# Patient Record
Sex: FEMALE | Race: WHITE | NOT HISPANIC OR LATINO | Employment: OTHER | URBAN - NONMETROPOLITAN AREA
[De-identification: names, ages, dates, MRNs, and addresses within clinical notes are randomized per-mention and may not be internally consistent; named-entity substitution may affect disease eponyms.]

---

## 2020-01-01 ENCOUNTER — HOSPITAL ENCOUNTER (INPATIENT)
Facility: HOSPITAL | Age: 80
LOS: 14 days | Discharge: HOME HEALTH | DRG: 871 | End: 2021-01-13
Attending: INTERNAL MEDICINE | Admitting: INTERNAL MEDICINE
Payer: MEDICARE

## 2020-01-01 VITALS
TEMPERATURE: 96 F | BODY MASS INDEX: 31.36 KG/M2 | DIASTOLIC BLOOD PRESSURE: 90 MMHG | WEIGHT: 195.13 LBS | OXYGEN SATURATION: 98 % | SYSTOLIC BLOOD PRESSURE: 132 MMHG | HEART RATE: 77 BPM | RESPIRATION RATE: 18 BRPM | HEIGHT: 66 IN

## 2020-01-01 DIAGNOSIS — E11.9 TYPE 2 DIABETES MELLITUS WITHOUT COMPLICATION, WITHOUT LONG-TERM CURRENT USE OF INSULIN: ICD-10-CM

## 2020-01-01 DIAGNOSIS — A41.9 SEPTIC SHOCK: Primary | ICD-10-CM

## 2020-01-01 DIAGNOSIS — R53.81 DEBILITY: ICD-10-CM

## 2020-01-01 DIAGNOSIS — R65.21 SEPTIC SHOCK: Primary | ICD-10-CM

## 2020-01-01 LAB
ALBUMIN SERPL BCP-MCNC: 2.8 G/DL (ref 3.5–5.2)
ALBUMIN SERPL BCP-MCNC: 2.9 G/DL (ref 3.5–5.2)
ALBUMIN SERPL BCP-MCNC: 3 G/DL (ref 3.5–5.2)
ALP SERPL-CCNC: 76 U/L (ref 55–135)
ALP SERPL-CCNC: 77 U/L (ref 55–135)
ALP SERPL-CCNC: 86 U/L (ref 55–135)
ALT SERPL W/O P-5'-P-CCNC: 12 U/L (ref 10–44)
ALT SERPL W/O P-5'-P-CCNC: 14 U/L (ref 10–44)
ALT SERPL W/O P-5'-P-CCNC: 15 U/L (ref 10–44)
ANION GAP SERPL CALC-SCNC: 5 MMOL/L (ref 8–16)
ANION GAP SERPL CALC-SCNC: 5 MMOL/L (ref 8–16)
ANION GAP SERPL CALC-SCNC: 7 MMOL/L (ref 8–16)
AST SERPL-CCNC: 30 U/L (ref 10–40)
AST SERPL-CCNC: 31 U/L (ref 10–40)
AST SERPL-CCNC: 36 U/L (ref 10–40)
BASOPHILS # BLD AUTO: 0.09 K/UL (ref 0–0.2)
BASOPHILS # BLD AUTO: 0.11 K/UL (ref 0–0.2)
BASOPHILS # BLD AUTO: 0.11 K/UL (ref 0–0.2)
BASOPHILS NFR BLD: 1.1 % (ref 0–1.9)
BASOPHILS NFR BLD: 1.1 % (ref 0–1.9)
BASOPHILS NFR BLD: 1.3 % (ref 0–1.9)
BILIRUB SERPL-MCNC: 0.2 MG/DL (ref 0.1–1)
BUN SERPL-MCNC: 15 MG/DL (ref 8–23)
BUN SERPL-MCNC: 18 MG/DL (ref 8–23)
BUN SERPL-MCNC: 18 MG/DL (ref 8–23)
CALCIUM SERPL-MCNC: 8.4 MG/DL (ref 8.7–10.5)
CALCIUM SERPL-MCNC: 8.4 MG/DL (ref 8.7–10.5)
CALCIUM SERPL-MCNC: 8.9 MG/DL (ref 8.7–10.5)
CHLORIDE SERPL-SCNC: 103 MMOL/L (ref 95–110)
CHLORIDE SERPL-SCNC: 104 MMOL/L (ref 95–110)
CHLORIDE SERPL-SCNC: 104 MMOL/L (ref 95–110)
CO2 SERPL-SCNC: 28 MMOL/L (ref 23–29)
CO2 SERPL-SCNC: 28 MMOL/L (ref 23–29)
CO2 SERPL-SCNC: 29 MMOL/L (ref 23–29)
CREAT SERPL-MCNC: 2.1 MG/DL (ref 0.5–1.4)
CREAT SERPL-MCNC: 2.2 MG/DL (ref 0.5–1.4)
CREAT SERPL-MCNC: 2.4 MG/DL (ref 0.5–1.4)
DIFFERENTIAL METHOD: ABNORMAL
EOSINOPHIL # BLD AUTO: 0.3 K/UL (ref 0–0.5)
EOSINOPHIL # BLD AUTO: 0.4 K/UL (ref 0–0.5)
EOSINOPHIL # BLD AUTO: 0.4 K/UL (ref 0–0.5)
EOSINOPHIL NFR BLD: 3.1 % (ref 0–8)
EOSINOPHIL NFR BLD: 4.6 % (ref 0–8)
EOSINOPHIL NFR BLD: 5.3 % (ref 0–8)
ERYTHROCYTE [DISTWIDTH] IN BLOOD BY AUTOMATED COUNT: 17 % (ref 11.5–14.5)
ERYTHROCYTE [DISTWIDTH] IN BLOOD BY AUTOMATED COUNT: 17.1 % (ref 11.5–14.5)
ERYTHROCYTE [DISTWIDTH] IN BLOOD BY AUTOMATED COUNT: 17.4 % (ref 11.5–14.5)
EST. GFR  (AFRICAN AMERICAN): 21.6 ML/MIN/1.73 M^2
EST. GFR  (AFRICAN AMERICAN): 24 ML/MIN/1.73 M^2
EST. GFR  (AFRICAN AMERICAN): 25.4 ML/MIN/1.73 M^2
EST. GFR  (NON AFRICAN AMERICAN): 18.8 ML/MIN/1.73 M^2
EST. GFR  (NON AFRICAN AMERICAN): 20.9 ML/MIN/1.73 M^2
EST. GFR  (NON AFRICAN AMERICAN): 22.1 ML/MIN/1.73 M^2
GLUCOSE SERPL-MCNC: 108 MG/DL (ref 70–110)
GLUCOSE SERPL-MCNC: 91 MG/DL (ref 70–110)
GLUCOSE SERPL-MCNC: 98 MG/DL (ref 70–110)
HCT VFR BLD AUTO: 35.2 % (ref 37–48.5)
HCT VFR BLD AUTO: 36.5 % (ref 37–48.5)
HCT VFR BLD AUTO: 37.6 % (ref 37–48.5)
HGB BLD-MCNC: 10.8 G/DL (ref 12–16)
HGB BLD-MCNC: 11.2 G/DL (ref 12–16)
HGB BLD-MCNC: 11.6 G/DL (ref 12–16)
IMM GRANULOCYTES # BLD AUTO: 0.06 K/UL (ref 0–0.04)
IMM GRANULOCYTES # BLD AUTO: 0.07 K/UL (ref 0–0.04)
IMM GRANULOCYTES # BLD AUTO: 0.07 K/UL (ref 0–0.04)
IMM GRANULOCYTES NFR BLD AUTO: 0.6 % (ref 0–0.5)
IMM GRANULOCYTES NFR BLD AUTO: 0.8 % (ref 0–0.5)
IMM GRANULOCYTES NFR BLD AUTO: 0.9 % (ref 0–0.5)
LYMPHOCYTES # BLD AUTO: 1.8 K/UL (ref 1–4.8)
LYMPHOCYTES # BLD AUTO: 2 K/UL (ref 1–4.8)
LYMPHOCYTES # BLD AUTO: 2.1 K/UL (ref 1–4.8)
LYMPHOCYTES NFR BLD: 18 % (ref 18–48)
LYMPHOCYTES NFR BLD: 24.8 % (ref 18–48)
LYMPHOCYTES NFR BLD: 25.7 % (ref 18–48)
MCH RBC QN AUTO: 28.2 PG (ref 27–31)
MCH RBC QN AUTO: 28.4 PG (ref 27–31)
MCH RBC QN AUTO: 28.5 PG (ref 27–31)
MCHC RBC AUTO-ENTMCNC: 30.7 G/DL (ref 32–36)
MCHC RBC AUTO-ENTMCNC: 30.7 G/DL (ref 32–36)
MCHC RBC AUTO-ENTMCNC: 30.9 G/DL (ref 32–36)
MCV RBC AUTO: 92 FL (ref 82–98)
MCV RBC AUTO: 92 FL (ref 82–98)
MCV RBC AUTO: 93 FL (ref 82–98)
MONOCYTES # BLD AUTO: 0.6 K/UL (ref 0.3–1)
MONOCYTES # BLD AUTO: 0.8 K/UL (ref 0.3–1)
MONOCYTES # BLD AUTO: 0.8 K/UL (ref 0.3–1)
MONOCYTES NFR BLD: 10.5 % (ref 4–15)
MONOCYTES NFR BLD: 6.2 % (ref 4–15)
MONOCYTES NFR BLD: 9.1 % (ref 4–15)
NEUTROPHILS # BLD AUTO: 4.5 K/UL (ref 1.8–7.7)
NEUTROPHILS # BLD AUTO: 5 K/UL (ref 1.8–7.7)
NEUTROPHILS # BLD AUTO: 7.2 K/UL (ref 1.8–7.7)
NEUTROPHILS NFR BLD: 56.5 % (ref 38–73)
NEUTROPHILS NFR BLD: 59.4 % (ref 38–73)
NEUTROPHILS NFR BLD: 71 % (ref 38–73)
NRBC BLD-RTO: 0 /100 WBC
PLATELET # BLD AUTO: 334 K/UL (ref 150–350)
PLATELET # BLD AUTO: 359 K/UL (ref 150–350)
PLATELET # BLD AUTO: 427 K/UL (ref 150–350)
PMV BLD AUTO: 9.4 FL (ref 9.2–12.9)
PMV BLD AUTO: 9.6 FL (ref 9.2–12.9)
PMV BLD AUTO: 9.9 FL (ref 9.2–12.9)
POCT GLUCOSE: 100 MG/DL (ref 70–110)
POCT GLUCOSE: 101 MG/DL (ref 70–110)
POCT GLUCOSE: 102 MG/DL (ref 70–110)
POCT GLUCOSE: 104 MG/DL (ref 70–110)
POCT GLUCOSE: 104 MG/DL (ref 70–110)
POCT GLUCOSE: 111 MG/DL (ref 70–110)
POCT GLUCOSE: 122 MG/DL (ref 70–110)
POCT GLUCOSE: 75 MG/DL (ref 70–110)
POCT GLUCOSE: 86 MG/DL (ref 70–110)
POCT GLUCOSE: 88 MG/DL (ref 70–110)
POCT GLUCOSE: 93 MG/DL (ref 70–110)
POTASSIUM SERPL-SCNC: 3.8 MMOL/L (ref 3.5–5.1)
POTASSIUM SERPL-SCNC: 4.1 MMOL/L (ref 3.5–5.1)
POTASSIUM SERPL-SCNC: 4.3 MMOL/L (ref 3.5–5.1)
PROT SERPL-MCNC: 5.8 G/DL (ref 6–8.4)
PROT SERPL-MCNC: 6 G/DL (ref 6–8.4)
PROT SERPL-MCNC: 6.2 G/DL (ref 6–8.4)
RBC # BLD AUTO: 3.79 M/UL (ref 4–5.4)
RBC # BLD AUTO: 3.97 M/UL (ref 4–5.4)
RBC # BLD AUTO: 4.08 M/UL (ref 4–5.4)
SODIUM SERPL-SCNC: 136 MMOL/L (ref 136–145)
SODIUM SERPL-SCNC: 138 MMOL/L (ref 136–145)
SODIUM SERPL-SCNC: 139 MMOL/L (ref 136–145)
WBC # BLD AUTO: 10.08 K/UL (ref 3.9–12.7)
WBC # BLD AUTO: 7.94 K/UL (ref 3.9–12.7)
WBC # BLD AUTO: 8.35 K/UL (ref 3.9–12.7)

## 2020-01-01 PROCEDURE — 97530 THERAPEUTIC ACTIVITIES: CPT

## 2020-01-01 PROCEDURE — 63600175 PHARM REV CODE 636 W HCPCS: Performed by: INTERNAL MEDICINE

## 2020-01-01 PROCEDURE — 25000003 PHARM REV CODE 250: Performed by: INTERNAL MEDICINE

## 2020-01-01 PROCEDURE — 85025 COMPLETE CBC W/AUTO DIFF WBC: CPT

## 2020-01-01 PROCEDURE — 99900031 HC PATIENT EDUCATION (STAT)

## 2020-01-01 PROCEDURE — 97116 GAIT TRAINING THERAPY: CPT

## 2020-01-01 PROCEDURE — 99900035 HC TECH TIME PER 15 MIN (STAT)

## 2020-01-01 PROCEDURE — 11000001 HC ACUTE MED/SURG PRIVATE ROOM

## 2020-01-01 PROCEDURE — 80053 COMPREHEN METABOLIC PANEL: CPT

## 2020-01-01 PROCEDURE — 97110 THERAPEUTIC EXERCISES: CPT

## 2020-01-01 PROCEDURE — S0030 INJECTION, METRONIDAZOLE: HCPCS | Performed by: INTERNAL MEDICINE

## 2020-01-01 PROCEDURE — 94760 N-INVAS EAR/PLS OXIMETRY 1: CPT

## 2020-01-01 PROCEDURE — S0030 INJECTION, METRONIDAZOLE: HCPCS | Performed by: EMERGENCY MEDICINE

## 2020-01-01 PROCEDURE — 94761 N-INVAS EAR/PLS OXIMETRY MLT: CPT

## 2020-01-01 PROCEDURE — 36415 COLL VENOUS BLD VENIPUNCTURE: CPT

## 2020-01-01 PROCEDURE — 63600175 PHARM REV CODE 636 W HCPCS: Performed by: EMERGENCY MEDICINE

## 2020-01-01 PROCEDURE — C9113 INJ PANTOPRAZOLE SODIUM, VIA: HCPCS | Performed by: INTERNAL MEDICINE

## 2020-01-01 PROCEDURE — 25000003 PHARM REV CODE 250: Performed by: EMERGENCY MEDICINE

## 2020-01-01 PROCEDURE — 97162 PT EVAL MOD COMPLEX 30 MIN: CPT

## 2020-01-01 PROCEDURE — 92523 SPEECH SOUND LANG COMPREHEN: CPT

## 2020-01-01 PROCEDURE — 97535 SELF CARE MNGMENT TRAINING: CPT

## 2020-01-01 PROCEDURE — 97166 OT EVAL MOD COMPLEX 45 MIN: CPT

## 2020-01-01 PROCEDURE — 92507 TX SP LANG VOICE COMM INDIV: CPT

## 2020-01-01 RX ORDER — TALC
6 POWDER (GRAM) TOPICAL NIGHTLY PRN
Status: DISCONTINUED | OUTPATIENT
Start: 2020-01-01 | End: 2021-01-01 | Stop reason: HOSPADM

## 2020-01-01 RX ORDER — ONDANSETRON 2 MG/ML
4 INJECTION INTRAMUSCULAR; INTRAVENOUS EVERY 8 HOURS PRN
Status: DISCONTINUED | OUTPATIENT
Start: 2020-01-01 | End: 2021-01-01

## 2020-01-01 RX ORDER — SUCRALFATE 1 G/1
1 TABLET ORAL 4 TIMES DAILY
Status: CANCELLED | OUTPATIENT
Start: 2020-01-01

## 2020-01-01 RX ORDER — METRONIDAZOLE 500 MG/100ML
500 INJECTION, SOLUTION INTRAVENOUS
Status: CANCELLED | OUTPATIENT
Start: 2020-01-01

## 2020-01-01 RX ORDER — INSULIN ASPART 100 [IU]/ML
0-5 INJECTION, SOLUTION INTRAVENOUS; SUBCUTANEOUS EVERY 6 HOURS PRN
Status: DISCONTINUED | OUTPATIENT
Start: 2020-01-01 | End: 2021-01-01

## 2020-01-01 RX ORDER — GLUCAGON 1 MG
1 KIT INJECTION
Status: DISCONTINUED | OUTPATIENT
Start: 2020-01-01 | End: 2021-01-01 | Stop reason: HOSPADM

## 2020-01-01 RX ORDER — PANTOPRAZOLE SODIUM 40 MG/10ML
40 INJECTION, POWDER, LYOPHILIZED, FOR SOLUTION INTRAVENOUS 2 TIMES DAILY
Status: DISCONTINUED | OUTPATIENT
Start: 2020-01-01 | End: 2021-01-01

## 2020-01-01 RX ORDER — GLUCAGON 1 MG
1 KIT INJECTION
Status: CANCELLED | OUTPATIENT
Start: 2020-01-01

## 2020-01-01 RX ORDER — INSULIN ASPART 100 [IU]/ML
0-5 INJECTION, SOLUTION INTRAVENOUS; SUBCUTANEOUS EVERY 6 HOURS PRN
Status: CANCELLED | OUTPATIENT
Start: 2020-01-01

## 2020-01-01 RX ORDER — SUCRALFATE 1 G/1
1 TABLET ORAL 4 TIMES DAILY
Status: DISCONTINUED | OUTPATIENT
Start: 2020-01-01 | End: 2021-01-01 | Stop reason: HOSPADM

## 2020-01-01 RX ORDER — MICONAZOLE NITRATE 2 %
POWDER (GRAM) TOPICAL 2 TIMES DAILY
Status: DISCONTINUED | OUTPATIENT
Start: 2020-01-01 | End: 2021-01-01 | Stop reason: HOSPADM

## 2020-01-01 RX ORDER — TALC
6 POWDER (GRAM) TOPICAL NIGHTLY PRN
Status: CANCELLED | OUTPATIENT
Start: 2020-01-01

## 2020-01-01 RX ORDER — GABAPENTIN 100 MG/1
100 CAPSULE ORAL 2 TIMES DAILY
Status: CANCELLED | OUTPATIENT
Start: 2020-01-01

## 2020-01-01 RX ORDER — HYDROCODONE BITARTRATE AND ACETAMINOPHEN 500; 5 MG/1; MG/1
TABLET ORAL
Status: CANCELLED | OUTPATIENT
Start: 2020-01-01

## 2020-01-01 RX ORDER — HYDROCODONE BITARTRATE AND ACETAMINOPHEN 500; 5 MG/1; MG/1
TABLET ORAL
Status: DISCONTINUED | OUTPATIENT
Start: 2020-01-01 | End: 2021-01-01

## 2020-01-01 RX ORDER — SODIUM CHLORIDE 0.9 % (FLUSH) 0.9 %
10 SYRINGE (ML) INJECTION
Status: CANCELLED | OUTPATIENT
Start: 2020-01-01

## 2020-01-01 RX ORDER — ONDANSETRON 2 MG/ML
4 INJECTION INTRAMUSCULAR; INTRAVENOUS EVERY 8 HOURS PRN
Status: CANCELLED | OUTPATIENT
Start: 2020-01-01

## 2020-01-01 RX ORDER — OXYCODONE AND ACETAMINOPHEN 10; 325 MG/1; MG/1
1 TABLET ORAL EVERY 4 HOURS PRN
Status: DISCONTINUED | OUTPATIENT
Start: 2020-01-01 | End: 2021-01-01

## 2020-01-01 RX ORDER — MONTELUKAST SODIUM 10 MG/1
10 TABLET ORAL NIGHTLY
Status: CANCELLED | OUTPATIENT
Start: 2020-01-01

## 2020-01-01 RX ORDER — METRONIDAZOLE 500 MG/100ML
500 INJECTION, SOLUTION INTRAVENOUS
Status: DISCONTINUED | OUTPATIENT
Start: 2020-01-01 | End: 2021-01-01

## 2020-01-01 RX ORDER — CARVEDILOL 3.12 MG/1
3.12 TABLET ORAL 2 TIMES DAILY
Status: DISCONTINUED | OUTPATIENT
Start: 2020-01-01 | End: 2021-01-01 | Stop reason: HOSPADM

## 2020-01-01 RX ORDER — OXYCODONE AND ACETAMINOPHEN 10; 325 MG/1; MG/1
1 TABLET ORAL EVERY 4 HOURS PRN
Status: CANCELLED | OUTPATIENT
Start: 2020-01-01

## 2020-01-01 RX ORDER — CARVEDILOL 3.12 MG/1
3.12 TABLET ORAL 2 TIMES DAILY
Status: CANCELLED | OUTPATIENT
Start: 2020-01-01

## 2020-01-01 RX ORDER — PANTOPRAZOLE SODIUM 40 MG/10ML
40 INJECTION, POWDER, LYOPHILIZED, FOR SOLUTION INTRAVENOUS 2 TIMES DAILY
Status: CANCELLED | OUTPATIENT
Start: 2020-01-01

## 2020-01-01 RX ORDER — MONTELUKAST SODIUM 10 MG/1
10 TABLET ORAL NIGHTLY
Status: DISCONTINUED | OUTPATIENT
Start: 2020-01-01 | End: 2021-01-01 | Stop reason: HOSPADM

## 2020-01-01 RX ORDER — SODIUM CHLORIDE 0.9 % (FLUSH) 0.9 %
10 SYRINGE (ML) INJECTION
Status: DISCONTINUED | OUTPATIENT
Start: 2020-01-01 | End: 2021-01-01

## 2020-01-01 RX ORDER — GABAPENTIN 100 MG/1
100 CAPSULE ORAL 2 TIMES DAILY
Status: DISCONTINUED | OUTPATIENT
Start: 2020-01-01 | End: 2021-01-01 | Stop reason: HOSPADM

## 2020-01-01 RX ADMIN — CARVEDILOL 3.12 MG: 3.12 TABLET, FILM COATED ORAL at 09:12

## 2020-01-01 RX ADMIN — VANCOMYCIN HYDROCHLORIDE 125 MG: KIT at 12:12

## 2020-01-01 RX ADMIN — SUCRALFATE 1 G: 1 TABLET ORAL at 12:12

## 2020-01-01 RX ADMIN — METRONIDAZOLE 500 MG: 500 INJECTION, SOLUTION INTRAVENOUS at 04:12

## 2020-01-01 RX ADMIN — OXYCODONE AND ACETAMINOPHEN 1 TABLET: 325; 10 TABLET ORAL at 02:12

## 2020-01-01 RX ADMIN — CARVEDILOL 3.12 MG: 3.12 TABLET, FILM COATED ORAL at 08:12

## 2020-01-01 RX ADMIN — PANTOPRAZOLE SODIUM 40 MG: 40 INJECTION, POWDER, FOR SOLUTION INTRAVENOUS at 08:12

## 2020-01-01 RX ADMIN — ANTI-FUNGAL POWDER MICONAZOLE NITRATE TALC FREE: 1.42 POWDER TOPICAL at 09:12

## 2020-01-01 RX ADMIN — METRONIDAZOLE 500 MG: 500 INJECTION, SOLUTION INTRAVENOUS at 12:12

## 2020-01-01 RX ADMIN — CARVEDILOL 3.12 MG: 3.12 TABLET, FILM COATED ORAL at 10:12

## 2020-01-01 RX ADMIN — OXYCODONE AND ACETAMINOPHEN 1 TABLET: 325; 10 TABLET ORAL at 01:12

## 2020-01-01 RX ADMIN — GABAPENTIN 100 MG: 100 CAPSULE ORAL at 08:12

## 2020-01-01 RX ADMIN — OXYCODONE AND ACETAMINOPHEN 1 TABLET: 325; 10 TABLET ORAL at 10:12

## 2020-01-01 RX ADMIN — ONDANSETRON 4 MG: 2 INJECTION INTRAMUSCULAR; INTRAVENOUS at 02:12

## 2020-01-01 RX ADMIN — OXYCODONE AND ACETAMINOPHEN 1 TABLET: 325; 10 TABLET ORAL at 04:12

## 2020-01-01 RX ADMIN — SUCRALFATE 1 G: 1 TABLET ORAL at 08:12

## 2020-01-01 RX ADMIN — METRONIDAZOLE 500 MG: 500 INJECTION, SOLUTION INTRAVENOUS at 05:12

## 2020-01-01 RX ADMIN — OXYCODONE AND ACETAMINOPHEN 1 TABLET: 325; 10 TABLET ORAL at 12:12

## 2020-01-01 RX ADMIN — PANTOPRAZOLE SODIUM 40 MG: 40 INJECTION, POWDER, FOR SOLUTION INTRAVENOUS at 09:12

## 2020-01-01 RX ADMIN — METRONIDAZOLE 500 MG: 500 INJECTION, SOLUTION INTRAVENOUS at 06:12

## 2020-01-01 RX ADMIN — VANCOMYCIN HYDROCHLORIDE 125 MG: KIT at 06:12

## 2020-01-01 RX ADMIN — GABAPENTIN 100 MG: 100 CAPSULE ORAL at 10:12

## 2020-01-01 RX ADMIN — MONTELUKAST 10 MG: 10 TABLET, FILM COATED ORAL at 08:12

## 2020-01-01 RX ADMIN — MONTELUKAST 10 MG: 10 TABLET, FILM COATED ORAL at 09:12

## 2020-01-01 RX ADMIN — METRONIDAZOLE 500 MG: 500 INJECTION, SOLUTION INTRAVENOUS at 02:12

## 2020-01-01 RX ADMIN — POTASSIUM CHLORIDE AND SODIUM CHLORIDE: 900; 150 INJECTION, SOLUTION INTRAVENOUS at 12:12

## 2020-01-01 RX ADMIN — METRONIDAZOLE 500 MG: 500 INJECTION, SOLUTION INTRAVENOUS at 07:12

## 2020-01-01 RX ADMIN — SUCRALFATE 1 G: 1 TABLET ORAL at 04:12

## 2020-01-01 RX ADMIN — SUCRALFATE 1 G: 1 TABLET ORAL at 05:12

## 2020-01-01 RX ADMIN — VANCOMYCIN HYDROCHLORIDE 125 MG: KIT at 11:12

## 2020-01-01 RX ADMIN — PANTOPRAZOLE SODIUM 40 MG: 40 INJECTION, POWDER, FOR SOLUTION INTRAVENOUS at 10:12

## 2020-01-01 RX ADMIN — OXYCODONE AND ACETAMINOPHEN 1 TABLET: 325; 10 TABLET ORAL at 08:12

## 2020-01-01 RX ADMIN — ONDANSETRON 4 MG: 2 INJECTION INTRAMUSCULAR; INTRAVENOUS at 06:12

## 2020-01-01 RX ADMIN — SUCRALFATE 1 G: 1 TABLET ORAL at 09:12

## 2020-01-01 RX ADMIN — OXYCODONE HYDROCHLORIDE AND ACETAMINOPHEN 1 TABLET: 10; 325 TABLET ORAL at 08:12

## 2020-01-01 RX ADMIN — SUCRALFATE 1 G: 1 TABLET ORAL at 10:12

## 2020-01-01 RX ADMIN — ANTI-FUNGAL POWDER MICONAZOLE NITRATE TALC FREE: 1.42 POWDER TOPICAL at 08:12

## 2020-01-01 RX ADMIN — METRONIDAZOLE 500 MG: 500 INJECTION, SOLUTION INTRAVENOUS at 09:12

## 2020-01-01 RX ADMIN — SUCRALFATE 1 G: 1 TABLET ORAL at 06:12

## 2020-01-01 RX ADMIN — GABAPENTIN 100 MG: 100 CAPSULE ORAL at 09:12

## 2020-01-01 RX ADMIN — VANCOMYCIN HYDROCHLORIDE 125 MG: KIT at 05:12

## 2020-01-01 RX ADMIN — METRONIDAZOLE 500 MG: 500 INJECTION, SOLUTION INTRAVENOUS at 10:12

## 2020-01-01 RX ADMIN — ONDANSETRON 4 MG: 2 INJECTION INTRAMUSCULAR; INTRAVENOUS at 12:12

## 2020-04-16 ENCOUNTER — HISTORICAL (OUTPATIENT)
Dept: ADMINISTRATIVE | Facility: HOSPITAL | Age: 80
End: 2020-04-16

## 2020-04-16 LAB
ALBUMIN SERPL BCP-MCNC: 2.6 G/DL (ref 3.5–5)
ALBUMIN/GLOB SERPL ELPH: 0.7 {RATIO} (ref 1.5–2.2)
ALP SERPL-CCNC: 85 U/L (ref 45–117)
ALT SERPL W P-5'-P-CCNC: 8 U/L (ref 13–56)
AMYLASE SERPL-CCNC: 49 U/L (ref 25–115)
ANION GAP SERPL CALC-SCNC: 11.6 MEQ/L (ref 10–20)
APTT PPP: 23.4 SEC (ref 23–30.4)
AST SERPL-CCNC: 8 U/L (ref 15–37)
BILIRUB SERPL-MCNC: 0.17 MG/DL (ref 0.2–1)
BUN SERPL-MCNC: 23 MG/DL (ref 7–18)
CALCIUM SERPL-MCNC: 8.2 MG/DL (ref 8.5–10.1)
CHLORIDE SERPL-SCNC: 107 MMOL/L (ref 98–107)
CO2 SERPL-SCNC: 24 MMOL/L (ref 22–32)
CREAT SERPL-MCNC: 2.22 MG/DL (ref 0.55–1.02)
EGFR: 23 ML/MIN/1.73M
GLOBULIN: 3.8 G/DL (ref 2.3–3.5)
GLUCOSE SERPL-MCNC: 126 MG/DL (ref 70–99)
INR PPP: 0.9 (ref 0.9–1.2)
LIPASE SERPL-CCNC: 80 U/L (ref 73–393)
OSMOC: 283 MOSM/KG (ref 275–295)
POTASSIUM SERPL-SCNC: 3.6 MMOL/L (ref 3.5–5.1)
PROT SERPL-MCNC: 6.4 G/DL (ref 6.4–8.2)
PROTHROMBIN TIME: 9.6 SEC (ref 9.8–12.4)
SODIUM BLD-SCNC: 139 MMOL/L (ref 136–145)

## 2020-04-23 LAB
ABO + RH BLD: NORMAL
ALBUMIN SERPL BCP-MCNC: 3 G/DL (ref 3.5–5)
ALBUMIN/GLOB SERPL ELPH: 0.8 {RATIO} (ref 1.5–2.2)
ALP SERPL-CCNC: 93 U/L (ref 45–117)
ALT SERPL W P-5'-P-CCNC: 11 U/L (ref 13–56)
ANION GAP SERPL CALC-SCNC: 13.2 MEQ/L (ref 10–20)
APTT PPP: 21.9 SEC (ref 23–30.4)
AST SERPL-CCNC: 8 U/L (ref 15–37)
BASOPHILS NFR BLD: 0.1 10 (ref 0–0.1)
BASOPHILS NFR BLD: 1.2 % (ref 0–1.5)
BILIRUB SERPL-MCNC: 0.17 MG/DL (ref 0.2–1)
BLD PROD TYP BPU: NORMAL
BUN SERPL-MCNC: 36 MG/DL (ref 7–18)
CALCIUM SERPL-MCNC: 8.3 MG/DL (ref 8.5–10.1)
CHLORIDE SERPL-SCNC: 111 MMOL/L (ref 98–107)
CO2 SERPL-SCNC: 21 MMOL/L (ref 22–32)
CREAT SERPL-MCNC: 2.23 MG/DL (ref 0.55–1.02)
EGFR: 23 ML/MIN/1.73M
EOSINOPHIL NFR BLD: 0.7 10 (ref 0–0.7)
EOSINOPHIL NFR BLD: 8.6 % (ref 0–7)
ERYTHROCYTE [DISTWIDTH] IN BLOOD BY AUTOMATED COUNT: 16.2 % (ref 11.5–14.5)
GLOBULIN: 4 G/DL (ref 2.3–3.5)
GLUCOSE SERPL-MCNC: 101 MG/DL (ref 70–99)
GRAN #: 4.69 10 (ref 2–7.5)
GRAN%: 0.5 %
GRAN%: 62.3 % (ref 50–80)
HCT VFR BLD AUTO: 28.1 % (ref 37.7–47.9)
HGB BLD-MCNC: 8.7 G/DL (ref 12.2–16.2)
HISTORY CHECK: NORMAL
IMMATURE GRANULOCYTES #: 0.04 10
INDIRECT COOMBS GEL: NEGATIVE
INR PPP: 0.9 (ref 0.9–1.2)
ISSUED DATE & TIME: NORMAL
LYMPH #: 1.4 10 (ref 1–3.5)
LYMPH%: 18.9 % (ref 12–50)
MAGNESIUM SERPL-MCNC: 2.02 MG/DL (ref 1.8–2.4)
MCH RBC QN AUTO: 28.2 PG (ref 27–31)
MCHC RBC AUTO-ENTMCNC: 31 G% (ref 32–35)
MCV RBC AUTO: 90.9 FL (ref 80–97)
MONO #: 0.6 10 (ref 0–0.8)
MONO%: 8.5 % (ref 0–12)
OSMOC: 290 MOSM/KG (ref 275–295)
PMV BLD AUTO: 460 10 (ref 142–424)
PMV BLD AUTO: 9.5 FL (ref 7.4–10.4)
POTASSIUM SERPL-SCNC: 4.2 MMOL/L (ref 3.5–5.1)
PRODUCT ID: NORMAL
PROT SERPL-MCNC: 7 G/DL (ref 6.4–8.2)
PROTHROMBIN TIME: 9.4 SEC (ref 9.8–12.4)
RBC # BLD AUTO: 3.09 M/UL (ref 4.04–5.48)
SARS-COV-2 RNA RESP QL NAA+PROBE: NOT DETECTED
SARSCOV2 INTERNAL CONTROL: NORMAL
SODIUM BLD-SCNC: 141 MMOL/L (ref 136–145)
SPECIMEN EXP DATE: NORMAL
STATUS INFORMATION: NORMAL
UNIT NUMBER: NORMAL
UNIT TYPE: NORMAL
VOLUME: 350
WBC # BLD AUTO: 7.5 10 (ref 4–10.2)
XM: NORMAL

## 2020-04-24 LAB
ANION GAP SERPL CALC-SCNC: 11.8 MEQ/L (ref 10–20)
BASOPHILS NFR BLD: 0.1 10 (ref 0–0.1)
BASOPHILS NFR BLD: 1.3 % (ref 0–1.5)
BUN SERPL-MCNC: 31 MG/DL (ref 7–18)
CALCIUM SERPL-MCNC: 8.2 MG/DL (ref 8.5–10.1)
CHLORIDE SERPL-SCNC: 108 MMOL/L (ref 98–107)
CO2 SERPL-SCNC: 23 MMOL/L (ref 22–32)
CREAT SERPL-MCNC: 1.98 MG/DL (ref 0.55–1.02)
EGFR: 26 ML/MIN/1.73M
EOSINOPHIL NFR BLD: 0.6 10 (ref 0–0.7)
EOSINOPHIL NFR BLD: 10.1 % (ref 0–7)
ERYTHROCYTE [DISTWIDTH] IN BLOOD BY AUTOMATED COUNT: 15.8 % (ref 11.5–14.5)
GLUCOSE SERPL-MCNC: 100 MG/DL (ref 70–99)
GRAN #: 3.11 10 (ref 2–7.5)
GRAN%: 0.3 %
GRAN%: 51 % (ref 50–80)
HCT VFR BLD AUTO: 30.7 % (ref 37.7–47.9)
HGB BLD-MCNC: 9.6 G/DL (ref 12.2–16.2)
IMMATURE GRANULOCYTES #: 0.02 10
LYMPH #: 1.6 10 (ref 1–3.5)
LYMPH%: 25.5 % (ref 12–50)
MAGNESIUM SERPL-MCNC: 1.96 MG/DL (ref 1.8–2.4)
MCH RBC QN AUTO: 28.4 PG (ref 27–31)
MCHC RBC AUTO-ENTMCNC: 31.3 G% (ref 32–35)
MCV RBC AUTO: 90.8 FL (ref 80–97)
MONO #: 0.7 10 (ref 0–0.8)
MONO%: 11.8 % (ref 0–12)
OSMOC: 284 MOSM/KG (ref 275–295)
PMV BLD AUTO: 425 10 (ref 142–424)
PMV BLD AUTO: 9.6 FL (ref 7.4–10.4)
POTASSIUM SERPL-SCNC: 3.8 MMOL/L (ref 3.5–5.1)
RBC # BLD AUTO: 3.38 M/UL (ref 4.04–5.48)
SODIUM BLD-SCNC: 139 MMOL/L (ref 136–145)
WBC # BLD AUTO: 6.1 10 (ref 4–10.2)

## 2020-04-25 LAB
ALBUMIN SERPL BCP-MCNC: 2.8 G/DL (ref 3.5–5)
ALBUMIN/GLOB SERPL ELPH: 0.8 {RATIO} (ref 1.5–2.2)
ALP SERPL-CCNC: 88 U/L (ref 45–117)
ALT SERPL W P-5'-P-CCNC: 10 U/L (ref 13–56)
ANION GAP SERPL CALC-SCNC: 10.9 MEQ/L (ref 10–20)
APPEARANCE, UA: CLEAR
AST SERPL-CCNC: 9 U/L (ref 15–37)
BACTERIA SPEC CULT: NEGATIVE /HPF
BASOPHILS NFR BLD: 0.1 10 (ref 0–0.1)
BASOPHILS NFR BLD: 1.3 % (ref 0–1.5)
BILIRUB SERPL-MCNC: 0.18 MG/DL (ref 0.2–1)
BILIRUB UR QL STRIP: NEGATIVE MG/DL
BUDDING YEAST: NORMAL /HPF
BUN SERPL-MCNC: 22 MG/DL (ref 7–18)
CALCIUM SERPL-MCNC: 8.1 MG/DL (ref 8.5–10.1)
CASTS, URINE MICROSCOPIC: NEGATIVE /LPF
CHLORIDE SERPL-SCNC: 110 MMOL/L (ref 98–107)
CO2 SERPL-SCNC: 22 MMOL/L (ref 22–32)
COLOR UR: YELLOW
CREAT SERPL-MCNC: 1.77 MG/DL (ref 0.55–1.02)
EGFR: 30 ML/MIN/1.73M
EOSINOPHIL NFR BLD: 0.6 10 (ref 0–0.7)
EOSINOPHIL NFR BLD: 8.3 % (ref 0–7)
EPITHELIAL, URINE MICROSCOPIC: NEGATIVE /HPF
ERYTHROCYTE [DISTWIDTH] IN BLOOD BY AUTOMATED COUNT: 15.3 % (ref 11.5–14.5)
GLOBULIN: 3.7 G/DL (ref 2.3–3.5)
GLUCOSE (UA): NEGATIVE MG/DL
GLUCOSE SERPL-MCNC: 106 MG/DL (ref 70–99)
GRAN #: 4.59 10 (ref 2–7.5)
GRAN%: 0.3 %
GRAN%: 61.2 % (ref 50–80)
HCT VFR BLD AUTO: 31.6 % (ref 37.7–47.9)
HGB BLD-MCNC: 9.9 G/DL (ref 12.2–16.2)
HGB UR QL STRIP: NEGATIVE ERY/UL
IMMATURE GRANULOCYTES #: 0.02 10
KETONES UR QL STRIP: NEGATIVE MG/DL
LEUKOCYTE ESTERASE UR QL STRIP: NEGATIVE LEU/UL
LYMPH #: 1.5 10 (ref 1–3.5)
LYMPH%: 19.6 % (ref 12–50)
MAGNESIUM SERPL-MCNC: 1.8 MG/DL (ref 1.8–2.4)
MCH RBC QN AUTO: 28.4 PG (ref 27–31)
MCHC RBC AUTO-ENTMCNC: 31.3 G% (ref 32–35)
MCV RBC AUTO: 90.5 FL (ref 80–97)
MONO #: 0.7 10 (ref 0–0.8)
MONO%: 9.3 % (ref 0–12)
NITRITE UR QL STRIP: NEGATIVE MG/DL
OSMOC: 281 MOSM/KG (ref 275–295)
PH UR STRIP: 5 [PH] (ref 5–7.5)
PMV BLD AUTO: 427 10 (ref 142–424)
PMV BLD AUTO: 9.6 FL (ref 7.4–10.4)
POTASSIUM SERPL-SCNC: 3.9 MMOL/L (ref 3.5–5.1)
PROT SERPL-MCNC: 6.5 G/DL (ref 6.4–8.2)
PROT UR QL STRIP: NEGATIVE MG/DL
RBC # BLD AUTO: 3.49 M/UL (ref 4.04–5.48)
RBC #/AREA URNS HPF: NEGATIVE /HPF
SODIUM BLD-SCNC: 139 MMOL/L (ref 136–145)
SP GR UR STRIP: 1.01 (ref 1–1.03)
SPERM, URINE MICROSCOPIC: NORMAL /HPF
TYPE OF SPECIMEN  (UA): NORMAL
UNCLASSIFIED CRYSTALS, UA: NORMAL /HPF
UROBILINOGEN UR STRIP-ACNC: NORMAL EU/L
WBC # BLD AUTO: 7.5 10 (ref 4–10.2)
WBC #/AREA URNS HPF: NEGATIVE /HPF

## 2020-04-26 LAB
ALBUMIN SERPL BCP-MCNC: 2.5 G/DL (ref 3.5–5)
ALBUMIN/GLOB SERPL ELPH: 0.7 {RATIO} (ref 1.5–2.2)
ALP SERPL-CCNC: 85 U/L (ref 45–117)
ALT SERPL W P-5'-P-CCNC: 9 U/L (ref 13–56)
ANION GAP SERPL CALC-SCNC: 12.9 MEQ/L (ref 10–20)
AST SERPL-CCNC: 9 U/L (ref 15–37)
BASOPHILS NFR BLD: 0.1 10 (ref 0–0.1)
BASOPHILS NFR BLD: 1.1 % (ref 0–1.5)
BILIRUB SERPL-MCNC: 0.26 MG/DL (ref 0.2–1)
BUN SERPL-MCNC: 19 MG/DL (ref 7–18)
CALCIUM SERPL-MCNC: 8 MG/DL (ref 8.5–10.1)
CHLORIDE SERPL-SCNC: 110 MMOL/L (ref 98–107)
CO2 SERPL-SCNC: 22 MMOL/L (ref 22–32)
CREAT SERPL-MCNC: 1.76 MG/DL (ref 0.55–1.02)
EGFR: 30 ML/MIN/1.73M
EOSINOPHIL NFR BLD: 0.5 10 (ref 0–0.7)
EOSINOPHIL NFR BLD: 6.3 % (ref 0–7)
ERYTHROCYTE [DISTWIDTH] IN BLOOD BY AUTOMATED COUNT: 15 % (ref 11.5–14.5)
GLOBULIN: 3.7 G/DL (ref 2.3–3.5)
GLUCOSE SERPL-MCNC: 110 MG/DL (ref 70–99)
GRAN #: 5.43 10 (ref 2–7.5)
GRAN%: 0.2 %
GRAN%: 64.8 % (ref 50–80)
HCT VFR BLD AUTO: 29 % (ref 37.7–47.9)
HGB BLD-MCNC: 9.3 G/DL (ref 12.2–16.2)
IMMATURE GRANULOCYTES #: 0.02 10
LYMPH #: 1.5 10 (ref 1–3.5)
LYMPH%: 17.6 % (ref 12–50)
MAGNESIUM SERPL-MCNC: 1.79 MG/DL (ref 1.8–2.4)
MCH RBC QN AUTO: 28.7 PG (ref 27–31)
MCHC RBC AUTO-ENTMCNC: 32.1 G% (ref 32–35)
MCV RBC AUTO: 89.5 FL (ref 80–97)
MONO #: 0.8 10 (ref 0–0.8)
MONO%: 10 % (ref 0–12)
OSMOC: 284 MOSM/KG (ref 275–295)
PMV BLD AUTO: 387 10 (ref 142–424)
PMV BLD AUTO: 9.5 FL (ref 7.4–10.4)
POTASSIUM SERPL-SCNC: 3.9 MMOL/L (ref 3.5–5.1)
PROT SERPL-MCNC: 6.2 G/DL (ref 6.4–8.2)
RBC # BLD AUTO: 3.24 M/UL (ref 4.04–5.48)
SODIUM BLD-SCNC: 141 MMOL/L (ref 136–145)
WBC # BLD AUTO: 8.4 10 (ref 4–10.2)

## 2020-04-27 LAB
ALBUMIN SERPL BCP-MCNC: 2.4 G/DL (ref 3.5–5)
ALBUMIN/GLOB SERPL ELPH: 0.6 {RATIO} (ref 1.5–2.2)
ALP SERPL-CCNC: 80 U/L (ref 45–117)
ALT SERPL W P-5'-P-CCNC: 9 U/L (ref 13–56)
ANION GAP SERPL CALC-SCNC: 11 MEQ/L (ref 10–20)
AST SERPL-CCNC: 14 U/L (ref 15–37)
BASOPHILS NFR BLD: 0.1 10 (ref 0–0.1)
BASOPHILS NFR BLD: 1.3 % (ref 0–1.5)
BILIRUB SERPL-MCNC: 0.28 MG/DL (ref 0.2–1)
BUN SERPL-MCNC: 12 MG/DL (ref 7–18)
CALCIUM SERPL-MCNC: 8 MG/DL (ref 8.5–10.1)
CAMPY AG: NEGATIVE
CHLORIDE SERPL-SCNC: 112 MMOL/L (ref 98–107)
CO2 SERPL-SCNC: 24 MMOL/L (ref 22–32)
CREAT SERPL-MCNC: 1.7 MG/DL (ref 0.55–1.02)
EGFR: 31 ML/MIN/1.73M
EOSINOPHIL NFR BLD: 0.6 10 (ref 0–0.7)
EOSINOPHIL NFR BLD: 8 % (ref 0–7)
ERYTHROCYTE [DISTWIDTH] IN BLOOD BY AUTOMATED COUNT: 15 % (ref 11.5–14.5)
GLOBULIN: 3.7 G/DL (ref 2.3–3.5)
GLUCOSE SERPL-MCNC: 95 MG/DL (ref 70–99)
GRAN #: 3.85 10 (ref 2–7.5)
GRAN%: 0.3 %
GRAN%: 55.6 % (ref 50–80)
HCT VFR BLD AUTO: 28 % (ref 37.7–47.9)
HGB BLD-MCNC: 8.8 G/DL (ref 12.2–16.2)
IMMATURE GRANULOCYTES #: 0.02 10
INTERNAL NEG CONTROL: NEGATIVE
INTERNAL NEG CONTROL: NEGATIVE
INTERNAL POS CONTROL: POSITIVE
INTERNAL POS CONTROL: POSITIVE
LYMPH #: 1.5 10 (ref 1–3.5)
LYMPH%: 21.9 % (ref 12–50)
MAGNESIUM SERPL-MCNC: 1.68 MG/DL (ref 1.8–2.4)
MCH RBC QN AUTO: 28.4 PG (ref 27–31)
MCHC RBC AUTO-ENTMCNC: 31.4 G% (ref 32–35)
MCV RBC AUTO: 90.3 FL (ref 80–97)
MONO #: 0.9 10 (ref 0–0.8)
MONO%: 12.9 % (ref 0–12)
OSMOC: 285 MOSM/KG (ref 275–295)
PMV BLD AUTO: 382 10 (ref 142–424)
PMV BLD AUTO: 9.7 FL (ref 7.4–10.4)
POTASSIUM SERPL-SCNC: 4 MMOL/L (ref 3.5–5.1)
PROT SERPL-MCNC: 6.1 G/DL (ref 6.4–8.2)
RBC # BLD AUTO: 3.1 M/UL (ref 4.04–5.48)
SHIGA TOXIN 1 E.COLI: NEGATIVE
SHIGA TOXIN 2 E.COLI: NEGATIVE
SODIUM BLD-SCNC: 143 MMOL/L (ref 136–145)
WBC # BLD AUTO: 6.9 10 (ref 4–10.2)

## 2020-04-28 LAB
ABO + RH BLD: NORMAL
ALBUMIN SERPL BCP-MCNC: 2.4 G/DL (ref 3.5–5)
ALBUMIN/GLOB SERPL ELPH: 0.6 {RATIO} (ref 1.5–2.2)
ALP SERPL-CCNC: 78 U/L (ref 45–117)
ALT SERPL W P-5'-P-CCNC: 9 U/L (ref 13–56)
ANION GAP SERPL CALC-SCNC: 12.7 MEQ/L (ref 10–20)
AST SERPL-CCNC: 16 U/L (ref 15–37)
BASOPHILS NFR BLD: 0.1 10 (ref 0–0.1)
BASOPHILS NFR BLD: 1 % (ref 0–1.5)
BILIRUB SERPL-MCNC: 0.23 MG/DL (ref 0.2–1)
BLD PROD TYP BPU: NORMAL
BUN SERPL-MCNC: 10 MG/DL (ref 7–18)
CALCIUM SERPL-MCNC: 8.4 MG/DL (ref 8.5–10.1)
CHLORIDE SERPL-SCNC: 110 MMOL/L (ref 98–107)
CO2 SERPL-SCNC: 21 MMOL/L (ref 22–32)
CREAT SERPL-MCNC: 1.75 MG/DL (ref 0.55–1.02)
EGFR: 30 ML/MIN/1.73M
EOSINOPHIL NFR BLD: 0.6 10 (ref 0–0.7)
EOSINOPHIL NFR BLD: 8.2 % (ref 0–7)
ERYTHROCYTE [DISTWIDTH] IN BLOOD BY AUTOMATED COUNT: 14.8 % (ref 11.5–14.5)
GLOBULIN: 3.7 G/DL (ref 2.3–3.5)
GLUCOSE SERPL-MCNC: 104 MG/DL (ref 70–99)
GRAN #: 3.97 10 (ref 2–7.5)
GRAN%: 0.3 %
GRAN%: 59.7 % (ref 50–80)
HCT VFR BLD AUTO: 27.4 % (ref 37.7–47.9)
HCT VFR BLD AUTO: 30.8 % (ref 37.7–47.9)
HGB BLD-MCNC: 10 G/DL (ref 12.2–16.2)
HGB BLD-MCNC: 8.6 G/DL (ref 12.2–16.2)
HISTORY CHECK: NORMAL
IMMATURE GRANULOCYTES #: 0.02 10
INDIRECT COOMBS GEL: NEGATIVE
ISSUED DATE & TIME: NORMAL
LYMPH #: 1.4 10 (ref 1–3.5)
LYMPH%: 20.2 % (ref 12–50)
MAGNESIUM SERPL-MCNC: 2.19 MG/DL (ref 1.8–2.4)
MCH RBC QN AUTO: 28.3 PG (ref 27–31)
MCHC RBC AUTO-ENTMCNC: 31.4 G% (ref 32–35)
MCV RBC AUTO: 90.1 FL (ref 80–97)
MONO #: 0.7 10 (ref 0–0.8)
MONO%: 10.6 % (ref 0–12)
OSMOC: 279 MOSM/KG (ref 275–295)
PMV BLD AUTO: 10.1 FL (ref 7.4–10.4)
PMV BLD AUTO: 403 10 (ref 142–424)
POTASSIUM SERPL-SCNC: 3.7 MMOL/L (ref 3.5–5.1)
PRODUCT ID: NORMAL
PROT SERPL-MCNC: 6.1 G/DL (ref 6.4–8.2)
RBC # BLD AUTO: 3.04 M/UL (ref 4.04–5.48)
SODIUM BLD-SCNC: 140 MMOL/L (ref 136–145)
SPECIMEN EXP DATE: NORMAL
STATUS INFORMATION: NORMAL
UNIT NUMBER: NORMAL
UNIT TYPE: NORMAL
VOLUME: 350
WBC # BLD AUTO: 6.7 10 (ref 4–10.2)
XM: NORMAL

## 2020-04-29 LAB
ALBUMIN SERPL BCP-MCNC: 2.5 G/DL (ref 3.5–5)
ALBUMIN/GLOB SERPL ELPH: 0.6 {RATIO} (ref 1.5–2.2)
ALP SERPL-CCNC: 78 U/L (ref 45–117)
ALT SERPL W P-5'-P-CCNC: 11 U/L (ref 13–56)
ANION GAP SERPL CALC-SCNC: 10.5 MEQ/L (ref 10–20)
AST SERPL-CCNC: 18 U/L (ref 15–37)
BASOPHILS NFR BLD: 0.1 10 (ref 0–0.1)
BASOPHILS NFR BLD: 0.8 % (ref 0–1.5)
BILIRUB SERPL-MCNC: 0.4 MG/DL (ref 0.2–1)
BUN SERPL-MCNC: 10 MG/DL (ref 7–18)
CALCIUM SERPL-MCNC: 8.6 MG/DL (ref 8.5–10.1)
CHLORIDE SERPL-SCNC: 106 MMOL/L (ref 98–107)
CO2 SERPL-SCNC: 27 MMOL/L (ref 22–32)
CREAT SERPL-MCNC: 1.92 MG/DL (ref 0.55–1.02)
EGFR: 27 ML/MIN/1.73M
EOSINOPHIL NFR BLD: 0.7 10 (ref 0–0.7)
EOSINOPHIL NFR BLD: 8.3 % (ref 0–7)
ERYTHROCYTE [DISTWIDTH] IN BLOOD BY AUTOMATED COUNT: 15.9 % (ref 11.5–14.5)
GLOBULIN: 3.9 G/DL (ref 2.3–3.5)
GLUCOSE SERPL-MCNC: 104 MG/DL (ref 70–99)
GRAN #: 4.87 10 (ref 2–7.5)
GRAN%: 0.3 %
GRAN%: 61.8 % (ref 50–80)
HCT VFR BLD AUTO: 31.5 % (ref 37.7–47.9)
HEMOCUE, POC GLUCOSE: 110 MG/DL (ref 74–106)
HEMOCUE, POC GLUCOSE: 125 MG/DL (ref 74–106)
HEMOCUE, POC GLUCOSE: 131 MG/DL (ref 74–106)
HEMOCUE, POC GLUCOSE: 70 MG/DL (ref 74–106)
HEMOCUE, POC GLUCOSE: 73 MG/DL (ref 74–106)
HEMOCUE, POC GLUCOSE: 74 MG/DL (ref 74–106)
HGB BLD-MCNC: 10.1 G/DL (ref 12.2–16.2)
IMMATURE GRANULOCYTES #: 0.02 10
LYMPH #: 1.4 10 (ref 1–3.5)
LYMPH%: 17.3 % (ref 12–50)
MAGNESIUM SERPL-MCNC: 2.06 MG/DL (ref 1.8–2.4)
MCH RBC QN AUTO: 28.2 PG (ref 27–31)
MCHC RBC AUTO-ENTMCNC: 32.1 G% (ref 32–35)
MCV RBC AUTO: 88 FL (ref 80–97)
MONO #: 0.9 10 (ref 0–0.8)
MONO%: 11.5 % (ref 0–12)
OSMOC: 279 MOSM/KG (ref 275–295)
PMV BLD AUTO: 380 10 (ref 142–424)
PMV BLD AUTO: 9.4 FL (ref 7.4–10.4)
POTASSIUM SERPL-SCNC: 3.5 MMOL/L (ref 3.5–5.1)
PROT SERPL-MCNC: 6.4 G/DL (ref 6.4–8.2)
RBC # BLD AUTO: 3.58 M/UL (ref 4.04–5.48)
SODIUM BLD-SCNC: 140 MMOL/L (ref 136–145)
WBC # BLD AUTO: 7.9 10 (ref 4–10.2)

## 2020-05-06 LAB
ANION GAP SERPL CALC-SCNC: 15.7 MEQ/L (ref 10–20)
BASOPHILS NFR BLD: 0.1 10 (ref 0–0.1)
BASOPHILS NFR BLD: 0.8 % (ref 0–1.5)
BUN SERPL-MCNC: 37 MG/DL (ref 7–18)
CALCIUM SERPL-MCNC: 8.8 MG/DL (ref 8.5–10.1)
CHLORIDE SERPL-SCNC: 108 MMOL/L (ref 98–107)
CO2 SERPL-SCNC: 21 MMOL/L (ref 22–32)
CREAT SERPL-MCNC: 2.48 MG/DL (ref 0.55–1.02)
EGFR: 20 ML/MIN/1.73M
EOSINOPHIL NFR BLD: 0.6 10 (ref 0–0.7)
EOSINOPHIL NFR BLD: 6.2 % (ref 0–7)
ERYTHROCYTE [DISTWIDTH] IN BLOOD BY AUTOMATED COUNT: 15.1 % (ref 11.5–14.5)
GLUCOSE SERPL-MCNC: 145 MG/DL (ref 70–99)
GRAN #: 6.16 10 (ref 2–7.5)
GRAN%: 0.4 %
GRAN%: 65.3 % (ref 50–80)
HCT VFR BLD AUTO: 35.6 % (ref 37.7–47.9)
HGB BLD-MCNC: 11.1 G/DL (ref 12.2–16.2)
IMMATURE GRANULOCYTES #: 0.04 10
IRON: 51 UG/DL (ref 50–170)
LYMPH #: 1.8 10 (ref 1–3.5)
LYMPH%: 19 % (ref 12–50)
MAGNESIUM SERPL-MCNC: 2.08 MG/DL (ref 1.8–2.4)
MCH RBC QN AUTO: 28.5 PG (ref 27–31)
MCHC RBC AUTO-ENTMCNC: 31.2 G% (ref 32–35)
MCV RBC AUTO: 91.5 FL (ref 80–97)
MONO #: 0.8 10 (ref 0–0.8)
MONO%: 8.3 % (ref 0–12)
OSMOC: 293 MOSM/KG (ref 275–295)
PMV BLD AUTO: 10.1 FL (ref 7.4–10.4)
PMV BLD AUTO: 452 10 (ref 142–424)
POTASSIUM SERPL-SCNC: 3.7 MMOL/L (ref 3.5–5.1)
RBC # BLD AUTO: 3.89 M/UL (ref 4.04–5.48)
SODIUM BLD-SCNC: 141 MMOL/L (ref 136–145)
WBC # BLD AUTO: 9.4 10 (ref 4–10.2)

## 2020-05-11 LAB
P-ANCA ATYPICAL SER QL IF: NORMAL TITER
SACCHAROMYCES CEREVISIAE, IGA: <20 UNITS (ref 0–24.9)
SACCHAROMYCES CEREVISIAE, IGG: <20 UNITS (ref 0–24.9)

## 2020-06-29 ENCOUNTER — HISTORICAL (OUTPATIENT)
Dept: ADMINISTRATIVE | Facility: HOSPITAL | Age: 80
End: 2020-06-29

## 2020-06-29 LAB
ALBUMIN SERPL BCP-MCNC: 3.4 G/DL (ref 3.5–5)
ALBUMIN/GLOB SERPL ELPH: 0.8 {RATIO} (ref 1.5–2.2)
ALP SERPL-CCNC: 124 U/L (ref 45–117)
ALT SERPL W P-5'-P-CCNC: 15 U/L (ref 13–56)
ANION GAP SERPL CALC-SCNC: 10.8 MEQ/L (ref 10–20)
APTT PPP: 22.3 SEC (ref 23–30.4)
AST SERPL-CCNC: 13 U/L (ref 15–37)
BASOPHILS NFR BLD: 0.1 10 (ref 0–0.1)
BASOPHILS NFR BLD: 0.8 % (ref 0–1.5)
BILIRUB SERPL-MCNC: 0.18 MG/DL (ref 0.2–1)
BUN SERPL-MCNC: 28 MG/DL (ref 7–18)
CALCIUM SERPL-MCNC: 9.2 MG/DL (ref 8.5–10.1)
CHLORIDE SERPL-SCNC: 107 MMOL/L (ref 98–107)
CO2 SERPL-SCNC: 25 MMOL/L (ref 22–32)
CREAT SERPL-MCNC: 2.19 MG/DL (ref 0.55–1.02)
EGFR: 23 ML/MIN/1.73M
EOSINOPHIL NFR BLD: 0.3 10 (ref 0–0.7)
EOSINOPHIL NFR BLD: 4.4 % (ref 0–7)
ERYTHROCYTE [DISTWIDTH] IN BLOOD BY AUTOMATED COUNT: 14.6 % (ref 11.5–14.5)
GLOBULIN: 4.5 G/DL (ref 2.3–3.5)
GLUCOSE SERPL-MCNC: 95 MG/DL (ref 70–99)
GRAN #: 4.86 10 (ref 2–7.5)
GRAN%: 0.4 %
GRAN%: 67.2 % (ref 50–80)
HCT VFR BLD AUTO: 36.6 % (ref 37.7–47.9)
HGB BLD-MCNC: 11.6 G/DL (ref 12.2–16.2)
IMMATURE GRANULOCYTES #: 0.03 10
INR PPP: 0.9 (ref 0.9–1.2)
LYMPH #: 1.5 10 (ref 1–3.5)
LYMPH%: 20.2 % (ref 12–50)
MCH RBC QN AUTO: 28.7 PG (ref 27–31)
MCHC RBC AUTO-ENTMCNC: 31.7 G% (ref 32–35)
MCV RBC AUTO: 90.6 FL (ref 80–97)
MONO #: 0.5 10 (ref 0–0.8)
MONO%: 7 % (ref 0–12)
OSMOC: 283 MOSM/KG (ref 275–295)
PMV BLD AUTO: 367 10 (ref 142–424)
PMV BLD AUTO: 9.3 FL (ref 7.4–10.4)
POTASSIUM SERPL-SCNC: 3.8 MMOL/L (ref 3.5–5.1)
PROT SERPL-MCNC: 7.9 G/DL (ref 6.4–8.2)
PROTHROMBIN TIME: 9.4 SEC (ref 9.8–12.4)
RBC # BLD AUTO: 4.04 M/UL (ref 4.04–5.48)
SARS-COV-2 RNA RESP QL NAA+PROBE: NOT DETECTED
SARSCOV2 INTERNAL CONTROL: NORMAL
SODIUM BLD-SCNC: 139 MMOL/L (ref 136–145)
WBC # BLD AUTO: 7.2 10 (ref 4–10.2)

## 2020-07-01 LAB
CK SERPL-CCNC: 39 U/L (ref 26–192)
CPK MB: <1 NG/ML (ref 0–3.6)
INDEX: 0 % (ref 0–2.5)
TROPONIN I SERPL DL<=0.01 NG/ML-MCNC: <0.02 NG/ML (ref 0–0.05)

## 2020-12-19 ENCOUNTER — HOSPITAL ENCOUNTER (INPATIENT)
Facility: HOSPITAL | Age: 80
LOS: 11 days | Discharge: NURSING, REHAB OR LONG-TERM CARE | DRG: 871 | End: 2020-12-30
Attending: EMERGENCY MEDICINE | Admitting: EMERGENCY MEDICINE
Payer: MEDICARE

## 2020-12-19 DIAGNOSIS — R53.1 WEAKNESS: ICD-10-CM

## 2020-12-19 DIAGNOSIS — K52.9 COLITIS: Primary | ICD-10-CM

## 2020-12-19 DIAGNOSIS — A41.9 SEPTIC SHOCK: ICD-10-CM

## 2020-12-19 DIAGNOSIS — R65.21 SEPTIC SHOCK: ICD-10-CM

## 2020-12-19 PROBLEM — N17.9 ACUTE RENAL FAILURE: Status: ACTIVE | Noted: 2020-12-19

## 2020-12-19 PROBLEM — N39.0 UTI (URINARY TRACT INFECTION): Status: ACTIVE | Noted: 2020-12-19

## 2020-12-19 LAB
ALBUMIN SERPL BCP-MCNC: 3 G/DL (ref 3.5–5.2)
ALP SERPL-CCNC: 398 U/L (ref 55–135)
ALT SERPL W/O P-5'-P-CCNC: 22 U/L (ref 10–44)
AMPHET+METHAMPHET UR QL: NEGATIVE
ANION GAP SERPL CALC-SCNC: 8 MMOL/L (ref 8–16)
ANION GAP SERPL CALC-SCNC: 9 MMOL/L (ref 8–16)
APTT BLDCRRT: 64.6 SEC (ref 21–32)
AST SERPL-CCNC: 37 U/L (ref 10–40)
BACTERIA #/AREA URNS HPF: ABNORMAL /HPF
BARBITURATES UR QL SCN>200 NG/ML: NEGATIVE
BASOPHILS # BLD AUTO: 0.08 K/UL (ref 0–0.2)
BASOPHILS NFR BLD: 0.3 % (ref 0–1.9)
BENZODIAZ UR QL SCN>200 NG/ML: NORMAL
BILIRUB SERPL-MCNC: 0.5 MG/DL (ref 0.1–1)
BILIRUB UR QL STRIP: NEGATIVE
BUN SERPL-MCNC: 54 MG/DL (ref 8–23)
BUN SERPL-MCNC: 57 MG/DL (ref 8–23)
BZE UR QL SCN: NEGATIVE
CALCIUM SERPL-MCNC: 8.9 MG/DL (ref 8.7–10.5)
CALCIUM SERPL-MCNC: 9.3 MG/DL (ref 8.7–10.5)
CANNABINOIDS UR QL SCN: NEGATIVE
CHLORIDE SERPL-SCNC: 104 MMOL/L (ref 95–110)
CHLORIDE SERPL-SCNC: 108 MMOL/L (ref 95–110)
CLARITY UR: ABNORMAL
CO2 SERPL-SCNC: 19 MMOL/L (ref 23–29)
CO2 SERPL-SCNC: 20 MMOL/L (ref 23–29)
COLOR UR: YELLOW
CORRECTED TEMPERATURE (PCO2): 29.9 MMHG
CORRECTED TEMPERATURE (PH): 7.35
CORRECTED TEMPERATURE (PO2): 79 MMHG
CREAT SERPL-MCNC: 3.9 MG/DL (ref 0.5–1.4)
CREAT SERPL-MCNC: 4.2 MG/DL (ref 0.5–1.4)
CREAT UR-MCNC: 54 MG/DL (ref 15–325)
CTP QC/QA: YES
DIFFERENTIAL METHOD: ABNORMAL
EOSINOPHIL # BLD AUTO: 0.1 K/UL (ref 0–0.5)
EOSINOPHIL NFR BLD: 0.3 % (ref 0–8)
ERYTHROCYTE [DISTWIDTH] IN BLOOD BY AUTOMATED COUNT: 16.5 % (ref 11.5–14.5)
EST. GFR  (AFRICAN AMERICAN): 11 ML/MIN/1.73 M^2
EST. GFR  (AFRICAN AMERICAN): 12 ML/MIN/1.73 M^2
EST. GFR  (NON AFRICAN AMERICAN): 10.4 ML/MIN/1.73 M^2
EST. GFR  (NON AFRICAN AMERICAN): 9.5 ML/MIN/1.73 M^2
FIO2: 21 %
GLUCOSE SERPL-MCNC: 119 MG/DL (ref 70–110)
GLUCOSE SERPL-MCNC: 97 MG/DL (ref 70–110)
GLUCOSE UR QL STRIP: NEGATIVE
HCO3 UR-SCNC: 17.8 MMOL/L
HCT VFR BLD AUTO: 27.6 % (ref 37–48.5)
HCT VFR BLD AUTO: 34.5 % (ref 37–48.5)
HGB BLD-MCNC: 10.8 G/DL (ref 12–16)
HGB BLD-MCNC: 8.5 G/DL (ref 12–16)
HGB UR QL STRIP: ABNORMAL
HYALINE CASTS #/AREA URNS LPF: 0 /LPF
IMM GRANULOCYTES # BLD AUTO: 0.32 K/UL (ref 0–0.04)
IMM GRANULOCYTES NFR BLD AUTO: 1 % (ref 0–0.5)
INR PPP: 0.8 (ref 0.8–1.2)
KETONES UR QL STRIP: NEGATIVE
LACTATE SERPL-SCNC: 1 MMOL/L (ref 0.5–2.2)
LEUKOCYTE ESTERASE UR QL STRIP: ABNORMAL
LIPASE SERPL-CCNC: 209 U/L (ref 23–300)
LYMPHOCYTES # BLD AUTO: 1.7 K/UL (ref 1–4.8)
LYMPHOCYTES NFR BLD: 5.6 % (ref 18–48)
MAGNESIUM SERPL-MCNC: 3.5 MG/DL (ref 1.6–2.6)
MCH RBC QN AUTO: 28.4 PG (ref 27–31)
MCHC RBC AUTO-ENTMCNC: 31.3 G/DL (ref 32–36)
MCV RBC AUTO: 91 FL (ref 82–98)
METHADONE UR QL SCN>300 NG/ML: NEGATIVE
MICROSCOPIC COMMENT: ABNORMAL
MODIFIED ALLEN'S TEST: ABNORMAL
MONOCYTES # BLD AUTO: 1.9 K/UL (ref 0.3–1)
MONOCYTES NFR BLD: 6.2 % (ref 4–15)
NEUTROPHILS # BLD AUTO: 26.9 K/UL (ref 1.8–7.7)
NEUTROPHILS NFR BLD: 86.6 % (ref 38–73)
NITRITE UR QL STRIP: NEGATIVE
NOTIFIED BY: ABNORMAL
NRBC BLD-RTO: 0 /100 WBC
O2DEVICE: ABNORMAL
OB PNL STL: POSITIVE
OPIATES UR QL SCN: NEGATIVE
PCO2 BLDA: 29.9 MMHG (ref 35–45)
PCP UR QL SCN>25 NG/ML: NEGATIVE
PH SMN: 7.35 [PH] (ref 7.34–7.45)
PH UR STRIP: 6 [PH] (ref 5–8)
PLATELET # BLD AUTO: 444 K/UL (ref 150–350)
PMV BLD AUTO: 10 FL (ref 9.2–12.9)
PO2 BLDA: 79 MMHG (ref 80–100)
POC BASE DEFICIT: -9.2 MMOL/L
POC NOTIFIED NOTE: ABNORMAL
POC PERFORMED BY: ABNORMAL
POC SATURATED O2: 93.5 %
POC TEMPERATURE: 37 C
POCT GLUCOSE: 138 MG/DL (ref 70–110)
POCT GLUCOSE: 85 MG/DL (ref 70–110)
POCT GLUCOSE: 97 MG/DL (ref 70–110)
POTASSIUM SERPL-SCNC: 5.9 MMOL/L (ref 3.5–5.1)
POTASSIUM SERPL-SCNC: 6.4 MMOL/L (ref 3.5–5.1)
PROT SERPL-MCNC: 7.2 G/DL (ref 6–8.4)
PROT UR QL STRIP: ABNORMAL
PROTHROMBIN TIME: 10.2 SEC (ref 9–12.5)
PROVIDER NOTIFIED: ABNORMAL
RBC # BLD AUTO: 3.8 M/UL (ref 4–5.4)
RBC #/AREA URNS HPF: 20 /HPF (ref 0–4)
SARS-COV-2 RDRP RESP QL NAA+PROBE: NEGATIVE
SODIUM SERPL-SCNC: 133 MMOL/L (ref 136–145)
SODIUM SERPL-SCNC: 135 MMOL/L (ref 136–145)
SP GR UR STRIP: 1.01 (ref 1–1.03)
SPECIMEN SOURCE: ABNORMAL
SQUAMOUS #/AREA URNS HPF: 15 /HPF
T4 FREE SERPL-MCNC: 0.28 NG/DL (ref 0.71–1.51)
TOXICOLOGY INFORMATION: NORMAL
TROPONIN I SERPL DL<=0.01 NG/ML-MCNC: <0.02 NG/ML (ref 0–0.03)
TSH SERPL DL<=0.005 MIU/L-ACNC: 309 UIU/ML (ref 0.4–4)
URN SPEC COLLECT METH UR: ABNORMAL
UROBILINOGEN UR STRIP-ACNC: NEGATIVE EU/DL
WBC # BLD AUTO: 31.01 K/UL (ref 3.9–12.7)
WBC #/AREA URNS HPF: >100 /HPF (ref 0–5)

## 2020-12-19 PROCEDURE — 36415 COLL VENOUS BLD VENIPUNCTURE: CPT

## 2020-12-19 PROCEDURE — 96375 TX/PRO/DX INJ NEW DRUG ADDON: CPT

## 2020-12-19 PROCEDURE — 25000003 PHARM REV CODE 250: Performed by: INTERNAL MEDICINE

## 2020-12-19 PROCEDURE — U0002 COVID-19 LAB TEST NON-CDC: HCPCS | Performed by: EMERGENCY MEDICINE

## 2020-12-19 PROCEDURE — 80053 COMPREHEN METABOLIC PANEL: CPT

## 2020-12-19 PROCEDURE — 63600175 PHARM REV CODE 636 W HCPCS: Performed by: EMERGENCY MEDICINE

## 2020-12-19 PROCEDURE — 99900035 HC TECH TIME PER 15 MIN (STAT)

## 2020-12-19 PROCEDURE — 85730 THROMBOPLASTIN TIME PARTIAL: CPT

## 2020-12-19 PROCEDURE — 84484 ASSAY OF TROPONIN QUANT: CPT

## 2020-12-19 PROCEDURE — 83690 ASSAY OF LIPASE: CPT

## 2020-12-19 PROCEDURE — 87186 SC STD MICRODIL/AGAR DIL: CPT

## 2020-12-19 PROCEDURE — 94760 N-INVAS EAR/PLS OXIMETRY 1: CPT

## 2020-12-19 PROCEDURE — 85014 HEMATOCRIT: CPT

## 2020-12-19 PROCEDURE — 25000003 PHARM REV CODE 250: Performed by: EMERGENCY MEDICINE

## 2020-12-19 PROCEDURE — 36600 WITHDRAWAL OF ARTERIAL BLOOD: CPT

## 2020-12-19 PROCEDURE — 93010 ELECTROCARDIOGRAM REPORT: CPT | Mod: ,,, | Performed by: INTERNAL MEDICINE

## 2020-12-19 PROCEDURE — 85018 HEMOGLOBIN: CPT

## 2020-12-19 PROCEDURE — 81000 URINALYSIS NONAUTO W/SCOPE: CPT | Mod: 59

## 2020-12-19 PROCEDURE — 80048 BASIC METABOLIC PNL TOTAL CA: CPT

## 2020-12-19 PROCEDURE — 84443 ASSAY THYROID STIM HORMONE: CPT

## 2020-12-19 PROCEDURE — 93010 EKG 12-LEAD: ICD-10-PCS | Mod: ,,, | Performed by: INTERNAL MEDICINE

## 2020-12-19 PROCEDURE — 85025 COMPLETE CBC W/AUTO DIFF WBC: CPT

## 2020-12-19 PROCEDURE — 80307 DRUG TEST PRSMV CHEM ANLYZR: CPT

## 2020-12-19 PROCEDURE — 99291 CRITICAL CARE FIRST HOUR: CPT | Mod: 25

## 2020-12-19 PROCEDURE — 20000000 HC ICU ROOM

## 2020-12-19 PROCEDURE — C9113 INJ PANTOPRAZOLE SODIUM, VIA: HCPCS | Performed by: INTERNAL MEDICINE

## 2020-12-19 PROCEDURE — 83735 ASSAY OF MAGNESIUM: CPT

## 2020-12-19 PROCEDURE — 82272 OCCULT BLD FECES 1-3 TESTS: CPT

## 2020-12-19 PROCEDURE — 82803 BLOOD GASES ANY COMBINATION: CPT

## 2020-12-19 PROCEDURE — 27000221 HC OXYGEN, UP TO 24 HOURS

## 2020-12-19 PROCEDURE — 87086 URINE CULTURE/COLONY COUNT: CPT

## 2020-12-19 PROCEDURE — 87077 CULTURE AEROBIC IDENTIFY: CPT

## 2020-12-19 PROCEDURE — 83605 ASSAY OF LACTIC ACID: CPT

## 2020-12-19 PROCEDURE — 63600175 PHARM REV CODE 636 W HCPCS: Performed by: INTERNAL MEDICINE

## 2020-12-19 PROCEDURE — 87088 URINE BACTERIA CULTURE: CPT

## 2020-12-19 PROCEDURE — 87040 BLOOD CULTURE FOR BACTERIA: CPT

## 2020-12-19 PROCEDURE — 93005 ELECTROCARDIOGRAM TRACING: CPT

## 2020-12-19 PROCEDURE — 87507 IADNA-DNA/RNA PROBE TQ 12-25: CPT

## 2020-12-19 PROCEDURE — 84439 ASSAY OF FREE THYROXINE: CPT

## 2020-12-19 PROCEDURE — 85610 PROTHROMBIN TIME: CPT

## 2020-12-19 PROCEDURE — S0030 INJECTION, METRONIDAZOLE: HCPCS | Performed by: EMERGENCY MEDICINE

## 2020-12-19 PROCEDURE — 96365 THER/PROPH/DIAG IV INF INIT: CPT

## 2020-12-19 RX ORDER — PAROXETINE HYDROCHLORIDE 20 MG/1
20 TABLET, FILM COATED ORAL EVERY MORNING
COMMUNITY

## 2020-12-19 RX ORDER — CARVEDILOL 3.12 MG/1
3.12 TABLET ORAL 2 TIMES DAILY WITH MEALS
Status: DISCONTINUED | OUTPATIENT
Start: 2020-12-19 | End: 2020-12-19

## 2020-12-19 RX ORDER — GABAPENTIN 100 MG/1
100 CAPSULE ORAL 2 TIMES DAILY
Status: DISCONTINUED | OUTPATIENT
Start: 2020-12-19 | End: 2020-01-01 | Stop reason: HOSPADM

## 2020-12-19 RX ORDER — DEXTROSE 50 % IN WATER (D50W) INTRAVENOUS SYRINGE
25
Status: COMPLETED | OUTPATIENT
Start: 2020-12-19 | End: 2020-12-19

## 2020-12-19 RX ORDER — METRONIDAZOLE 500 MG/100ML
500 INJECTION, SOLUTION INTRAVENOUS
Status: COMPLETED | OUTPATIENT
Start: 2020-12-19 | End: 2020-12-19

## 2020-12-19 RX ORDER — SUCRALFATE 1 G/1
1 TABLET ORAL 4 TIMES DAILY
Status: DISCONTINUED | OUTPATIENT
Start: 2020-12-19 | End: 2020-01-01 | Stop reason: HOSPADM

## 2020-12-19 RX ORDER — PANTOPRAZOLE SODIUM 40 MG/10ML
40 INJECTION, POWDER, LYOPHILIZED, FOR SOLUTION INTRAVENOUS 2 TIMES DAILY
Status: DISCONTINUED | OUTPATIENT
Start: 2020-12-19 | End: 2020-01-01 | Stop reason: HOSPADM

## 2020-12-19 RX ORDER — CIPROFLOXACIN 2 MG/ML
400 INJECTION, SOLUTION INTRAVENOUS
Status: DISCONTINUED | OUTPATIENT
Start: 2020-12-19 | End: 2020-12-21

## 2020-12-19 RX ORDER — CARVEDILOL 3.12 MG/1
3.12 TABLET ORAL 2 TIMES DAILY WITH MEALS
COMMUNITY
End: 2021-01-01 | Stop reason: CLARIF

## 2020-12-19 RX ORDER — SODIUM CHLORIDE 9 MG/ML
INJECTION, SOLUTION INTRAVENOUS CONTINUOUS
Status: DISCONTINUED | OUTPATIENT
Start: 2020-12-19 | End: 2020-12-20

## 2020-12-19 RX ORDER — CALCIUM GLUCONATE 20 MG/ML
1000 INJECTION, SOLUTION INTRAVENOUS
Status: COMPLETED | OUTPATIENT
Start: 2020-12-19 | End: 2020-12-19

## 2020-12-19 RX ORDER — IBUPROFEN 200 MG
16 TABLET ORAL
Status: DISCONTINUED | OUTPATIENT
Start: 2020-12-19 | End: 2020-12-19

## 2020-12-19 RX ORDER — MONTELUKAST SODIUM 10 MG/1
10 TABLET ORAL NIGHTLY
Status: DISCONTINUED | OUTPATIENT
Start: 2020-12-19 | End: 2020-01-01 | Stop reason: HOSPADM

## 2020-12-19 RX ORDER — IBUPROFEN 200 MG
24 TABLET ORAL
Status: DISCONTINUED | OUTPATIENT
Start: 2020-12-19 | End: 2020-12-19

## 2020-12-19 RX ORDER — SODIUM BICARBONATE 42 MG/ML
50 INJECTION, SOLUTION INTRAVENOUS ONCE
Status: DISCONTINUED | OUTPATIENT
Start: 2020-12-19 | End: 2020-12-19

## 2020-12-19 RX ORDER — NOREPINEPHRINE BITARTRATE/D5W 4MG/250ML
0.05 PLASTIC BAG, INJECTION (ML) INTRAVENOUS CONTINUOUS
Status: DISCONTINUED | OUTPATIENT
Start: 2020-12-19 | End: 2020-12-23

## 2020-12-19 RX ORDER — PAROXETINE HYDROCHLORIDE 20 MG/1
20 TABLET, FILM COATED ORAL EVERY MORNING
Status: DISCONTINUED | OUTPATIENT
Start: 2020-12-19 | End: 2020-12-19

## 2020-12-19 RX ORDER — SODIUM BICARBONATE 1 MEQ/ML
50 SYRINGE (ML) INTRAVENOUS
Status: COMPLETED | OUTPATIENT
Start: 2020-12-19 | End: 2020-12-19

## 2020-12-19 RX ORDER — GLUCAGON 1 MG
1 KIT INJECTION
Status: DISCONTINUED | OUTPATIENT
Start: 2020-12-19 | End: 2020-01-01 | Stop reason: HOSPADM

## 2020-12-19 RX ORDER — ONDANSETRON 2 MG/ML
4 INJECTION INTRAMUSCULAR; INTRAVENOUS EVERY 8 HOURS PRN
Status: DISCONTINUED | OUTPATIENT
Start: 2020-12-19 | End: 2020-01-01 | Stop reason: HOSPADM

## 2020-12-19 RX ORDER — SODIUM CHLORIDE 0.9 % (FLUSH) 0.9 %
10 SYRINGE (ML) INJECTION
Status: DISCONTINUED | OUTPATIENT
Start: 2020-12-19 | End: 2020-01-01 | Stop reason: HOSPADM

## 2020-12-19 RX ORDER — TALC
6 POWDER (GRAM) TOPICAL NIGHTLY PRN
Status: DISCONTINUED | OUTPATIENT
Start: 2020-12-19 | End: 2020-01-01 | Stop reason: HOSPADM

## 2020-12-19 RX ORDER — MONTELUKAST SODIUM 10 MG/1
10 TABLET ORAL NIGHTLY
COMMUNITY

## 2020-12-19 RX ORDER — MORPHINE SULFATE 2 MG/ML
1 INJECTION, SOLUTION INTRAMUSCULAR; INTRAVENOUS EVERY 4 HOURS PRN
Status: DISCONTINUED | OUTPATIENT
Start: 2020-12-19 | End: 2020-12-22

## 2020-12-19 RX ORDER — IBUPROFEN 200 MG
24 TABLET ORAL
Status: DISCONTINUED | OUTPATIENT
Start: 2020-12-19 | End: 2020-12-24

## 2020-12-19 RX ORDER — METRONIDAZOLE 500 MG/100ML
500 INJECTION, SOLUTION INTRAVENOUS
Status: DISCONTINUED | OUTPATIENT
Start: 2020-12-19 | End: 2020-01-01 | Stop reason: HOSPADM

## 2020-12-19 RX ORDER — PANTOPRAZOLE SODIUM 40 MG/1
40 TABLET, DELAYED RELEASE ORAL DAILY
Status: ON HOLD | COMMUNITY
End: 2021-01-01

## 2020-12-19 RX ORDER — IBUPROFEN 200 MG
16 TABLET ORAL
Status: DISCONTINUED | OUTPATIENT
Start: 2020-12-19 | End: 2020-12-24

## 2020-12-19 RX ORDER — SUCRALFATE 1 G/1
1 TABLET ORAL 4 TIMES DAILY
COMMUNITY

## 2020-12-19 RX ORDER — GLUCAGON 1 MG
1 KIT INJECTION
Status: DISCONTINUED | OUTPATIENT
Start: 2020-12-19 | End: 2020-12-19

## 2020-12-19 RX ORDER — CLOPIDOGREL BISULFATE 75 MG/1
75 TABLET ORAL DAILY
Status: ON HOLD | COMMUNITY
End: 2021-01-01 | Stop reason: HOSPADM

## 2020-12-19 RX ORDER — PANTOPRAZOLE SODIUM 40 MG/1
40 TABLET, DELAYED RELEASE ORAL DAILY
Status: DISCONTINUED | OUTPATIENT
Start: 2020-12-19 | End: 2020-12-19

## 2020-12-19 RX ORDER — CLOPIDOGREL BISULFATE 75 MG/1
75 TABLET ORAL DAILY
Status: DISCONTINUED | OUTPATIENT
Start: 2020-12-19 | End: 2020-12-19

## 2020-12-19 RX ORDER — CIPROFLOXACIN 2 MG/ML
400 INJECTION, SOLUTION INTRAVENOUS
Status: COMPLETED | OUTPATIENT
Start: 2020-12-19 | End: 2020-12-19

## 2020-12-19 RX ADMIN — MONTELUKAST 10 MG: 10 TABLET, FILM COATED ORAL at 09:12

## 2020-12-19 RX ADMIN — ONDANSETRON 4 MG: 2 INJECTION INTRAMUSCULAR; INTRAVENOUS at 03:12

## 2020-12-19 RX ADMIN — INSULIN HUMAN 8 UNITS: 100 INJECTION, SOLUTION PARENTERAL at 08:12

## 2020-12-19 RX ADMIN — SODIUM CHLORIDE: 0.9 INJECTION, SOLUTION INTRAVENOUS at 06:12

## 2020-12-19 RX ADMIN — METRONIDAZOLE 500 MG: 500 INJECTION, SOLUTION INTRAVENOUS at 04:12

## 2020-12-19 RX ADMIN — METRONIDAZOLE 500 MG: 500 INJECTION, SOLUTION INTRAVENOUS at 08:12

## 2020-12-19 RX ADMIN — GABAPENTIN 100 MG: 100 CAPSULE ORAL at 11:12

## 2020-12-19 RX ADMIN — SODIUM BICARBONATE 50 MEQ: 84 INJECTION, SOLUTION INTRAVENOUS at 10:12

## 2020-12-19 RX ADMIN — PANTOPRAZOLE SODIUM 40 MG: 40 INJECTION, POWDER, FOR SOLUTION INTRAVENOUS at 09:12

## 2020-12-19 RX ADMIN — MORPHINE SULFATE 1 MG: 2 INJECTION, SOLUTION INTRAMUSCULAR; INTRAVENOUS at 09:12

## 2020-12-19 RX ADMIN — CIPROFLOXACIN 400 MG: 2 INJECTION, SOLUTION INTRAVENOUS at 10:12

## 2020-12-19 RX ADMIN — DEXTROSE MONOHYDRATE 25 G: 25 INJECTION, SOLUTION INTRAVENOUS at 08:12

## 2020-12-19 RX ADMIN — SODIUM CHLORIDE: 0.9 INJECTION, SOLUTION INTRAVENOUS at 09:12

## 2020-12-19 RX ADMIN — SODIUM POLYSTYRENE SULFONATE 15 G: 15 SUSPENSION ORAL; RECTAL at 10:12

## 2020-12-19 RX ADMIN — CIPROFLOXACIN 400 MG: 2 INJECTION, SOLUTION INTRAVENOUS at 08:12

## 2020-12-19 RX ADMIN — Medication 0.18 MCG/KG/MIN: at 10:12

## 2020-12-19 RX ADMIN — CALCIUM GLUCONATE 1000 MG: 20 INJECTION, SOLUTION INTRAVENOUS at 08:12

## 2020-12-19 RX ADMIN — SUCRALFATE 1 G: 1 TABLET ORAL at 04:12

## 2020-12-19 RX ADMIN — SODIUM CHLORIDE 1000 ML: 0.9 INJECTION, SOLUTION INTRAVENOUS at 07:12

## 2020-12-19 RX ADMIN — SODIUM CHLORIDE 1000 ML: 0.9 INJECTION, SOLUTION INTRAVENOUS at 09:12

## 2020-12-19 RX ADMIN — SUCRALFATE 1 G: 1 TABLET ORAL at 01:12

## 2020-12-19 RX ADMIN — Medication 0.05 MCG/KG/MIN: at 09:12

## 2020-12-19 RX ADMIN — PANTOPRAZOLE SODIUM 40 MG: 40 TABLET, DELAYED RELEASE ORAL at 10:12

## 2020-12-19 RX ADMIN — GABAPENTIN 100 MG: 100 CAPSULE ORAL at 09:12

## 2020-12-19 NOTE — ED PROVIDER NOTES
Encounter Date: 2020       History     Chief Complaint   Patient presents with    Fatigue     Per EMS, Fly reports a rough night with pt complaining of back pain, and abdominal pain with an episode of diarrhea after a period of constipation. EMS reports patient to be very lethargic and altered. Pt answers questions appropriately at time of triage.    Back Pain    Abdominal Pain     This is a 80-year-old white female history of chronic pain, colitis, diabetes mellitus, presents to the emergency department with back pain and diarrhea after constipation.  EMS reports blood pressure was low upon arrival to the patient's house, but his increased since starting fluids.        Review of patient's allergies indicates:   Allergen Reactions    Motrin [ibuprofen] Other (See Comments)     Feels like heart is swelling    Penicillins Hives and Itching    Sulfa (sulfonamide antibiotics) Nausea And Vomiting     Past Medical History:   Diagnosis Date    Blood disorder     Colitis     Coronary artery disease     Diabetes mellitus     GERD (gastroesophageal reflux disease)     Heart disease, unspecified     Memory loss     Stroke      Past Surgical History:   Procedure Laterality Date    APPENDECTOMY      BILATERAL SALPINGOOPHORECTOMY       SECTION      CHOLECYSTECTOMY      DILATION AND CURETTAGE OF UTERUS      HYSTERECTOMY  age 27    bleeding    KNEE SURGERY      SHOULDER OPEN ROTATOR CUFF REPAIR       Family History   Problem Relation Age of Onset    Migraines Mother     Cancer Maternal Grandmother         liver, lung    Cancer Paternal Grandfather     Stroke Paternal Aunt     Migraines Paternal Aunt     Migraines Son     Aneurysm Neg Hx     Seizures Neg Hx      Social History     Tobacco Use    Smoking status: Former Smoker     Quit date: 2005     Years since quitting: 15.9   Substance Use Topics    Alcohol use: No    Drug use: No     Review of Systems   Constitutional: Positive  for fatigue. Negative for fever.   HENT: Negative for sore throat.    Respiratory: Negative for shortness of breath.    Cardiovascular: Negative for chest pain.   Gastrointestinal: Positive for blood in stool and diarrhea. Negative for nausea.   Genitourinary: Negative for dysuria.   Musculoskeletal: Negative for back pain.   Skin: Negative for rash.   Neurological: Positive for weakness.   Hematological: Does not bruise/bleed easily.   All other systems reviewed and are negative.      Physical Exam     Initial Vitals [12/19/20 0725]   BP Pulse Resp Temp SpO2   127/64 (!) 56 18 97.7 °F (36.5 °C) 99 %      MAP       --         Physical Exam    Nursing note and vitals reviewed.  Constitutional: She appears well-developed and well-nourished. She is not diaphoretic. No distress.   HENT:   Head: Normocephalic and atraumatic.   Eyes: Conjunctivae and EOM are normal. Pupils are equal, round, and reactive to light.   Neck: Normal range of motion. Neck supple.   Cardiovascular: Normal heart sounds and intact distal pulses.   No murmur heard.  Irregularly irregular rhythm with a rate of 55   Pulmonary/Chest: Breath sounds normal. No respiratory distress. She has no wheezes. She has no rhonchi. She has no rales. She exhibits no tenderness.   Abdominal: Soft. Bowel sounds are normal. She exhibits no distension. There is no rebound and no guarding.   Musculoskeletal: Normal range of motion. No tenderness or edema.   Neurological: She is alert and oriented to person, place, and time. She has normal strength and normal reflexes. No cranial nerve deficit. GCS eye subscore is 4. GCS verbal subscore is 5. GCS motor subscore is 6.   Patient is slow to answer questions, but does answer questions appropriately, GCS 14   Skin: Skin is warm and dry. Capillary refill takes less than 2 seconds.         ED Course   Critical Care    Date/Time: 12/19/2020 8:25 AM  Performed by: Mateo Mendez MD  Authorized by: Mateo Mendez MD   Direct  patient critical care time: 20 minutes  Additional history critical care time: 10 minutes  Ordering / reviewing critical care time: 10 minutes  Documentation critical care time: 10 minutes  Consulting other physicians critical care time: 5 minutes  Total critical care time (exclusive of procedural time) : 55 minutes        Labs Reviewed   CBC W/ AUTO DIFFERENTIAL - Abnormal; Notable for the following components:       Result Value    WBC 31.01 (*)     RBC 3.80 (*)     Hemoglobin 10.8 (*)     Hematocrit 34.5 (*)     MCHC 31.3 (*)     RDW 16.5 (*)     Platelets 444 (*)     Immature Granulocytes 1.0 (*)     Gran # (ANC) 26.9 (*)     Immature Grans (Abs) 0.32 (*)     Mono # 1.9 (*)     Gran % 86.6 (*)     Lymph % 5.6 (*)     All other components within normal limits   COMPREHENSIVE METABOLIC PANEL - Abnormal; Notable for the following components:    Sodium 133 (*)     Potassium 6.4 (*)     CO2 20 (*)     Glucose 119 (*)     BUN 57 (*)     Creatinine 4.2 (*)     Albumin 3.0 (*)     Alkaline Phosphatase 398 (*)     eGFR if  11.0 (*)     eGFR if non  9.5 (*)     All other components within normal limits    Narrative:     K   critical result(s) called and verbal readback obtained from    Deandra Zambrano by ROBLES 12/19/2020 08:14   MAGNESIUM - Abnormal; Notable for the following components:    Magnesium 3.5 (*)     All other components within normal limits   URINALYSIS, REFLEX TO URINE CULTURE - Abnormal; Notable for the following components:    Appearance, UA Cloudy (*)     Protein, UA 1+ (*)     Occult Blood UA 3+ (*)     Leukocytes, UA 3+ (*)     All other components within normal limits    Narrative:     Specimen Source->Urine   OCCULT BLOOD X 1, STOOL - Abnormal; Notable for the following components:    Occult Blood Positive (*)     All other components within normal limits   URINALYSIS MICROSCOPIC - Abnormal; Notable for the following components:    RBC, UA 20 (*)     WBC, UA >100 (*)      Bacteria Many (*)     All other components within normal limits    Narrative:     Specimen Source->Urine   CULTURE, BLOOD   CULTURE, BLOOD   CULTURE, URINE   LACTIC ACID, PLASMA   LIPASE   TROPONIN I   DRUG SCREEN PANEL, URINE EMERGENCY   PROTIME-INR   APTT   TSH   SARS-COV-2 RDRP GENE    Narrative:     This test utilizes isothermal nucleic acid amplification   technology to detect the SARS-CoV-2 RdRp nucleic acid segment.   The analytical sensitivity (limit of detection) is 125 genome   equivalents/mL.   A POSITIVE result implies infection with the SARS-CoV-2 virus;   the patient is presumed to be contagious.     A NEGATIVE result means that SARS-CoV-2 nucleic acids are not   present above the limit of detection. A NEGATIVE result should be   treated as presumptive. It does not rule out the possibility of   COVID-19 and should not be the sole basis for treatment decisions.   If COVID-19 is strongly suspected based on clinical and exposure   history, re-testing using an alternate molecular assay should be   considered.   This test is only for use under the Food and Drug   Administration s Emergency Use Authorization (EUA).   Commercial kits are provided by [employer].   Performance characteristics of the EUA have been independently   verified by [facility] Department of   Pathology and Laboratory Medicine.   _________________________________________________________________   The authorized Fact Sheet for Healthcare Providers and the authorized Fact   Sheet for Patients of the ID NOW COVID-19 are available on the FDA   website:     https://www.fda.gov/media/953512/download  https://www.fda.gov/media/713774/download         EKG Readings: (Independently Interpreted)   Rhythm: Atrial Fibrillation. Heart Rate: 59. Ectopy: No Ectopy. Conduction: RBBB. ST Segments: Normal ST Segments. T Waves: Normal. Axis: Normal. Clinical Impression: Atrial Fibrillation     ECG Results          EKG 12-lead (In  process)  Result time 12/19/20 08:27:06    In process by Interface, Lab In [facility] (12/19/20 08:27:06)                 Narrative:    Test Reason : R53.1,    Vent. Rate : 059 BPM     Atrial Rate : 040 BPM     P-R Int : 000 ms          QRS Dur : 152 ms      QT Int : 480 ms       P-R-T Axes : 000 248 -14 degrees     QTc Int : 475 ms    Atrial fibrillation with slow ventricular response with a competing  junctional pacemaker  Right bundle branch block  Possible Lateral infarct ,age undetermined  Abnormal ECG  No previous ECGs available    Referred By: [email or web address]   SELF           Confirmed By:                             Imaging Results          CT Abdomen Pelvis  Without Contrast (In process)                X-Ray Chest 1 View (In process)                  Medical Decision Making:   Differential Diagnosis:   Colitis, dehydration, hypotension, UTI                   ED Course as of Dec 19 0829   Sat Dec 19, 2020   0715 Chest x-ray is negative for acute change    [SD]   0814 CT results consistent with colitis, most pronounced in the descending and sigmoid colon    [SD]      ED Course User Index  [SD] Mateo Mendez MD            Clinical Impression:       ICD-10-CM ICD-9-CM   1. Colitis  K52.9 558.9   2. Weakness  R53.1 780.79                          ED Disposition Condition    Admit                             Mateo Mendez MD  12/19/20 0829       Mateo Mendez MD  12/19/20 0856

## 2020-12-19 NOTE — ED NOTES
CRITICAL POTASSIUM OF 6.4 REPORTED FROM [facility] IN LAB INFORMATION GIVEN TO DR. CASIANO AND KANCHAN RN

## 2020-12-19 NOTE — ED NOTES
Contacted [facility] for an ICU bed per Dr Sepulveda's request for admission status, per Denisa 2 Nurses and 4 Vented pts, they are not able to take another pt as of this time.

## 2020-12-19 NOTE — ED NOTES
Received a call from Dr. Hilliard's office, was told physician will not be back until Monday and patient will need to be transferred if nephrology is needed now. Dr. Mendez notified.

## 2020-12-19 NOTE — H&P
[facility] - Emergency Department    History & Physical      Patient Name: Smiley Harmon  MRN: [id number]  Admission Date: 2020  Attending Physician: Mateo Mendez MD   Primary Care Provider: Yani Carlos MD         Patient information was obtained from patient and ER records.     Subjective:     Principal Problem:Septic shock    Chief Complaint:   Chief Complaint   Patient presents with    Fatigue     Per EMS, Fly reports a rough night with pt complaining of back pain, and abdominal pain with an episode of diarrhea after a period of constipation. EMS reports patient to be very lethargic and altered. Pt answers questions appropriately at time of triage.    Back Pain    Abdominal Pain        HPI: 79 y/o female with significant medical history of cad, dm2, h/o cva, colitis presented to the ed with complaints of worsening diarrhea and low back pain and further work up suggestive of severe sepsis with colitis and shock with worse renal functions and further admitted to icu on aggressive ivf and pressors and iv abx and symptomatic management    Past Medical History:   Diagnosis Date    Blood disorder     Colitis     Coronary artery disease     Diabetes mellitus     GERD (gastroesophageal reflux disease)     Heart disease, unspecified     Memory loss     Stroke        Past Surgical History:   Procedure Laterality Date    APPENDECTOMY      BILATERAL SALPINGOOPHORECTOMY       SECTION      CHOLECYSTECTOMY      DILATION AND CURETTAGE OF UTERUS      HYSTERECTOMY  age 27    bleeding    KNEE SURGERY      SHOULDER OPEN ROTATOR CUFF REPAIR         Review of patient's allergies indicates:   Allergen Reactions    Motrin [ibuprofen] Other (See Comments)     Feels like heart is swelling    Penicillins Hives and Itching    Sulfa (sulfonamide antibiotics) Nausea And Vomiting       No current facility-administered medications on file prior to encounter.      Current Outpatient Medications  on File Prior to Encounter   Medication Sig    carvediloL (COREG) 3.125 MG tablet Take 3.125 mg by mouth 2 (two) times daily with meals.    clopidogreL (PLAVIX) 75 mg tablet Take 75 mg by mouth once daily.    gabapentin (NEURONTIN) 100 MG capsule TAKE 1 CAPSULE BY MOUTH TWICE DAILY    montelukast (SINGULAIR) 10 mg tablet Take 10 mg by mouth every evening.    pantoprazole (PROTONIX) 40 MG tablet Take 40 mg by mouth once daily.    paroxetine (PAXIL) 20 MG tablet Take 20 mg by mouth every morning.    sucralfate (CARAFATE) 1 gram tablet Take 1 g by mouth 4 (four) times daily.     Family History     Problem Relation (Age of Onset)    Cancer Maternal Grandmother, Paternal Grandfather    Migraines Mother, Paternal Aunt, Son    Stroke Paternal Aunt        Tobacco Use    Smoking status: Former Smoker     Quit date: 1/21/2005     Years since quitting: 15.9   Substance and Sexual Activity    Alcohol use: No    Drug use: No    Sexual activity: Not Currently     Partners: Male     Birth control/protection: Surgical, Post-menopausal     Comment:      Review of Systems   Constitutional: Negative.    HENT: Negative.    Eyes: Negative.    Respiratory: Negative.    Cardiovascular: Positive for palpitations.   Gastrointestinal: Positive for diarrhea.   Endocrine: Negative.    Genitourinary: Negative.    Musculoskeletal: Positive for back pain.   Skin: Negative.    Allergic/Immunologic: Negative.    Neurological: Negative.    Hematological: Negative.    Psychiatric/Behavioral: The patient is nervous/anxious.      Objective:     Vital Signs (Most Recent):  Temp: 97.7 °F (36.5 °C) (12/19/20 0725)  Pulse: 67 (12/19/20 0952)  Resp: 18 (12/19/20 0952)  BP: (!) 92/55 (12/19/20 0952)  SpO2: 100 % (12/19/20 0952) Vital Signs (24h Range):  Temp:  [97.7 °F (36.5 °C)] 97.7 °F (36.5 °C)  Pulse:  [38-67] 67  Resp:  [18] 18  SpO2:  [99 %-100 %] 100 %  BP: ()/(41-64) 92/55     Weight: 85.7 kg (189 lb)  Body mass index is  30.51 kg/m².    Physical Exam  Constitutional:       Appearance: Normal appearance. She is obese.   HENT:      Head: Normocephalic and atraumatic.      Right Ear: Tympanic membrane normal.      Left Ear: Tympanic membrane normal.      Nose: Nose normal.      Mouth/Throat:      Mouth: Mucous membranes are dry.   Eyes:      Pupils: Pupils are equal, round, and reactive to light.   Neck:      Musculoskeletal: Normal range of motion and neck supple.   Cardiovascular:      Rate and Rhythm: Regular rhythm. Tachycardia present.      Pulses: Normal pulses.      Heart sounds: Normal heart sounds.   Pulmonary:      Breath sounds: Normal breath sounds.   Abdominal:      General: Abdomen is flat. Bowel sounds are normal. There is distension.      Palpations: Abdomen is soft.      Tenderness: There is abdominal tenderness.   Musculoskeletal: Normal range of motion.   Skin:     General: Skin is warm and dry.      Capillary Refill: Capillary refill takes less than 2 seconds.   Neurological:      General: No focal deficit present.      Mental Status: She is alert.   Psychiatric:         Mood and Affect: Mood normal.         Behavior: Behavior normal.           CRANIAL NERVES     CN III, IV, VI   Pupils are equal, round, and reactive to light.      Significant Labs: All pertinent labs within the past 24 hours have been reviewed.  Lab Results   Component Value Date    WBC 31.01 (H) 12/19/2020    HGB 10.8 (L) 12/19/2020    HCT 34.5 (L) 12/19/2020    MCV 91 12/19/2020     (H) 12/19/2020       Recent Labs   Lab 12/19/20  0725   *   K 6.4*      CO2 20*   BUN 57*   CREATININE 4.2*   CALCIUM 9.3       Significant Imaging: I have reviewed and interpreted all pertinent imaging results/findings within the past 24 hours.    Assessment/Plan:     Active Diagnoses:    Diagnosis Date Noted POA    PRINCIPAL PROBLEM:  Septic shock [A41.9, R65.21] 12/19/2020 Yes    Colitis [K52.9] 12/19/2020 Yes      Problems Resolved During this  Admission:     VTE Risk Mitigation (From admission, onward)         Ordered     IP VTE HIGH RISK PATIENT  Once      12/19/20 0958     Place CAROLINE hose  Until discontinued      12/19/20 0958              Severe sepsis  Shock sec to above  Colitis  htn  Cad  H/o cva  Chronic lumbago metabolic acidosis  ros on ckd  Dm 2  uti poa  Metabolic acidosis  Hyperkalemia  fobt positive    Plan :  ivf  Wean off pressor support as elsa  Iv bicarb  Iv abx  Follow cx  Renal consult  Looks pale and h and h fairly ok  Will monitor for overt gi blled  Hold antiplatelets and anticoag  Continue with cc monitoring  Guarded prognosis  Labs in am  Gi and dvt ppx  Code status full  Cc time 31 min      Derick Clemons MD  Department of Hospital Medicine   [facility] - Emergency Department

## 2020-12-20 LAB
ABO + RH BLD: NORMAL
ALBUMIN SERPL BCP-MCNC: 2.3 G/DL (ref 3.5–5.2)
ALP SERPL-CCNC: 180 U/L (ref 55–135)
ALT SERPL W/O P-5'-P-CCNC: 13 U/L (ref 10–44)
ANION GAP SERPL CALC-SCNC: 7 MMOL/L (ref 8–16)
AST SERPL-CCNC: 21 U/L (ref 10–40)
BASOPHILS # BLD AUTO: 0.07 K/UL (ref 0–0.2)
BASOPHILS NFR BLD: 0.4 % (ref 0–1.9)
BILIRUB SERPL-MCNC: 0.2 MG/DL (ref 0.1–1)
BLD GP AB SCN CELLS X3 SERPL QL: NORMAL
BLD PROD TYP BPU: NORMAL
BLD PROD TYP BPU: NORMAL
BLOOD UNIT EXPIRATION DATE: NORMAL
BLOOD UNIT EXPIRATION DATE: NORMAL
BLOOD UNIT TYPE CODE: 9500
BLOOD UNIT TYPE CODE: 9500
BLOOD UNIT TYPE: NORMAL
BLOOD UNIT TYPE: NORMAL
BUN SERPL-MCNC: 54 MG/DL (ref 8–23)
CALCIUM SERPL-MCNC: 7.6 MG/DL (ref 8.7–10.5)
CHLORIDE SERPL-SCNC: 110 MMOL/L (ref 95–110)
CO2 SERPL-SCNC: 21 MMOL/L (ref 23–29)
CODING SYSTEM: NORMAL
CODING SYSTEM: NORMAL
CREAT SERPL-MCNC: 3.6 MG/DL (ref 0.5–1.4)
DIFFERENTIAL METHOD: ABNORMAL
DISPENSE STATUS: NORMAL
DISPENSE STATUS: NORMAL
EOSINOPHIL # BLD AUTO: 0.5 K/UL (ref 0–0.5)
EOSINOPHIL NFR BLD: 2.7 % (ref 0–8)
ERYTHROCYTE [DISTWIDTH] IN BLOOD BY AUTOMATED COUNT: 16.4 % (ref 11.5–14.5)
EST. GFR  (AFRICAN AMERICAN): 13.3 ML/MIN/1.73 M^2
EST. GFR  (NON AFRICAN AMERICAN): 11.5 ML/MIN/1.73 M^2
GLUCOSE SERPL-MCNC: 87 MG/DL (ref 70–110)
HCT VFR BLD AUTO: 24.7 % (ref 37–48.5)
HGB BLD-MCNC: 7.6 G/DL (ref 12–16)
IMM GRANULOCYTES # BLD AUTO: 0.08 K/UL (ref 0–0.04)
IMM GRANULOCYTES NFR BLD AUTO: 0.5 % (ref 0–0.5)
LYMPHOCYTES # BLD AUTO: 1.8 K/UL (ref 1–4.8)
LYMPHOCYTES NFR BLD: 10.9 % (ref 18–48)
MCH RBC QN AUTO: 28.1 PG (ref 27–31)
MCHC RBC AUTO-ENTMCNC: 30.8 G/DL (ref 32–36)
MCV RBC AUTO: 92 FL (ref 82–98)
MONOCYTES # BLD AUTO: 1 K/UL (ref 0.3–1)
MONOCYTES NFR BLD: 5.9 % (ref 4–15)
NEUTROPHILS # BLD AUTO: 13.2 K/UL (ref 1.8–7.7)
NEUTROPHILS NFR BLD: 79.6 % (ref 38–73)
NRBC BLD-RTO: 0 /100 WBC
NUM UNITS TRANS PACKED RBC: NORMAL
NUM UNITS TRANS PACKED RBC: NORMAL
PLATELET # BLD AUTO: 295 K/UL (ref 150–350)
PMV BLD AUTO: 10.4 FL (ref 9.2–12.9)
POCT GLUCOSE: 118 MG/DL (ref 70–110)
POCT GLUCOSE: 52 MG/DL (ref 70–110)
POCT GLUCOSE: 74 MG/DL (ref 70–110)
POCT GLUCOSE: 74 MG/DL (ref 70–110)
POCT GLUCOSE: <20 MG/DL (ref 70–110)
POCT GLUCOSE: <20 MG/DL (ref 70–110)
POTASSIUM SERPL-SCNC: 4.6 MMOL/L (ref 3.5–5.1)
PROT SERPL-MCNC: 5.5 G/DL (ref 6–8.4)
RBC # BLD AUTO: 2.7 M/UL (ref 4–5.4)
SODIUM SERPL-SCNC: 138 MMOL/L (ref 136–145)
WBC # BLD AUTO: 16.58 K/UL (ref 3.9–12.7)

## 2020-12-20 PROCEDURE — 94761 N-INVAS EAR/PLS OXIMETRY MLT: CPT

## 2020-12-20 PROCEDURE — 85025 COMPLETE CBC W/AUTO DIFF WBC: CPT

## 2020-12-20 PROCEDURE — 80053 COMPREHEN METABOLIC PANEL: CPT

## 2020-12-20 PROCEDURE — 36430 TRANSFUSION BLD/BLD COMPNT: CPT

## 2020-12-20 PROCEDURE — 63600175 PHARM REV CODE 636 W HCPCS: Performed by: EMERGENCY MEDICINE

## 2020-12-20 PROCEDURE — S5010 5% DEXTROSE AND 0.45% SALINE: HCPCS | Performed by: INTERNAL MEDICINE

## 2020-12-20 PROCEDURE — 63600175 PHARM REV CODE 636 W HCPCS: Performed by: INTERNAL MEDICINE

## 2020-12-20 PROCEDURE — C1751 CATH, INF, PER/CENT/MIDLINE: HCPCS

## 2020-12-20 PROCEDURE — C9113 INJ PANTOPRAZOLE SODIUM, VIA: HCPCS | Performed by: INTERNAL MEDICINE

## 2020-12-20 PROCEDURE — 25000003 PHARM REV CODE 250: Performed by: INTERNAL MEDICINE

## 2020-12-20 PROCEDURE — 86850 RBC ANTIBODY SCREEN: CPT

## 2020-12-20 PROCEDURE — 20000000 HC ICU ROOM

## 2020-12-20 PROCEDURE — 36415 COLL VENOUS BLD VENIPUNCTURE: CPT

## 2020-12-20 PROCEDURE — S0030 INJECTION, METRONIDAZOLE: HCPCS | Performed by: EMERGENCY MEDICINE

## 2020-12-20 PROCEDURE — 86920 COMPATIBILITY TEST SPIN: CPT

## 2020-12-20 PROCEDURE — P9016 RBC LEUKOCYTES REDUCED: HCPCS

## 2020-12-20 PROCEDURE — 25000003 PHARM REV CODE 250: Performed by: EMERGENCY MEDICINE

## 2020-12-20 RX ORDER — MUPIROCIN 20 MG/G
OINTMENT TOPICAL 2 TIMES DAILY
Status: COMPLETED | OUTPATIENT
Start: 2020-12-20 | End: 2020-12-24

## 2020-12-20 RX ORDER — HYDROCODONE BITARTRATE AND ACETAMINOPHEN 500; 5 MG/1; MG/1
TABLET ORAL
Status: DISCONTINUED | OUTPATIENT
Start: 2020-12-20 | End: 2020-01-01 | Stop reason: HOSPADM

## 2020-12-20 RX ORDER — DEXTROSE MONOHYDRATE AND SODIUM CHLORIDE 5; .45 G/100ML; G/100ML
INJECTION, SOLUTION INTRAVENOUS CONTINUOUS
Status: DISCONTINUED | OUTPATIENT
Start: 2020-12-20 | End: 2020-12-21

## 2020-12-20 RX ORDER — INSULIN ASPART 100 [IU]/ML
0-5 INJECTION, SOLUTION INTRAVENOUS; SUBCUTANEOUS EVERY 6 HOURS PRN
Status: DISCONTINUED | OUTPATIENT
Start: 2020-12-20 | End: 2020-01-01 | Stop reason: HOSPADM

## 2020-12-20 RX ADMIN — Medication 16 G: at 06:12

## 2020-12-20 RX ADMIN — DEXTROSE MONOHYDRATE 25 G: 25 INJECTION, SOLUTION INTRAVENOUS at 05:12

## 2020-12-20 RX ADMIN — SUCRALFATE 1 G: 1 TABLET ORAL at 01:12

## 2020-12-20 RX ADMIN — Medication 0.26 MCG/KG/MIN: at 12:12

## 2020-12-20 RX ADMIN — Medication 0.24 MCG/KG/MIN: at 12:12

## 2020-12-20 RX ADMIN — MORPHINE SULFATE 1 MG: 2 INJECTION, SOLUTION INTRAMUSCULAR; INTRAVENOUS at 01:12

## 2020-12-20 RX ADMIN — SUCRALFATE 1 G: 1 TABLET ORAL at 04:12

## 2020-12-20 RX ADMIN — CIPROFLOXACIN 400 MG: 2 INJECTION, SOLUTION INTRAVENOUS at 08:12

## 2020-12-20 RX ADMIN — MORPHINE SULFATE 1 MG: 2 INJECTION, SOLUTION INTRAMUSCULAR; INTRAVENOUS at 07:12

## 2020-12-20 RX ADMIN — SODIUM CHLORIDE: 0.9 INJECTION, SOLUTION INTRAVENOUS at 10:12

## 2020-12-20 RX ADMIN — MONTELUKAST 10 MG: 10 TABLET, FILM COATED ORAL at 09:12

## 2020-12-20 RX ADMIN — PANTOPRAZOLE SODIUM 40 MG: 40 INJECTION, POWDER, FOR SOLUTION INTRAVENOUS at 09:12

## 2020-12-20 RX ADMIN — SUCRALFATE 1 G: 1 TABLET ORAL at 08:12

## 2020-12-20 RX ADMIN — MELATONIN 6 MG: 3 TAB ORAL at 09:12

## 2020-12-20 RX ADMIN — Medication 0.22 MCG/KG/MIN: at 12:12

## 2020-12-20 RX ADMIN — Medication 0.05 MCG/KG/MIN: at 05:12

## 2020-12-20 RX ADMIN — Medication 0.04 MCG/KG/MIN: at 04:12

## 2020-12-20 RX ADMIN — DEXTROSE AND SODIUM CHLORIDE: 5; .45 INJECTION, SOLUTION INTRAVENOUS at 05:12

## 2020-12-20 RX ADMIN — GABAPENTIN 100 MG: 100 CAPSULE ORAL at 08:12

## 2020-12-20 RX ADMIN — PANTOPRAZOLE SODIUM 40 MG: 40 INJECTION, POWDER, FOR SOLUTION INTRAVENOUS at 08:12

## 2020-12-20 RX ADMIN — METRONIDAZOLE 500 MG: 500 INJECTION, SOLUTION INTRAVENOUS at 01:12

## 2020-12-20 RX ADMIN — METRONIDAZOLE 500 MG: 500 INJECTION, SOLUTION INTRAVENOUS at 07:12

## 2020-12-20 RX ADMIN — SUCRALFATE 1 G: 1 TABLET ORAL at 09:12

## 2020-12-20 RX ADMIN — MUPIROCIN: 20 OINTMENT TOPICAL at 08:12

## 2020-12-20 RX ADMIN — CIPROFLOXACIN 400 MG: 2 INJECTION, SOLUTION INTRAVENOUS at 09:12

## 2020-12-20 RX ADMIN — METRONIDAZOLE 500 MG: 500 INJECTION, SOLUTION INTRAVENOUS at 04:12

## 2020-12-20 RX ADMIN — GABAPENTIN 100 MG: 100 CAPSULE ORAL at 09:12

## 2020-12-20 RX ADMIN — MUPIROCIN: 20 OINTMENT TOPICAL at 09:12

## 2020-12-20 NOTE — PROGRESS NOTES
Chief Complaint:        Chief Complaint   Patient presents with    Fatigue       Per EMS, Fly reports a rough night with pt complaining of back pain, and abdominal pain with an episode of diarrhea after a period of constipation. EMS reports patient to be very lethargic and altered. Pt answers questions appropriately at time of triage.    Back Pain    Abdominal Pain         HPI: 79 y/o female with significant medical history of cad, dm2, h/o cva, colitis presented to the ed with complaints of worsening diarrhea and low back pain and further work up suggestive of severe sepsis with colitis and shock with worse renal functions and further admitted to icu on aggressive ivf and pressors and iv abx and symptomatic management    Today's info : madonna nd examined, more alert this am, h and continues to drop, remains on iv abx  Remains on pressor support          Past Medical History:   Diagnosis Date    Blood disorder      Colitis      Coronary artery disease      Diabetes mellitus      GERD (gastroesophageal reflux disease)      Heart disease, unspecified      Memory loss      Stroke                 Past Surgical History:   Procedure Laterality Date    APPENDECTOMY        BILATERAL SALPINGOOPHORECTOMY         SECTION        CHOLECYSTECTOMY        DILATION AND CURETTAGE OF UTERUS        HYSTERECTOMY   age 27     bleeding    KNEE SURGERY        SHOULDER OPEN ROTATOR CUFF REPAIR                   Review of patient's allergies indicates:   Allergen Reactions    Motrin [ibuprofen] Other (See Comments)       Feels like heart is swelling    Penicillins Hives and Itching    Sulfa (sulfonamide antibiotics) Nausea And Vomiting         No current facility-administered medications on file prior to encounter.            Current Outpatient Medications on File Prior to Encounter   Medication Sig    carvediloL (COREG) 3.125 MG tablet Take 3.125 mg by mouth 2 (two) times daily with meals.    clopidogreL  (PLAVIX) 75 mg tablet Take 75 mg by mouth once daily.    gabapentin (NEURONTIN) 100 MG capsule TAKE 1 CAPSULE BY MOUTH TWICE DAILY    montelukast (SINGULAIR) 10 mg tablet Take 10 mg by mouth every evening.    pantoprazole (PROTONIX) 40 MG tablet Take 40 mg by mouth once daily.    paroxetine (PAXIL) 20 MG tablet Take 20 mg by mouth every morning.    sucralfate (CARAFATE) 1 gram tablet Take 1 g by mouth 4 (four) times daily.           Family History      Problem Relation (Age of Onset)     Cancer Maternal Grandmother, Paternal Grandfather     Migraines Mother, Paternal Aunt, Son     Stroke Paternal Aunt                Tobacco Use    Smoking status: Former Smoker       Quit date: 1/21/2005       Years since quitting: 15.9   Substance and Sexual Activity    Alcohol use: No    Drug use: No    Sexual activity: Not Currently       Partners: Male       Birth control/protection: Surgical, Post-menopausal       Comment:       Review of Systems   Constitutional: Negative.    HENT: Negative.    Eyes: Negative.    Respiratory: Negative.    Cardiovascular: Positive for palpitations.   Gastrointestinal: Positive for diarrhea.   Endocrine: Negative.    Genitourinary: Negative.    Musculoskeletal: Positive for back pain.   Skin: Negative.    Allergic/Immunologic: Negative.    Neurological: Negative.    Hematological: Negative.    Psychiatric/Behavioral: The patient is nervous/anxious.       Objective:      Vitals:    12/20/20 1000   BP: (!) 104/58   Pulse: 70   Resp: 10   Temp:        Weight: 85.7 kg (189 lb)  Body mass index is 30.51 kg/m².     Physical Exam  Constitutional:       Appearance: Normal appearance. She is obese.   HENT:      Head: Normocephalic and atraumatic.      Right Ear: Tympanic membrane normal.      Left Ear: Tympanic membrane normal.      Nose: Nose normal.      Mouth/Throat:      Mouth: Mucous membranes are dry.   Eyes:      Pupils: Pupils are equal, round, and reactive to light.   Neck:       Musculoskeletal: Normal range of motion and neck supple.   Cardiovascular:      Rate and Rhythm: Regular rhythm. Tachycardia present.      Pulses: Normal pulses.      Heart sounds: Normal heart sounds.   Pulmonary:      Breath sounds: Normal breath sounds.   Abdominal:      General: Abdomen is flat. Bowel sounds are normal. There is distension.      Palpations: Abdomen is soft.      Tenderness: There is abdominal tenderness.   Musculoskeletal: Normal range of motion.   Skin:     General: Skin is warm and dry.      Capillary Refill: Capillary refill takes less than 2 seconds.   Neurological:      General: No focal deficit present.      Mental Status: She is alert.   Psychiatric:         Mood and Affect: Mood normal.         Behavior: Behavior normal.               CRANIAL NERVES      CN III, IV, VI   Pupils are equal, round, and reactive to light.        Significant Labs: All pertinent labs within the past 24 hours have been reviewed.  Lab Results   Component Value Date    WBC 16.58 (H) 12/20/2020    HGB 7.6 (L) 12/20/2020    HCT 24.7 (L) 12/20/2020    MCV 92 12/20/2020     12/20/2020       Recent Labs   Lab 12/20/20  0445      K 4.6      CO2 21*   BUN 54*   CREATININE 3.6*   CALCIUM 7.6*          Significant Imaging: I have reviewed and interpreted all pertinent imaging results/findings within the past 24 hours.     Assessment/Plan:            Active Diagnoses:     Diagnosis Date Noted POA    PRINCIPAL PROBLEM:  Septic shock [A41.9, R65.21] 12/19/2020 Yes    Colitis [K52.9] 12/19/2020 Yes       Problems Resolved During this Admission:          VTE Risk Mitigation (From admission, onward)                  Ordered        IP VTE HIGH RISK PATIENT  Once      12/19/20 0958        Place CAROLINE hose  Until discontinued      12/19/20 0958                  Severe sepsis  Shock sec to above  Colitis  htn  Cad  H/o cva  Chronic lumbago metabolic acidosis  ros on ckd  Dm 2  uti poa  Metabolic  acidosis  Hyperkalemia  fobt positive - ?ongoing gi bleed     Plan :  ivf  Wean off pressor support as elsa  Iv bicarb  Iv abx  Transfuse 2 units prbc  sandostatin gtt  Follow cx  Renal reccs  Will monitor for overt gi blled  Continue to Hold antiplatelets and anticoag  Continue with cc monitoring  Guarded prognosis  Labs in am  Gi and dvt ppx  Code status full  Cc time 31 min

## 2020-12-20 NOTE — SUBJECTIVE & OBJECTIVE
No current facility-administered medications on file prior to encounter.      Current Outpatient Medications on File Prior to Encounter   Medication Sig    carvediloL (COREG) 3.125 MG tablet Take 3.125 mg by mouth 2 (two) times daily with meals.    clopidogreL (PLAVIX) 75 mg tablet Take 75 mg by mouth once daily.    gabapentin (NEURONTIN) 100 MG capsule TAKE 1 CAPSULE BY MOUTH TWICE DAILY    montelukast (SINGULAIR) 10 mg tablet Take 10 mg by mouth every evening.    pantoprazole (PROTONIX) 40 MG tablet Take 40 mg by mouth once daily.    paroxetine (PAXIL) 20 MG tablet Take 20 mg by mouth every morning.    sucralfate (CARAFATE) 1 gram tablet Take 1 g by mouth 4 (four) times daily.       Review of patient's allergies indicates:   Allergen Reactions    Motrin [ibuprofen] Other (See Comments)     Feels like heart is swelling    Penicillins Hives and Itching    Sulfa (sulfonamide antibiotics) Nausea And Vomiting       Past Medical History:   Diagnosis Date    Blood disorder     Colitis     Coronary artery disease     Diabetes mellitus     GERD (gastroesophageal reflux disease)     Heart disease, unspecified     Memory loss     Stroke      Past Surgical History:   Procedure Laterality Date    APPENDECTOMY      BILATERAL SALPINGOOPHORECTOMY       SECTION      CHOLECYSTECTOMY      DILATION AND CURETTAGE OF UTERUS      HYSTERECTOMY  age 27    bleeding    KNEE SURGERY      SHOULDER OPEN ROTATOR CUFF REPAIR       Family History     Problem Relation (Age of Onset)    Cancer Maternal Grandmother, Paternal Grandfather    Migraines Mother, Paternal Aunt, Son    Stroke Paternal Aunt        Tobacco Use    Smoking status: Former Smoker     Quit date: 2005     Years since quitting: 15.9   Substance and Sexual Activity    Alcohol use: No    Drug use: No    Sexual activity: Not Currently     Partners: Male     Birth control/protection: Surgical, Post-menopausal     Comment:      Review  of Systems   Constitutional: Positive for appetite change and fatigue.   Gastrointestinal: Positive for abdominal pain, diarrhea and nausea. Negative for blood in stool and vomiting.   Musculoskeletal: Positive for back pain.   Neurological: Positive for weakness and light-headedness.   All other systems reviewed and are negative.    Objective:     Vital Signs (Most Recent):  Temp: 97.8 °F (36.6 °C) (12/19/20 1632)  Pulse: 69 (12/19/20 1920)  Resp: 16 (12/19/20 1920)  BP: (!) 102/54 (12/19/20 1920)  SpO2: 100 % (12/19/20 1920) Vital Signs (24h Range):  Temp:  [97.5 °F (36.4 °C)-97.8 °F (36.6 °C)] 97.8 °F (36.6 °C)  Pulse:  [38-74] 69  Resp:  [16-18] 16  SpO2:  [99 %-100 %] 100 %  BP: ()/(41-67) 102/54     Weight: 85.7 kg (189 lb)  Body mass index is 30.51 kg/m².    Physical Exam  Vitals signs and nursing note reviewed.   Constitutional:       Appearance: She is ill-appearing.   HENT:      Head: Normocephalic and atraumatic.      Mouth/Throat:      Mouth: Mucous membranes are dry.      Pharynx: Oropharynx is clear.   Eyes:      Extraocular Movements: Extraocular movements intact.      Pupils: Pupils are equal, round, and reactive to light.   Neck:      Musculoskeletal: Neck supple.   Cardiovascular:      Rate and Rhythm: Normal rate and regular rhythm.      Heart sounds: Normal heart sounds. No murmur. No gallop.    Pulmonary:      Effort: Pulmonary effort is normal. No respiratory distress.      Breath sounds: Normal breath sounds. No stridor. No wheezing or rales.   Abdominal:      General: Bowel sounds are normal. There is no distension.      Palpations: Abdomen is soft.      Tenderness: There is abdominal tenderness (suprapubic and LLQ).   Musculoskeletal: Normal range of motion.         General: No swelling.   Skin:     General: Skin is warm and dry.      Coloration: Skin is pale.   Neurological:      General: No focal deficit present.      Mental Status: She is alert. Mental status is at baseline. She is  disoriented.      Motor: Weakness present.   Psychiatric:         Mood and Affect: Mood normal.      Comments: Seems confused while history taking         Significant Labs:  CBC:   Recent Labs   Lab 12/19/20 0725 12/19/20  1833   WBC 31.01*  --    RBC 3.80*  --    HGB 10.8* 8.5*   HCT 34.5* 27.6*   *  --    MCV 91  --    MCH 28.4  --    MCHC 31.3*  --      BMP:   Recent Labs   Lab 12/19/20  0725 12/19/20  1124   * 97   * 135*   K 6.4* 5.9*    108   CO2 20* 19*   BUN 57* 54*   CREATININE 4.2* 3.9*   CALCIUM 9.3 8.9   MG 3.5*  --      CMP:   Recent Labs   Lab 12/19/20 0725 12/19/20  1124   * 97   CALCIUM 9.3 8.9   ALBUMIN 3.0*  --    PROT 7.2  --    * 135*   K 6.4* 5.9*   CO2 20* 19*    108   BUN 57* 54*   CREATININE 4.2* 3.9*   ALKPHOS 398*  --    ALT 22  --    AST 37  --    BILITOT 0.5  --      Coagulation:   Recent Labs   Lab 12/19/20  0725   LABPROT 10.2   INR 0.8   APTT 64.6*     ABGs:   Recent Labs   Lab 12/19/20  0743   PH 7.349   PCO2 29.9*   PO2 79.0*   HCO3 17.8   POCSATURATED 93.5       Significant Diagnostics:  CT: I have reviewed all pertinent results/findings within the past 24 hours and my personal findings are:  limited by noncontrast but left sided colitis

## 2020-12-20 NOTE — CONSULTS
[facility] - Emergency Department  General Surgery  Consult Note    Patient Name: Smiley Harmon  MRN: [id number]  Code Status: Full Code  Admission Date: 12/19/2020  Hospital Length of Stay: 0 days  Attending Physician: No att. providers found  Primary Care Provider: Yani Carlos MD    Patient information was obtained from patient, past medical records and ER records.     Inpatient consult to General Surgery  Consult performed by: Leatha Dominguez MD  Consult ordered by: Mateo Mendez MD  Reason for consult: colitis        Subjective:     Principal Problem: Septic shock    History of Present Illness: Brought to ER for diarrhea and decreased responsiveness.   Noted to have colitis by CT and elevated WBC.  Pt confused but does report nonbloody diarrhea for a few days and nausea.  Decreased appetite.      No current facility-administered medications on file prior to encounter.      Current Outpatient Medications on File Prior to Encounter   Medication Sig    carvediloL (COREG) 3.125 MG tablet Take 3.125 mg by mouth 2 (two) times daily with meals.    clopidogreL (PLAVIX) 75 mg tablet Take 75 mg by mouth once daily.    gabapentin (NEURONTIN) 100 MG capsule TAKE 1 CAPSULE BY MOUTH TWICE DAILY    montelukast (SINGULAIR) 10 mg tablet Take 10 mg by mouth every evening.    pantoprazole (PROTONIX) 40 MG tablet Take 40 mg by mouth once daily.    paroxetine (PAXIL) 20 MG tablet Take 20 mg by mouth every morning.    sucralfate (CARAFATE) 1 gram tablet Take 1 g by mouth 4 (four) times daily.       Review of patient's allergies indicates:   Allergen Reactions    Motrin [ibuprofen] Other (See Comments)     Feels like heart is swelling    Penicillins Hives and Itching    Sulfa (sulfonamide antibiotics) Nausea And Vomiting       Past Medical History:   Diagnosis Date    Blood disorder     Colitis     Coronary artery disease     Diabetes mellitus     GERD (gastroesophageal reflux disease)     Heart  disease, unspecified     Memory loss     Stroke      Past Surgical History:   Procedure Laterality Date    APPENDECTOMY      BILATERAL SALPINGOOPHORECTOMY       SECTION      CHOLECYSTECTOMY      DILATION AND CURETTAGE OF UTERUS      HYSTERECTOMY  age 27    bleeding    KNEE SURGERY      SHOULDER OPEN ROTATOR CUFF REPAIR       Family History     Problem Relation (Age of Onset)    Cancer Maternal Grandmother, Paternal Grandfather    Migraines Mother, Paternal Aunt, Son    Stroke Paternal Aunt        Tobacco Use    Smoking status: Former Smoker     Quit date: 2005     Years since quitting: 15.9   Substance and Sexual Activity    Alcohol use: No    Drug use: No    Sexual activity: Not Currently     Partners: Male     Birth control/protection: Surgical, Post-menopausal     Comment:      Review of Systems   Constitutional: Positive for appetite change and fatigue.   Gastrointestinal: Positive for abdominal pain, diarrhea and nausea. Negative for blood in stool and vomiting.   Musculoskeletal: Positive for back pain.   Neurological: Positive for weakness and light-headedness.   All other systems reviewed and are negative.    Objective:     Vital Signs (Most Recent):  Temp: 97.8 °F (36.6 °C) (20 1632)  Pulse: 69 (20)  Resp: 16 (20)  BP: (!) 102/54 (20)  SpO2: 100 % (20) Vital Signs (24h Range):  Temp:  [97.5 °F (36.4 °C)-97.8 °F (36.6 °C)] 97.8 °F (36.6 °C)  Pulse:  [38-74] 69  Resp:  [16-18] 16  SpO2:  [99 %-100 %] 100 %  BP: ()/(41-67) 102/54     Weight: 85.7 kg (189 lb)  Body mass index is 30.51 kg/m².    Physical Exam  Vitals signs and nursing note reviewed.   Constitutional:       Appearance: She is ill-appearing.   HENT:      Head: Normocephalic and atraumatic.      Mouth/Throat:      Mouth: Mucous membranes are dry.      Pharynx: Oropharynx is clear.   Eyes:      Extraocular Movements: Extraocular movements intact.      Pupils:  Pupils are equal, round, and reactive to light.   Neck:      Musculoskeletal: Neck supple.   Cardiovascular:      Rate and Rhythm: Normal rate and regular rhythm.      Heart sounds: Normal heart sounds. No murmur. No gallop.    Pulmonary:      Effort: Pulmonary effort is normal. No respiratory distress.      Breath sounds: Normal breath sounds. No stridor. No wheezing or rales.   Abdominal:      General: Bowel sounds are normal. There is no distension.      Palpations: Abdomen is soft.      Tenderness: There is abdominal tenderness (suprapubic and LLQ).   Musculoskeletal: Normal range of motion.         General: No swelling.   Skin:     General: Skin is warm and dry.      Coloration: Skin is pale.   Neurological:      General: No focal deficit present.      Mental Status: She is alert. Mental status is at baseline. She is disoriented.      Motor: Weakness present.   Psychiatric:         Mood and Affect: Mood normal.      Comments: Seems confused while history taking         Significant Labs:  CBC:   Recent Labs   Lab 12/19/20 0725 12/19/20  1833   WBC 31.01*  --    RBC 3.80*  --    HGB 10.8* 8.5*   HCT 34.5* 27.6*   *  --    MCV 91  --    MCH 28.4  --    MCHC 31.3*  --      BMP:   Recent Labs   Lab 12/19/20 0725 12/19/20  1124   * 97   * 135*   K 6.4* 5.9*    108   CO2 20* 19*   BUN 57* 54*   CREATININE 4.2* 3.9*   CALCIUM 9.3 8.9   MG 3.5*  --      CMP:   Recent Labs   Lab 12/19/20 0725 12/19/20  1124   * 97   CALCIUM 9.3 8.9   ALBUMIN 3.0*  --    PROT 7.2  --    * 135*   K 6.4* 5.9*   CO2 20* 19*    108   BUN 57* 54*   CREATININE 4.2* 3.9*   ALKPHOS 398*  --    ALT 22  --    AST 37  --    BILITOT 0.5  --      Coagulation:   Recent Labs   Lab 12/19/20 0725   LABPROT 10.2   INR 0.8   APTT 64.6*     ABGs:   Recent Labs   Lab 12/19/20  0743   PH 7.349   PCO2 29.9*   PO2 79.0*   HCO3 17.8   POCSATURATED 93.5       Significant Diagnostics:  CT: I have reviewed all  pertinent results/findings within the past 24 hours and my personal findings are:  limited by noncontrast but left sided colitis    Assessment/Plan:     * Septic shock  Continue fluid resuscitation.  Secondary to UTI and colitis with severe dehydration    UTI (urinary tract infection)  Antibiotics per primary  Needs stephens for UOP monitoring    Acute renal failure  Continue resuscitiation  Agree with nephrology consult      Colitis  Antibiotics  Check stool for c diff  NPO      VTE Risk Mitigation (From admission, onward)         Ordered     IP VTE HIGH RISK PATIENT  Once      12/19/20 0958     Place CAROLINE hose  Until discontinued      12/19/20 0958                Thank you for your consult. I will follow-up with patient. Please contact us if you have any additional questions.    Leatha Dominguez MD  General Surgery  [facility] - Emergency Department

## 2020-12-20 NOTE — PLAN OF CARE
Pt has had numerous maroon bm's this shift but bowel mvmts are smaller and less dark.  B/P also stabalizing.

## 2020-12-21 PROBLEM — E11.9 DIABETES MELLITUS, TYPE 2: Status: ACTIVE | Noted: 2020-12-21

## 2020-12-21 PROBLEM — E16.2 HYPOGLYCEMIA: Status: ACTIVE | Noted: 2020-12-21

## 2020-12-21 LAB
ALBUMIN SERPL BCP-MCNC: 2.3 G/DL (ref 3.5–5.2)
ALP SERPL-CCNC: 126 U/L (ref 55–135)
ALT SERPL W/O P-5'-P-CCNC: 9 U/L (ref 10–44)
ANION GAP SERPL CALC-SCNC: 7 MMOL/L (ref 8–16)
AST SERPL-CCNC: 18 U/L (ref 10–40)
BASOPHILS # BLD AUTO: 0.1 K/UL (ref 0–0.2)
BASOPHILS NFR BLD: 0.9 % (ref 0–1.9)
BILIRUB SERPL-MCNC: 0.4 MG/DL (ref 0.1–1)
BUN SERPL-MCNC: 49 MG/DL (ref 8–23)
CALCIUM SERPL-MCNC: 7 MG/DL (ref 8.7–10.5)
CHLORIDE SERPL-SCNC: 112 MMOL/L (ref 95–110)
CO2 SERPL-SCNC: 19 MMOL/L (ref 23–29)
CREAT SERPL-MCNC: 3.2 MG/DL (ref 0.5–1.4)
DIFFERENTIAL METHOD: ABNORMAL
EOSINOPHIL # BLD AUTO: 0.5 K/UL (ref 0–0.5)
EOSINOPHIL NFR BLD: 4.1 % (ref 0–8)
ERYTHROCYTE [DISTWIDTH] IN BLOOD BY AUTOMATED COUNT: 18.8 % (ref 11.5–14.5)
EST. GFR  (AFRICAN AMERICAN): 15.3 ML/MIN/1.73 M^2
EST. GFR  (NON AFRICAN AMERICAN): 13.3 ML/MIN/1.73 M^2
GLUCOSE SERPL-MCNC: 111 MG/DL (ref 70–110)
GLUCOSE SERPL-MCNC: 98 MG/DL (ref 70–110)
HCT VFR BLD AUTO: 26.5 % (ref 37–48.5)
HGB BLD-MCNC: 8.3 G/DL (ref 12–16)
IMM GRANULOCYTES # BLD AUTO: 0.04 K/UL (ref 0–0.04)
IMM GRANULOCYTES NFR BLD AUTO: 0.4 % (ref 0–0.5)
LYMPHOCYTES # BLD AUTO: 1.5 K/UL (ref 1–4.8)
LYMPHOCYTES NFR BLD: 13 % (ref 18–48)
MCH RBC QN AUTO: 27.4 PG (ref 27–31)
MCHC RBC AUTO-ENTMCNC: 31.3 G/DL (ref 32–36)
MCV RBC AUTO: 88 FL (ref 82–98)
MONOCYTES # BLD AUTO: 0.6 K/UL (ref 0.3–1)
MONOCYTES NFR BLD: 5.4 % (ref 4–15)
NEUTROPHILS # BLD AUTO: 8.6 K/UL (ref 1.8–7.7)
NEUTROPHILS NFR BLD: 76.2 % (ref 38–73)
NRBC BLD-RTO: 0 /100 WBC
PLATELET # BLD AUTO: 201 K/UL (ref 150–350)
PMV BLD AUTO: 10.7 FL (ref 9.2–12.9)
POCT GLUCOSE: 106 MG/DL (ref 70–110)
POCT GLUCOSE: 25 MG/DL (ref 70–110)
POCT GLUCOSE: 27 MG/DL (ref 70–110)
POCT GLUCOSE: 44 MG/DL (ref 70–110)
POCT GLUCOSE: 59 MG/DL (ref 70–110)
POCT GLUCOSE: 60 MG/DL (ref 70–110)
POCT GLUCOSE: 83 MG/DL (ref 70–110)
POCT GLUCOSE: 83 MG/DL (ref 70–110)
POCT GLUCOSE: <20 MG/DL (ref 70–110)
POCT GLUCOSE: <20 MG/DL (ref 70–110)
POTASSIUM SERPL-SCNC: 4.1 MMOL/L (ref 3.5–5.1)
PROT SERPL-MCNC: 5.1 G/DL (ref 6–8.4)
RBC # BLD AUTO: 3.03 M/UL (ref 4–5.4)
SODIUM SERPL-SCNC: 138 MMOL/L (ref 136–145)
WBC # BLD AUTO: 11.32 K/UL (ref 3.9–12.7)

## 2020-12-21 PROCEDURE — S5010 5% DEXTROSE AND 0.45% SALINE: HCPCS | Performed by: INTERNAL MEDICINE

## 2020-12-21 PROCEDURE — S0030 INJECTION, METRONIDAZOLE: HCPCS | Performed by: EMERGENCY MEDICINE

## 2020-12-21 PROCEDURE — 25000003 PHARM REV CODE 250: Performed by: EMERGENCY MEDICINE

## 2020-12-21 PROCEDURE — 25000003 PHARM REV CODE 250: Performed by: INTERNAL MEDICINE

## 2020-12-21 PROCEDURE — 99900035 HC TECH TIME PER 15 MIN (STAT)

## 2020-12-21 PROCEDURE — 36415 COLL VENOUS BLD VENIPUNCTURE: CPT

## 2020-12-21 PROCEDURE — 63600175 PHARM REV CODE 636 W HCPCS: Performed by: INTERNAL MEDICINE

## 2020-12-21 PROCEDURE — 82947 ASSAY GLUCOSE BLOOD QUANT: CPT

## 2020-12-21 PROCEDURE — 94761 N-INVAS EAR/PLS OXIMETRY MLT: CPT

## 2020-12-21 PROCEDURE — 20000000 HC ICU ROOM

## 2020-12-21 PROCEDURE — C9113 INJ PANTOPRAZOLE SODIUM, VIA: HCPCS | Performed by: INTERNAL MEDICINE

## 2020-12-21 PROCEDURE — 80053 COMPREHEN METABOLIC PANEL: CPT

## 2020-12-21 PROCEDURE — 85025 COMPLETE CBC W/AUTO DIFF WBC: CPT

## 2020-12-21 RX ORDER — DEXTROSE MONOHYDRATE 50 MG/ML
INJECTION, SOLUTION INTRAVENOUS CONTINUOUS
Status: DISCONTINUED | OUTPATIENT
Start: 2020-12-21 | End: 2020-12-21

## 2020-12-21 RX ORDER — CIPROFLOXACIN 2 MG/ML
400 INJECTION, SOLUTION INTRAVENOUS
Status: DISCONTINUED | OUTPATIENT
Start: 2020-12-21 | End: 2020-12-24

## 2020-12-21 RX ADMIN — SUCRALFATE 1 G: 1 TABLET ORAL at 08:12

## 2020-12-21 RX ADMIN — Medication 16 G: at 05:12

## 2020-12-21 RX ADMIN — DEXTROSE: 5 SOLUTION INTRAVENOUS at 08:12

## 2020-12-21 RX ADMIN — SUCRALFATE 1 G: 1 TABLET ORAL at 01:12

## 2020-12-21 RX ADMIN — GABAPENTIN 100 MG: 100 CAPSULE ORAL at 08:12

## 2020-12-21 RX ADMIN — MORPHINE SULFATE 1 MG: 2 INJECTION, SOLUTION INTRAMUSCULAR; INTRAVENOUS at 05:12

## 2020-12-21 RX ADMIN — METRONIDAZOLE 500 MG: 500 INJECTION, SOLUTION INTRAVENOUS at 08:12

## 2020-12-21 RX ADMIN — DEXTROSE MONOHYDRATE 25 G: 25 INJECTION, SOLUTION INTRAVENOUS at 06:12

## 2020-12-21 RX ADMIN — Medication 16 G: at 07:12

## 2020-12-21 RX ADMIN — MORPHINE SULFATE 1 MG: 2 INJECTION, SOLUTION INTRAMUSCULAR; INTRAVENOUS at 08:12

## 2020-12-21 RX ADMIN — Medication 24 G: at 11:12

## 2020-12-21 RX ADMIN — MORPHINE SULFATE 1 MG: 2 INJECTION, SOLUTION INTRAMUSCULAR; INTRAVENOUS at 04:12

## 2020-12-21 RX ADMIN — DEXTROSE: 5 SOLUTION INTRAVENOUS at 09:12

## 2020-12-21 RX ADMIN — MONTELUKAST 10 MG: 10 TABLET, FILM COATED ORAL at 08:12

## 2020-12-21 RX ADMIN — MORPHINE SULFATE 1 MG: 2 INJECTION, SOLUTION INTRAMUSCULAR; INTRAVENOUS at 01:12

## 2020-12-21 RX ADMIN — MELATONIN 6 MG: 3 TAB ORAL at 08:12

## 2020-12-21 RX ADMIN — MUPIROCIN: 20 OINTMENT TOPICAL at 08:12

## 2020-12-21 RX ADMIN — SUCRALFATE 1 G: 1 TABLET ORAL at 04:12

## 2020-12-21 RX ADMIN — METRONIDAZOLE 500 MG: 500 INJECTION, SOLUTION INTRAVENOUS at 12:12

## 2020-12-21 RX ADMIN — DEXTROSE AND SODIUM CHLORIDE 100 ML/HR: 5; .45 INJECTION, SOLUTION INTRAVENOUS at 04:12

## 2020-12-21 RX ADMIN — CIPROFLOXACIN 400 MG: 2 INJECTION, SOLUTION INTRAVENOUS at 09:12

## 2020-12-21 RX ADMIN — PANTOPRAZOLE SODIUM 40 MG: 40 INJECTION, POWDER, FOR SOLUTION INTRAVENOUS at 08:12

## 2020-12-21 RX ADMIN — SODIUM BICARBONATE: 84 INJECTION, SOLUTION INTRAVENOUS at 02:12

## 2020-12-21 RX ADMIN — MORPHINE SULFATE 1 MG: 2 INJECTION, SOLUTION INTRAMUSCULAR; INTRAVENOUS at 09:12

## 2020-12-21 RX ADMIN — METRONIDAZOLE 500 MG: 500 INJECTION, SOLUTION INTRAVENOUS at 04:12

## 2020-12-21 NOTE — SUBJECTIVE & OBJECTIVE
Interval History: Episode of hypoglycemia.  Otherwise feeling better.    Review of Systems   Constitutional: Positive for appetite change and fatigue.   Gastrointestinal: Positive for abdominal pain, diarrhea and nausea. Negative for blood in stool and vomiting.   Musculoskeletal: Positive for back pain.   Neurological: Positive for weakness and light-headedness.   Psychiatric/Behavioral: Positive for sleep disturbance. Negative for agitation.   All other systems reviewed and are negative.    Objective:     Vital Signs (Most Recent):  Temp: 97.8 °F (36.6 °C) (12/21/20 0745)  Pulse: 70 (12/21/20 0745)  Resp: 20 (12/21/20 0745)  BP: 96/70 (12/21/20 0745)  SpO2: 100 % (12/21/20 0745) Vital Signs (24h Range):  Temp:  [96 °F (35.6 °C)-98.3 °F (36.8 °C)] 97.8 °F (36.6 °C)  Pulse:  [60-78] 70  Resp:  [10-29] 20  SpO2:  [100 %] 100 %  BP: ()/(51-70) 96/70     Weight: 88.5 kg (195 lb 1.7 oz)  Body mass index is 31.49 kg/m².    Intake/Output Summary (Last 24 hours) at 12/21/2020 0853  Last data filed at 12/21/2020 0600  Gross per 24 hour   Intake 3806.83 ml   Output --   Net 3806.83 ml      Physical Exam  Vitals signs and nursing note reviewed.   Constitutional:       General: She is in acute distress.      Appearance: She is obese. She is ill-appearing.   HENT:      Head: Normocephalic and atraumatic.      Mouth/Throat:      Mouth: Mucous membranes are dry.      Pharynx: Oropharynx is clear.   Eyes:      Extraocular Movements: Extraocular movements intact.      Pupils: Pupils are equal, round, and reactive to light.   Neck:      Musculoskeletal: Neck supple.   Cardiovascular:      Rate and Rhythm: Normal rate and regular rhythm.      Heart sounds: Normal heart sounds. No murmur. No gallop.    Pulmonary:      Effort: Pulmonary effort is normal. No respiratory distress.      Breath sounds: Normal breath sounds. No stridor. No wheezing or rales.   Abdominal:      General: Bowel sounds are normal. There is no distension.       Palpations: Abdomen is soft.      Tenderness: There is abdominal tenderness (suprapubic and LLQ).   Musculoskeletal: Normal range of motion.         General: No swelling.   Skin:     General: Skin is warm and dry.      Coloration: Skin is pale.   Neurological:      General: No focal deficit present.      Mental Status: She is alert. Mental status is at baseline. She is disoriented.      Motor: Weakness present.   Psychiatric:         Mood and Affect: Mood normal.         Significant Labs: All pertinent labs within the past 24 hours have been reviewed.    Significant Imaging: I have reviewed and interpreted all pertinent imaging results/findings within the past 24 hours.

## 2020-12-21 NOTE — PROGRESS NOTES
Pharmacist Renal Dose Adjustment Note    Smiley Harmon is a 80 y.o. female being treated with the medication ciprofloxacin    Patient Data:    Vital Signs (Most Recent):  Temp: 98.3 °F (36.8 °C) (12/21/20 0000)  Pulse: 67 (12/21/20 0145)  Resp: 14 (12/21/20 0417)  BP: (!) 109/55 (12/21/20 0145)  SpO2: 100 % (12/21/20 0145)   Vital Signs (72h Range):  Temp:  [96 °F (35.6 °C)-98.7 °F (37.1 °C)]   Pulse:  [38-78]   Resp:  [10-29]   BP: ()/(41-69)   SpO2:  [98 %-100 %]      Recent Labs   Lab 12/19/20  1124 12/20/20  0445 12/21/20  0410   CREATININE 3.9* 3.6* 3.2*     Serum creatinine: 3.2 mg/dL (H) 12/21/20 0410  Estimated creatinine clearance: 16.2 mL/min (A)    Medication:ciprofloxacin dose: 400 mg frequency Q12h will be changed to medication:ciprofloxacin dose:400 mg  frequency:Q24h    Pharmacist's Name: DELORES MURILLO  Pharmacist's Extension: [phone]

## 2020-12-21 NOTE — PLAN OF CARE
12/21/20 1111   Discharge Assessment   Assessment Type Discharge Planning Assessment   Confirmed/corrected address and phone number on facesheet? Yes   Assessment information obtained from? Patient   Communicated expected length of stay with patient/caregiver no   Prior to hospitilization cognitive status: Alert/Oriented   Prior to hospitalization functional status: Wheelchair Bound;Partially Dependent;Needs Assistance   Current cognitive status: Alert/Oriented   Current Functional Status: Needs Assistance;Partially Dependent;Wheelchair Bound   Lives With child(shawna), adult   Able to Return to Prior Arrangements yes   Is patient able to care for self after discharge? No   Who are your caregiver(s) and their phone number(s)? Kalpesh Thompson   Patient's perception of discharge disposition other (comments)  (home with son taking care of her.)   Readmission Within the Last 30 Days no previous admission in last 30 days   Patient currently being followed by outpatient case management? No   Patient currently receives any other outside agency services? No   Equipment Currently Used at Home shower chair;wheelchair;nebulizer;walker, rolling;bedside commode   Do you have any problems affording any of your prescribed medications? No   Is the patient taking medications as prescribed?   (not known)   Does the patient have transportation home? Yes   Transportation Anticipated family or friend will provide   Does the patient receive services at the [facility]? No   Discharge Plan A Home with family   Discharge Plan B Skilled Nursing Facility   DME Needed Upon Discharge  none   Patient/Family in Agreement with Plan unable to assess   SPOKE WITH PATIENT AT BEDSIDE, SHE CONFIRMS HER CONTACT INFORMATION, STATING HER SON KALPESH THOMPSON LIVES WITH HER.  [phone].  STATES THAT SHE IS MOSTLY BED BOUND TO WHEEL CHAIR AT HOME, CANNOT GET OUT OF BED WITHOUT ASSISTANCE.  STATES NEIGHBOR COMES EVERY OTHER DAY TO BATHE HER.  SELDOM  GETS UP TO BEDSIDE COMMODE, STATES TOO DIFFICULT SINCE STROKE, WEAKNESS ON L SIDE.  USES INCONTINENCE PULL UPS.  USES [facility], STATES HER SON DISPENSES HER MEDICATIONS TO HER.  AT THIS TIME IT IS HER PERCEPTION SHE WILL BE RETURNING HOME AT DISCHARGE AND THAT HER SON WILL CONTINUE TO CARE FOR HER.  SHE HAS BSC, WHEELCHAIR, NEBULIZER, WALKER AT HOME.  SHE STATES HER SON AND NEIGHBOR TAKE TURNS COOKING FOR HER, FEEDS SELF.

## 2020-12-21 NOTE — ASSESSMENT & PLAN NOTE
JOSE A- improving creat.- cont IV fluids and monitor labs.  Will follow intake/ urine output.  Cont Abx. Coverage for UTI with Cipro.  Will change IVF lfuids to 1/2 NS with sodium bicarbonate to correct metabolic acidosis.

## 2020-12-21 NOTE — HPI
"Fatigue        Per EMS, Fly reports a rough night with pt complaining of back pain, and abdominal pain with an episode of diarrhea after a period of constipation. EMS reports patient to be very lethargic and altered. Pt answers questions appropriately at time of triage.    Back Pain    Abdominal Pain      This is a 80-year-old white female history of chronic pain, colitis, diabetes mellitus, presents to the emergency department with back pain and diarrhea after constipation.  EMS reports blood pressure was low upon arrival to the patient's house, but his increased since starting fluids    Pt. Seen in ICU- reports diarrhea is better buyt has been going on for " a while" according to pt.  She denies any N/V or diarrhea @ present, also states she has had recent UTI.    Pt. Stable overnight- nurse reports + C. Diff infection.  Pt. Without c/o and states she is tolerating oral intake.  "

## 2020-12-21 NOTE — CARE UPDATE
Patients blood sugar 44 on glucometer AAOx4. Glucose tablets given x 6. Called to notify  - no answer. Will recheck and continue to monitor.

## 2020-12-21 NOTE — SUBJECTIVE & OBJECTIVE
Past Medical History:   Diagnosis Date    Blood disorder     Colitis     Coronary artery disease     Diabetes mellitus     GERD (gastroesophageal reflux disease)     Heart disease, unspecified     Memory loss     Stroke        Past Surgical History:   Procedure Laterality Date    APPENDECTOMY      BILATERAL SALPINGOOPHORECTOMY       SECTION      CHOLECYSTECTOMY      DILATION AND CURETTAGE OF UTERUS      HYSTERECTOMY  age 27    bleeding    KNEE SURGERY      SHOULDER OPEN ROTATOR CUFF REPAIR         Review of patient's allergies indicates:   Allergen Reactions    Motrin [ibuprofen] Other (See Comments)     Feels like heart is swelling    Penicillins Hives and Itching    Sulfa (sulfonamide antibiotics) Nausea And Vomiting     Current Facility-Administered Medications   Medication Frequency    0.9%  NaCl infusion (for blood administration) Q24H PRN    ciprofloxacin (CIPRO)400mg/200ml D5W IVPB 400 mg Q24H    dextrose 5 % infusion Continuous    dextrose 50% injection 12.5 g PRN    dextrose 50% injection 25 g PRN    gabapentin capsule 100 mg BID    glucagon (human recombinant) injection 1 mg PRN    glucose chewable tablet 16 g PRN    glucose chewable tablet 24 g PRN    insulin aspart U-100 pen 0-5 Units Q6H PRN    melatonin tablet 6 mg Nightly PRN    metronidazole IVPB 500 mg Q8H    montelukast tablet 10 mg QHS    morphine injection 1 mg Q4H PRN    mupirocin 2 % ointment BID    norepinephrine 4 mg in dextrose 5% 250 mL infusion (premix) (titrating) Continuous    ondansetron injection 4 mg Q8H PRN    pantoprazole injection 40 mg BID    sodium chloride 0.9% flush 10 mL PRN    sucralfate tablet 1 g QID     Family History     Problem Relation (Age of Onset)    Cancer Maternal Grandmother, Paternal Grandfather    Migraines Mother, Paternal Aunt, Son    Stroke Paternal Aunt        Tobacco Use    Smoking status: Former Smoker     Quit date: 2005     Years since quitting: 15.9    Substance and Sexual Activity    Alcohol use: No    Drug use: No    Sexual activity: Not Currently     Partners: Male     Birth control/protection: Surgical, Post-menopausal     Comment:      Review of Systems   Constitutional: Positive for activity change, appetite change and fatigue.   Respiratory: Positive for cough and shortness of breath.    Gastrointestinal: Positive for diarrhea.   Genitourinary: Positive for decreased urine volume and urgency.   Musculoskeletal: Positive for arthralgias.   Neurological: Positive for light-headedness.   All other systems reviewed and are negative.    Objective:     Vital Signs (Most Recent):  Temp: 97.7 °F (36.5 °C) (12/21/20 1130)  Pulse: 69 (12/21/20 1130)  Resp: 12 (12/21/20 1130)  BP: (!) 90/53 (12/21/20 1130)  SpO2: 100 % (12/21/20 1130)  O2 Device (Oxygen Therapy): room air (12/21/20 1125) Vital Signs (24h Range):  Temp:  [96 °F (35.6 °C)-98.3 °F (36.8 °C)] 97.7 °F (36.5 °C)  Pulse:  [60-78] 69  Resp:  [10-29] 12  SpO2:  [100 %] 100 %  BP: ()/(51-70) 90/53     Weight: 88.5 kg (195 lb 1.7 oz) (12/21/20 0600)  Body mass index is 31.49 kg/m².  Body surface area is 2.03 meters squared.    I/O last 3 completed shifts:  In: 3806.8 [P.O.:400; I.V.:1635.7; Blood:1071.1; IV Piggyback:700]  Out: -     Physical Exam  Constitutional:       General: She is not in acute distress.     Appearance: Normal appearance. She is obese. She is ill-appearing.   HENT:      Head: Normocephalic and atraumatic.   Cardiovascular:      Rate and Rhythm: Normal rate and regular rhythm.      Pulses: Normal pulses.      Heart sounds: Normal heart sounds.   Pulmonary:      Effort: Pulmonary effort is normal.      Breath sounds: Wheezing present. No rales.   Abdominal:      General: Bowel sounds are normal.      Palpations: Abdomen is soft.   Skin:     General: Skin is warm and dry.   Neurological:      General: No focal deficit present.      Mental Status: She is alert. Mental status  is at baseline.   Psychiatric:         Mood and Affect: Mood normal.         Behavior: Behavior normal.         Thought Content: Thought content normal.         Significant Labs:  Cardiac Markers: No results for input(s): CKMB, TROPONINT, MYOGLOBIN in the last 168 hours.  CBC:   Recent Labs   Lab 12/21/20 0410   WBC 11.32   RBC 3.03*   HGB 8.3*   HCT 26.5*      MCV 88   MCH 27.4   MCHC 31.3*     CMP:   Recent Labs   Lab 12/21/20 0410   GLU 98   CALCIUM 7.0*   ALBUMIN 2.3*   PROT 5.1*      K 4.1   CO2 19*   *   BUN 49*   CREATININE 3.2*   ALKPHOS 126   ALT 9*   AST 18   BILITOT 0.4     Microbiology Results (last 7 days)     Procedure Component Value Units Date/Time    Urine culture [[id number]]  (Abnormal) Collected: 12/19/20 0748    Order Status: Completed Specimen: Urine Updated: 12/21/20 0910     Urine Culture, Routine GRAM NEGATIVE RAVI  >100,000 cfu/ml  Identification and susceptibility pending      Narrative:      Specimen Source->Urine    Blood culture #1 **CANNOT BE ORDERED STAT** [[id number]] Collected: 12/19/20 0700    Order Status: Completed Specimen: Blood Updated: 12/20/20 2212     Blood Culture, Routine No Growth to date      No Growth to date    Blood culture #2 **CANNOT BE ORDERED STAT** [[id number]] Collected: 12/19/20 0719    Order Status: Completed Specimen: Blood Updated: 12/20/20 2212     Blood Culture, Routine No Growth to date      No Growth to date        Recent Labs   Lab 12/19/20 0748   COLORU Yellow   SPECGRAV 1.010   PHUR 6.0   PROTEINUA 1+*   BACTERIA Many*   NITRITE Negative   LEUKOCYTESUR 3+*   UROBILINOGEN Negative   HYALINECASTS 0       Significant Imaging:  Labs: Reviewed  X-Ray: Reviewed  US: Reviewed  CT: Reviewed

## 2020-12-21 NOTE — ASSESSMENT & PLAN NOTE
This is secondary to colitis.  Current treatment includes metronidazole and ciprofloxacin.  Overall symptomatic improvement noted.    We will wean Levophed as tolerated.

## 2020-12-21 NOTE — CARE UPDATE
Patient had moderate marroon liquid bowel movement. Diaper and incontinence pad changed and perineum care done. Patient back on levophed at 0.04 due to decrease in blood pressure.

## 2020-12-21 NOTE — HOSPITAL COURSE
12/21/2020:  Patient states that overall she feels slightly better.  She has experienced an episode of hypoglycemia.  I plan to transition the patient's fluid to D5W.  She has been given an amp of bicarb.  We will continue close ICU monitoring.  She is requiring very minimal levo fed.  Will discontinue once mean arterial pressures remained stable.    12/22/2020:  No acute events overnight.  Patient endorses improvement in abdominal pain.  Now off pressors.  Still having episodes of hypoglycemia.  Continue close ICU monitoring.      12/23/2020: No acute events overnight.  Patient is more anemic this morning and still having some bloody bowel movements.  Will provide 2 units packed red blood cells and continue inpatient monitoring.  Her hypoglycemia appears to be improving.    12/28/2020: No acute events overnight.  Patient still remains weak.  I encouraged her to work with physical and occupational therapy as she is too weak to go home at present.  We discussed the potential option in the future for skilled nursing versus inpatient rehab when she is medically cleared.    12/29/2020: No acute events overnight.  Patient having some mild improvements with her weakness with physical and Occupational Therapy.  The patient is amenable to inpatient rehab after further discussions this morning.  She still have some mild abdominal pain has improved with treatment of her underlying c diff colitis.    12/30/20/20: Patient has been participating with physical therapy.  She will need to complete a course of therapy for C. Difficile colitis.  We will transition to inpatient rehab at this time.

## 2020-12-21 NOTE — PLAN OF CARE
Patient had previously affirmed phone number for her son to .  Attempted to call patient's son Arie Harmon who is listed as main contact.  Phone number is not correct, ICU nurse speaks with patient again to get correct number, and patient does not remember number.  Instructed ICU nurses if anyone calls to inquire about patient, please get contact information.

## 2020-12-21 NOTE — SUBJECTIVE & OBJECTIVE
Interval History: Reports still feeling weak.  Reports chronic right sided abdominal pain.  Nursing reports maroon colored stools last night.  None this AM.      Medications:  Continuous Infusions:   dextrose 5 % 100 mL/hr at 12/21/20 0959    norepinephrine bitartrate-D5W Stopped (12/21/20 0845)     Scheduled Meds:   ciprofloxacin (CIPRO)400mg/200ml D5W IVPB  400 mg Intravenous Q24H    gabapentin  100 mg Oral BID    metronidazole  500 mg Intravenous Q8H    montelukast  10 mg Oral QHS    mupirocin   Nasal BID    pantoprazole  40 mg Intravenous BID    sucralfate  1 g Oral QID     PRN Meds:sodium chloride, dextrose 50%, dextrose 50%, glucagon (human recombinant), glucose, glucose, insulin aspart U-100, melatonin, morphine, ondansetron, sodium chloride 0.9%     Review of patient's allergies indicates:   Allergen Reactions    Motrin [ibuprofen] Other (See Comments)     Feels like heart is swelling    Penicillins Hives and Itching    Sulfa (sulfonamide antibiotics) Nausea And Vomiting     Objective:     Vital Signs (Most Recent):  Temp: 97.7 °F (36.5 °C) (12/21/20 1130)  Pulse: 69 (12/21/20 1130)  Resp: 12 (12/21/20 1130)  BP: (!) 90/53 (12/21/20 1130)  SpO2: 100 % (12/21/20 1130) Vital Signs (24h Range):  Temp:  [96 °F (35.6 °C)-98.3 °F (36.8 °C)] 97.7 °F (36.5 °C)  Pulse:  [60-78] 69  Resp:  [10-29] 12  SpO2:  [100 %] 100 %  BP: ()/(51-70) 90/53     Weight: 88.5 kg (195 lb 1.7 oz)  Body mass index is 31.49 kg/m².    Intake/Output - Last 3 Shifts       12/19 0700 - 12/20 0659 12/20 0700 - 12/21 0659 12/21 0700 - 12/22 0659    P.O.  400     I.V. (mL/kg)  1635.7 (18.5)     Blood  1071.1     IV Piggyback 2300 700     Total Intake(mL/kg) 2300 (26.6) 3806.8 (43)     Stool 1      Total Output 1      Net +2299 +3806.8            Urine Occurrence  5 x     Stool Occurrence 1 x 0 x 1 x    Emesis Occurrence  0 x           Physical Exam  Vitals signs and nursing note reviewed.   Constitutional:       General: She  is not in acute distress.     Appearance: She is ill-appearing.   HENT:      Head: Normocephalic and atraumatic.   Eyes:      Conjunctiva/sclera: Conjunctivae normal.      Pupils: Pupils are equal, round, and reactive to light.   Neck:      Musculoskeletal: Normal range of motion and neck supple.   Cardiovascular:      Rate and Rhythm: Normal rate and regular rhythm.      Pulses: Normal pulses.      Heart sounds: Normal heart sounds. No murmur. No gallop.    Pulmonary:      Effort: No respiratory distress.      Breath sounds: Normal breath sounds. No wheezing or rales.   Abdominal:      General: Bowel sounds are normal.      Palpations: Abdomen is soft.      Tenderness: There is abdominal tenderness. There is no guarding or rebound.   Musculoskeletal:         General: No swelling.   Skin:     General: Skin is warm and dry.      Coloration: Skin is pale.   Neurological:      General: No focal deficit present.      Mental Status: She is alert and oriented to person, place, and time. Mental status is at baseline.      Cranial Nerves: No cranial nerve deficit.      Motor: No weakness.   Psychiatric:         Mood and Affect: Mood normal.         Behavior: Behavior normal.         Thought Content: Thought content normal.         Judgment: Judgment normal.         Significant Labs:  WBC improved  Cr slightly improved    Significant Diagnostics: no new

## 2020-12-21 NOTE — PROGRESS NOTES
[facility]  General Surgery  Progress Note  12/20/20    Subjective:     History of Present Illness:  Brought to ER for diarrhea and decreased responsiveness.      Post-Op Info:  * No surgery found *         Interval History: Reports still feeling weak.  Reports chronic right sided abdominal pain.  Nursing reports maroon colored stools last night.  None this AM.      Medications:  Continuous Infusions:   dextrose 5 % 100 mL/hr at 12/21/20 0959    norepinephrine bitartrate-D5W Stopped (12/21/20 0845)     Scheduled Meds:   ciprofloxacin (CIPRO)400mg/200ml D5W IVPB  400 mg Intravenous Q24H    gabapentin  100 mg Oral BID    metronidazole  500 mg Intravenous Q8H    montelukast  10 mg Oral QHS    mupirocin   Nasal BID    pantoprazole  40 mg Intravenous BID    sucralfate  1 g Oral QID     PRN Meds:sodium chloride, dextrose 50%, dextrose 50%, glucagon (human recombinant), glucose, glucose, insulin aspart U-100, melatonin, morphine, ondansetron, sodium chloride 0.9%     Review of patient's allergies indicates:   Allergen Reactions    Motrin [ibuprofen] Other (See Comments)     Feels like heart is swelling    Penicillins Hives and Itching    Sulfa (sulfonamide antibiotics) Nausea And Vomiting     Objective:     Vital Signs (Most Recent):  Temp: 97.7 °F (36.5 °C) (12/21/20 1130)  Pulse: 69 (12/21/20 1130)  Resp: 12 (12/21/20 1130)  BP: (!) 90/53 (12/21/20 1130)  SpO2: 100 % (12/21/20 1130) Vital Signs (24h Range):  Temp:  [96 °F (35.6 °C)-98.3 °F (36.8 °C)] 97.7 °F (36.5 °C)  Pulse:  [60-78] 69  Resp:  [10-29] 12  SpO2:  [100 %] 100 %  BP: ()/(51-70) 90/53     Weight: 88.5 kg (195 lb 1.7 oz)  Body mass index is 31.49 kg/m².    Intake/Output - Last 3 Shifts       12/19 0700 - 12/20 0659 12/20 0700 - 12/21 0659 12/21 0700 - 12/22 0659    P.O.  400     I.V. (mL/kg)  1635.7 (18.5)     Blood  1071.1     IV Piggyback 2300 700     Total Intake(mL/kg) 2300 (26.6) 3806.8 (43)     Stool 1      Total Output 1       Net +2299 +3806.8            Urine Occurrence  5 x     Stool Occurrence 1 x 0 x 1 x    Emesis Occurrence  0 x           Physical Exam  Vitals signs and nursing note reviewed.   Constitutional:       General: She is not in acute distress.     Appearance: She is ill-appearing.   HENT:      Head: Normocephalic and atraumatic.   Eyes:      Conjunctiva/sclera: Conjunctivae normal.      Pupils: Pupils are equal, round, and reactive to light.   Neck:      Musculoskeletal: Normal range of motion and neck supple.   Cardiovascular:      Rate and Rhythm: Normal rate and regular rhythm.      Pulses: Normal pulses.      Heart sounds: Normal heart sounds. No murmur. No gallop.    Pulmonary:      Effort: No respiratory distress.      Breath sounds: Normal breath sounds. No wheezing or rales.   Abdominal:      General: Bowel sounds are normal.      Palpations: Abdomen is soft.      Tenderness: There is abdominal tenderness. There is no guarding or rebound.   Musculoskeletal:         General: No swelling.   Skin:     General: Skin is warm and dry.      Coloration: Skin is pale.   Neurological:      General: No focal deficit present.      Mental Status: She is alert and oriented to person, place, and time. Mental status is at baseline.      Cranial Nerves: No cranial nerve deficit.      Motor: No weakness.   Psychiatric:         Mood and Affect: Mood normal.         Behavior: Behavior normal.         Thought Content: Thought content normal.         Judgment: Judgment normal.         Significant Labs:  WBC improved  Cr slightly improved    Significant Diagnostics: no new    Assessment/Plan:     * Septic shock  Secondary to UTI and colitis with severe dehydration.  Vitals improved    UTI (urinary tract infection)  Antibiotics per primary  Needs stephens for UOP monitoring    Acute renal failure  Per Nephrology recs      Colitis  Antibiotics  Check stool for c diff  Clears  Likely ischemic component        Leatha Dominguez MD  General  Surgery  [facility] - ICU

## 2020-12-21 NOTE — ASSESSMENT & PLAN NOTE
Last A1c reviewed- No results found for: LABA1C, HGBA1C  Most recent fingerstick glucose reviewed-   Recent Labs   Lab 12/20/20  1737 12/21/20  0008 12/21/20  0640 12/21/20  0741   POCTGLUCOSE 118* 83 27* 59*     Current correctional scale  Low  Maintain anti-hyperglycemic dose as follows-   Antihyperglycemics (From admission, onward)    Start     Stop Route Frequency Ordered    12/20/20 0238  insulin aspart U-100 pen 0-5 Units      -- SubQ Every 6 hours PRN 12/20/20 0141        Hold Oral hypoglycemics while patient is in the hospital.

## 2020-12-21 NOTE — CONSULTS
"[facility]  Nephrology  Consult Note    Patient Name: Smiley Harmon  MRN: [id number]  Admission Date: 2020  Hospital Length of Stay: 2 days  Attending Provider: Osmany Allison Jr., MD   Primary Care Physician: Yani Carlos MD  Principal Problem:Septic shock    Consults  Subjective:     HPI:   Fatigue        Per EMS, Fly reports a rough night with pt complaining of back pain, and abdominal pain with an episode of diarrhea after a period of constipation. EMS reports patient to be very lethargic and altered. Pt answers questions appropriately at time of triage.    Back Pain    Abdominal Pain      This is a 80-year-old white female history of chronic pain, colitis, diabetes mellitus, presents to the emergency department with back pain and diarrhea after constipation.  EMS reports blood pressure was low upon arrival to the patient's house, but his increased since starting fluids    Pt. Seen in ICU- reports diarrhea is better buyt has been going on for " a while" according to pt.  She denies any N/V or diarrhea @ present, also states she has had recent UTI.    Past Medical History:   Diagnosis Date    Blood disorder     Colitis     Coronary artery disease     Diabetes mellitus     GERD (gastroesophageal reflux disease)     Heart disease, unspecified     Memory loss     Stroke        Past Surgical History:   Procedure Laterality Date    APPENDECTOMY      BILATERAL SALPINGOOPHORECTOMY       SECTION      CHOLECYSTECTOMY      DILATION AND CURETTAGE OF UTERUS      HYSTERECTOMY  age 27    bleeding    KNEE SURGERY      SHOULDER OPEN ROTATOR CUFF REPAIR         Review of patient's allergies indicates:   Allergen Reactions    Motrin [ibuprofen] Other (See Comments)     Feels like heart is swelling    Penicillins Hives and Itching    Sulfa (sulfonamide antibiotics) Nausea And Vomiting     Current Facility-Administered Medications   Medication Frequency    0.9%  NaCl infusion (for " blood administration) Q24H PRN    ciprofloxacin (CIPRO)400mg/200ml D5W IVPB 400 mg Q24H    dextrose 5 % infusion Continuous    dextrose 50% injection 12.5 g PRN    dextrose 50% injection 25 g PRN    gabapentin capsule 100 mg BID    glucagon (human recombinant) injection 1 mg PRN    glucose chewable tablet 16 g PRN    glucose chewable tablet 24 g PRN    insulin aspart U-100 pen 0-5 Units Q6H PRN    melatonin tablet 6 mg Nightly PRN    metronidazole IVPB 500 mg Q8H    montelukast tablet 10 mg QHS    morphine injection 1 mg Q4H PRN    mupirocin 2 % ointment BID    norepinephrine 4 mg in dextrose 5% 250 mL infusion (premix) (titrating) Continuous    ondansetron injection 4 mg Q8H PRN    pantoprazole injection 40 mg BID    sodium chloride 0.9% flush 10 mL PRN    sucralfate tablet 1 g QID     Family History     Problem Relation (Age of Onset)    Cancer Maternal Grandmother, Paternal Grandfather    Migraines Mother, Paternal Aunt, Son    Stroke Paternal Aunt        Tobacco Use    Smoking status: Former Smoker     Quit date: 1/21/2005     Years since quitting: 15.9   Substance and Sexual Activity    Alcohol use: No    Drug use: No    Sexual activity: Not Currently     Partners: Male     Birth control/protection: Surgical, Post-menopausal     Comment:      Review of Systems   Constitutional: Positive for activity change, appetite change and fatigue.   Respiratory: Positive for cough and shortness of breath.    Gastrointestinal: Positive for diarrhea.   Genitourinary: Positive for decreased urine volume and urgency.   Musculoskeletal: Positive for arthralgias.   Neurological: Positive for light-headedness.   All other systems reviewed and are negative.    Objective:     Vital Signs (Most Recent):  Temp: 97.7 °F (36.5 °C) (12/21/20 1130)  Pulse: 69 (12/21/20 1130)  Resp: 12 (12/21/20 1130)  BP: (!) 90/53 (12/21/20 1130)  SpO2: 100 % (12/21/20 1130)  O2 Device (Oxygen Therapy): room air (12/21/20  1125) Vital Signs (24h Range):  Temp:  [96 °F (35.6 °C)-98.3 °F (36.8 °C)] 97.7 °F (36.5 °C)  Pulse:  [60-78] 69  Resp:  [10-29] 12  SpO2:  [100 %] 100 %  BP: ()/(51-70) 90/53     Weight: 88.5 kg (195 lb 1.7 oz) (12/21/20 0600)  Body mass index is 31.49 kg/m².  Body surface area is 2.03 meters squared.    I/O last 3 completed shifts:  In: 3806.8 [P.O.:400; I.V.:1635.7; Blood:1071.1; IV Piggyback:700]  Out: -     Physical Exam  Constitutional:       General: She is not in acute distress.     Appearance: Normal appearance. She is obese. She is ill-appearing.   HENT:      Head: Normocephalic and atraumatic.   Cardiovascular:      Rate and Rhythm: Normal rate and regular rhythm.      Pulses: Normal pulses.      Heart sounds: Normal heart sounds.   Pulmonary:      Effort: Pulmonary effort is normal.      Breath sounds: Wheezing present. No rales.   Abdominal:      General: Bowel sounds are normal.      Palpations: Abdomen is soft.   Skin:     General: Skin is warm and dry.   Neurological:      General: No focal deficit present.      Mental Status: She is alert. Mental status is at baseline.   Psychiatric:         Mood and Affect: Mood normal.         Behavior: Behavior normal.         Thought Content: Thought content normal.         Significant Labs:  Cardiac Markers: No results for input(s): CKMB, TROPONINT, MYOGLOBIN in the last 168 hours.  CBC:   Recent Labs   Lab 12/21/20  0410   WBC 11.32   RBC 3.03*   HGB 8.3*   HCT 26.5*      MCV 88   MCH 27.4   MCHC 31.3*     CMP:   Recent Labs   Lab 12/21/20  0410   GLU 98   CALCIUM 7.0*   ALBUMIN 2.3*   PROT 5.1*      K 4.1   CO2 19*   *   BUN 49*   CREATININE 3.2*   ALKPHOS 126   ALT 9*   AST 18   BILITOT 0.4     Microbiology Results (last 7 days)     Procedure Component Value Units Date/Time    Urine culture [[id number]]  (Abnormal) Collected: 12/19/20 2948    Order Status: Completed Specimen: Urine Updated: 12/21/20 0910     Urine Culture, Routine  GRAM NEGATIVE RAVI  >100,000 cfu/ml  Identification and susceptibility pending      Narrative:      Specimen Source->Urine    Blood culture #1 **CANNOT BE ORDERED STAT** [[id number]] Collected: 12/19/20 0700    Order Status: Completed Specimen: Blood Updated: 12/20/20 2212     Blood Culture, Routine No Growth to date      No Growth to date    Blood culture #2 **CANNOT BE ORDERED STAT** [[id number]] Collected: 12/19/20 0719    Order Status: Completed Specimen: Blood Updated: 12/20/20 2212     Blood Culture, Routine No Growth to date      No Growth to date        Recent Labs   Lab 12/19/20 0748   COLORU Yellow   SPECGRAV 1.010   PHUR 6.0   PROTEINUA 1+*   BACTERIA Many*   NITRITE Negative   LEUKOCYTESUR 3+*   UROBILINOGEN Negative   HYALINECASTS 0       Significant Imaging:  Labs: Reviewed  X-Ray: Reviewed  US: Reviewed  CT: Reviewed    Assessment/Plan:     Acute renal failure  JOSE A- improving with IV hydration- will change IVF to bicarbonate drip and correct metabolic acidosis.  Follow intake/ urine output.  Advance po intake- increase oral fluids.  Cont. Cipro for UTI- due to Gram neg.    JOSE A- improving creat.- cont IV fluids and monitor labs.  Will follow intake/ urine output.  Cont Abx. Coverage for UTI with Cipro.  Will change IVF lfuids to 1/2 NS with sodium bicarbonate to correct metabolic acidosis.        Thank you for your consult. I will follow-up with patient. Please contact us if you have any additional questions.    Macarena Trejo MD  Nephrology  [facility]

## 2020-12-21 NOTE — CARE UPDATE
Critical glucose = 25 reading on glucometer - rechecked with new strip glucose = 83. New order put in for lab to draw random glucose to verify.

## 2020-12-21 NOTE — PLAN OF CARE
Problem: Adult Inpatient Plan of Care  Goal: Plan of Care Review  Outcome: Ongoing, Progressing    Patient progressing. On lower dosage of levophed. Receiving blood transfusion today. Blood glucose has been low, iv fluids changed to help with that. No bowel movement today.

## 2020-12-21 NOTE — PLAN OF CARE
Problem: Adult Inpatient Plan of Care  Goal: Plan of Care Review  Outcome: Ongoing, Progressing     Patient had 2 moderate size BM today - liquid/maroon. Able to tolerate clear liquids. Attempting to wean patient off levophed. Patient pain controlled with pain medication. Blood sugars low this morning treated with PO glucose tablets. Patient remains incontinent of urine. Will continue to monitor.

## 2020-12-21 NOTE — ASSESSMENT & PLAN NOTE
Lab Results   Component Value Date    CREATININE 3.2 (H) 12/21/2020    CREATININE 3.6 (H) 12/20/2020    CREATININE 3.9 (H) 12/19/2020      Will continue gentle IVF.      Will consult Nephrology.

## 2020-12-21 NOTE — PROGRESS NOTES
[facility] - ICU Hospital Medicine  Progress Note    Patient Name: Smiley Harmon  MRN: [id number]  Patient Class: IP- Inpatient   Admission Date: 12/19/2020  Length of Stay: 2 days  Attending Physician: Osmany Allison Jr., MD  Primary Care Provider: Yani Carlos MD        Subjective:     Principal Problem:Septic shock        HPI:  No notes on file    Overview/Hospital Course:  12/21/2020:  Patient states that overall she feels slightly better.  She has experienced an episode of hypoglycemia.  I plan to transition the patient's fluid to D5W.  She has been given an amp of bicarb.  We will continue close ICU monitoring.  She is requiring very minimal levo fed.  Will discontinue once mean arterial pressures remained stable.    Interval History: Episode of hypoglycemia.  Otherwise feeling better.    Review of Systems   Constitutional: Positive for appetite change and fatigue.   Gastrointestinal: Positive for abdominal pain, diarrhea and nausea. Negative for blood in stool and vomiting.   Musculoskeletal: Positive for back pain.   Neurological: Positive for weakness and light-headedness.   Psychiatric/Behavioral: Positive for sleep disturbance. Negative for agitation.   All other systems reviewed and are negative.    Objective:     Vital Signs (Most Recent):  Temp: 97.8 °F (36.6 °C) (12/21/20 0745)  Pulse: 70 (12/21/20 0745)  Resp: 20 (12/21/20 0745)  BP: 96/70 (12/21/20 0745)  SpO2: 100 % (12/21/20 0745) Vital Signs (24h Range):  Temp:  [96 °F (35.6 °C)-98.3 °F (36.8 °C)] 97.8 °F (36.6 °C)  Pulse:  [60-78] 70  Resp:  [10-29] 20  SpO2:  [100 %] 100 %  BP: ()/(51-70) 96/70     Weight: 88.5 kg (195 lb 1.7 oz)  Body mass index is 31.49 kg/m².    Intake/Output Summary (Last 24 hours) at 12/21/2020 0853  Last data filed at 12/21/2020 0600  Gross per 24 hour   Intake 3806.83 ml   Output --   Net 3806.83 ml      Physical Exam  Vitals signs and nursing note reviewed.   Constitutional:       General: She is in  acute distress.      Appearance: She is obese. She is ill-appearing.   HENT:      Head: Normocephalic and atraumatic.      Mouth/Throat:      Mouth: Mucous membranes are dry.      Pharynx: Oropharynx is clear.   Eyes:      Extraocular Movements: Extraocular movements intact.      Pupils: Pupils are equal, round, and reactive to light.   Neck:      Musculoskeletal: Neck supple.   Cardiovascular:      Rate and Rhythm: Normal rate and regular rhythm.      Heart sounds: Normal heart sounds. No murmur. No gallop.    Pulmonary:      Effort: Pulmonary effort is normal. No respiratory distress.      Breath sounds: Normal breath sounds. No stridor. No wheezing or rales.   Abdominal:      General: Bowel sounds are normal. There is no distension.      Palpations: Abdomen is soft.      Tenderness: There is abdominal tenderness (suprapubic and LLQ).   Musculoskeletal: Normal range of motion.         General: No swelling.   Skin:     General: Skin is warm and dry.      Coloration: Skin is pale.   Neurological:      General: No focal deficit present.      Mental Status: She is alert. Mental status is at baseline. She is disoriented.      Motor: Weakness present.   Psychiatric:         Mood and Affect: Mood normal.         Significant Labs: All pertinent labs within the past 24 hours have been reviewed.    Significant Imaging: I have reviewed and interpreted all pertinent imaging results/findings within the past 24 hours.      Assessment/Plan:      * Septic shock  This is secondary to colitis.  Current treatment includes metronidazole and ciprofloxacin.  Overall symptomatic improvement noted.    We will wean Levophed as tolerated.          Diabetes mellitus, type 2  Last A1c reviewed- No results found for: LABA1C, HGBA1C  Most recent fingerstick glucose reviewed-   Recent Labs   Lab 12/20/20  1737 12/21/20  0008 12/21/20  0640 12/21/20  0741   POCTGLUCOSE 118* 83 27* 59*     Current correctional scale  Low  Maintain  anti-hyperglycemic dose as follows-   Antihyperglycemics (From admission, onward)    Start     Stop Route Frequency Ordered    12/20/20 0238  insulin aspart U-100 pen 0-5 Units      -- SubQ Every 6 hours PRN 12/20/20 0141        Hold Oral hypoglycemics while patient is in the hospital.        Hypoglycemia  Current treatment: D5W at 100 cc/h.  Will provide amp of D50 as needed.      UTI (urinary tract infection)  Current treatment: Ciprofloxacin.  With tailored based on culture and sensitivity.      Acute renal failure  Lab Results   Component Value Date    CREATININE 3.2 (H) 12/21/2020    CREATININE 3.6 (H) 12/20/2020    CREATININE 3.9 (H) 12/19/2020      Will continue gentle IVF.      Will consult Nephrology.      Colitis  Current treatment: Ciprofloxacin and Flagyl.        VTE Risk Mitigation (From admission, onward)         Ordered     IP VTE HIGH RISK PATIENT  Once      12/19/20 0958     Place CAROLINE hose  Until discontinued      12/19/20 0958                Discharge Planning   MARCELLE:      Code Status: Full Code   Is the patient medically ready for discharge?:     Reason for patient still in hospital (select all that apply): Patient unstable           CC time:  40 min          Osmany Allison Jr, MD  Department of Hospital Medicine   [facility]

## 2020-12-22 LAB
ALBUMIN SERPL BCP-MCNC: 2.5 G/DL (ref 3.5–5.2)
ALP SERPL-CCNC: 117 U/L (ref 55–135)
ALT SERPL W/O P-5'-P-CCNC: 9 U/L (ref 10–44)
ANION GAP SERPL CALC-SCNC: 8 MMOL/L (ref 8–16)
AST SERPL-CCNC: 22 U/L (ref 10–40)
BACTERIA UR CULT: ABNORMAL
BASOPHILS # BLD AUTO: 0.06 K/UL (ref 0–0.2)
BASOPHILS NFR BLD: 0.8 % (ref 0–1.9)
BILIRUB SERPL-MCNC: 0.3 MG/DL (ref 0.1–1)
BUN SERPL-MCNC: 42 MG/DL (ref 8–23)
CALCIUM SERPL-MCNC: 7.3 MG/DL (ref 8.7–10.5)
CHLORIDE SERPL-SCNC: 110 MMOL/L (ref 95–110)
CO2 SERPL-SCNC: 22 MMOL/L (ref 23–29)
CREAT SERPL-MCNC: 3 MG/DL (ref 0.5–1.4)
DIFFERENTIAL METHOD: ABNORMAL
EOSINOPHIL # BLD AUTO: 0.4 K/UL (ref 0–0.5)
EOSINOPHIL NFR BLD: 4.8 % (ref 0–8)
ERYTHROCYTE [DISTWIDTH] IN BLOOD BY AUTOMATED COUNT: 19.1 % (ref 11.5–14.5)
EST. GFR  (AFRICAN AMERICAN): 16.5 ML/MIN/1.73 M^2
EST. GFR  (NON AFRICAN AMERICAN): 14.3 ML/MIN/1.73 M^2
GLUCOSE SERPL-MCNC: 74 MG/DL (ref 70–110)
HCT VFR BLD AUTO: 23.1 % (ref 37–48.5)
HGB BLD-MCNC: 7.4 G/DL (ref 12–16)
IMM GRANULOCYTES # BLD AUTO: 0.03 K/UL (ref 0–0.04)
IMM GRANULOCYTES NFR BLD AUTO: 0.4 % (ref 0–0.5)
LYMPHOCYTES # BLD AUTO: 1.4 K/UL (ref 1–4.8)
LYMPHOCYTES NFR BLD: 18 % (ref 18–48)
MCH RBC QN AUTO: 27.6 PG (ref 27–31)
MCHC RBC AUTO-ENTMCNC: 32 G/DL (ref 32–36)
MCV RBC AUTO: 86 FL (ref 82–98)
MONOCYTES # BLD AUTO: 0.5 K/UL (ref 0.3–1)
MONOCYTES NFR BLD: 5.9 % (ref 4–15)
NEUTROPHILS # BLD AUTO: 5.4 K/UL (ref 1.8–7.7)
NEUTROPHILS NFR BLD: 70.1 % (ref 38–73)
NRBC BLD-RTO: 0 /100 WBC
PLATELET # BLD AUTO: 186 K/UL (ref 150–350)
PMV BLD AUTO: 10.8 FL (ref 9.2–12.9)
POCT GLUCOSE: 109 MG/DL (ref 70–110)
POCT GLUCOSE: 153 MG/DL (ref 70–110)
POCT GLUCOSE: 27 MG/DL (ref 70–110)
POCT GLUCOSE: 65 MG/DL (ref 70–110)
POCT GLUCOSE: 88 MG/DL (ref 70–110)
POCT GLUCOSE: 98 MG/DL (ref 70–110)
POCT GLUCOSE: <20 MG/DL (ref 70–110)
POCT GLUCOSE: <20 MG/DL (ref 70–110)
POTASSIUM SERPL-SCNC: 3.9 MMOL/L (ref 3.5–5.1)
PROT SERPL-MCNC: 5.4 G/DL (ref 6–8.4)
RBC # BLD AUTO: 2.68 M/UL (ref 4–5.4)
SODIUM SERPL-SCNC: 140 MMOL/L (ref 136–145)
WBC # BLD AUTO: 7.66 K/UL (ref 3.9–12.7)

## 2020-12-22 PROCEDURE — 63600175 PHARM REV CODE 636 W HCPCS: Performed by: INTERNAL MEDICINE

## 2020-12-22 PROCEDURE — 25000003 PHARM REV CODE 250: Performed by: INTERNAL MEDICINE

## 2020-12-22 PROCEDURE — C9113 INJ PANTOPRAZOLE SODIUM, VIA: HCPCS | Performed by: INTERNAL MEDICINE

## 2020-12-22 PROCEDURE — 99900035 HC TECH TIME PER 15 MIN (STAT)

## 2020-12-22 PROCEDURE — 85025 COMPLETE CBC W/AUTO DIFF WBC: CPT

## 2020-12-22 PROCEDURE — 99900031 HC PATIENT EDUCATION (STAT)

## 2020-12-22 PROCEDURE — S0030 INJECTION, METRONIDAZOLE: HCPCS | Performed by: EMERGENCY MEDICINE

## 2020-12-22 PROCEDURE — 20000000 HC ICU ROOM

## 2020-12-22 PROCEDURE — 25000003 PHARM REV CODE 250: Performed by: EMERGENCY MEDICINE

## 2020-12-22 PROCEDURE — 94761 N-INVAS EAR/PLS OXIMETRY MLT: CPT

## 2020-12-22 PROCEDURE — 36415 COLL VENOUS BLD VENIPUNCTURE: CPT

## 2020-12-22 PROCEDURE — 80053 COMPREHEN METABOLIC PANEL: CPT

## 2020-12-22 RX ORDER — DEXTROSE MONOHYDRATE 50 MG/ML
INJECTION, SOLUTION INTRAVENOUS CONTINUOUS
Status: DISCONTINUED | OUTPATIENT
Start: 2020-12-22 | End: 2020-12-22

## 2020-12-22 RX ORDER — HYDROCODONE BITARTRATE AND ACETAMINOPHEN 10; 325 MG/1; MG/1
1 TABLET ORAL EVERY 6 HOURS PRN
Status: DISCONTINUED | OUTPATIENT
Start: 2020-12-22 | End: 2020-12-23

## 2020-12-22 RX ADMIN — PANTOPRAZOLE SODIUM 40 MG: 40 INJECTION, POWDER, FOR SOLUTION INTRAVENOUS at 08:12

## 2020-12-22 RX ADMIN — MONTELUKAST 10 MG: 10 TABLET, FILM COATED ORAL at 08:12

## 2020-12-22 RX ADMIN — MUPIROCIN: 20 OINTMENT TOPICAL at 08:12

## 2020-12-22 RX ADMIN — CIPROFLOXACIN 400 MG: 2 INJECTION, SOLUTION INTRAVENOUS at 09:12

## 2020-12-22 RX ADMIN — SUCRALFATE 1 G: 1 TABLET ORAL at 12:12

## 2020-12-22 RX ADMIN — MORPHINE SULFATE 1 MG: 2 INJECTION, SOLUTION INTRAMUSCULAR; INTRAVENOUS at 05:12

## 2020-12-22 RX ADMIN — METRONIDAZOLE 500 MG: 500 INJECTION, SOLUTION INTRAVENOUS at 08:12

## 2020-12-22 RX ADMIN — METRONIDAZOLE 500 MG: 500 INJECTION, SOLUTION INTRAVENOUS at 04:12

## 2020-12-22 RX ADMIN — SODIUM BICARBONATE: 84 INJECTION, SOLUTION INTRAVENOUS at 12:12

## 2020-12-22 RX ADMIN — HYDROCODONE BITARTRATE AND ACETAMINOPHEN 1 TABLET: 10; 325 TABLET ORAL at 06:12

## 2020-12-22 RX ADMIN — GABAPENTIN 100 MG: 100 CAPSULE ORAL at 08:12

## 2020-12-22 RX ADMIN — SODIUM BICARBONATE: 84 INJECTION, SOLUTION INTRAVENOUS at 09:12

## 2020-12-22 RX ADMIN — Medication 24 G: at 05:12

## 2020-12-22 RX ADMIN — SUCRALFATE 1 G: 1 TABLET ORAL at 08:12

## 2020-12-22 RX ADMIN — METRONIDAZOLE 500 MG: 500 INJECTION, SOLUTION INTRAVENOUS at 11:12

## 2020-12-22 RX ADMIN — HYDROCODONE BITARTRATE AND ACETAMINOPHEN 1 TABLET: 10; 325 TABLET ORAL at 09:12

## 2020-12-22 RX ADMIN — Medication 16 G: at 05:12

## 2020-12-22 RX ADMIN — MELATONIN 6 MG: 3 TAB ORAL at 08:12

## 2020-12-22 RX ADMIN — METRONIDAZOLE 500 MG: 500 INJECTION, SOLUTION INTRAVENOUS at 12:12

## 2020-12-22 RX ADMIN — SUCRALFATE 1 G: 1 TABLET ORAL at 04:12

## 2020-12-22 RX ADMIN — SODIUM BICARBONATE: 84 INJECTION, SOLUTION INTRAVENOUS at 08:12

## 2020-12-22 NOTE — PLAN OF CARE
Problem: Adult Inpatient Plan of Care  Goal: Plan of Care Review  Outcome: Ongoing, Progressing     Patient's blood sugar well controlled today without needing glucose tablets. Blood pressure stable - patient no longer on levophed. Afebrile. Patient did have 1 moderate and 1 small liquid dark red bowel movement today. Patient diet advanced to full liquid, patient tolerating has eaten 100% of all meals today. Pain well controlled with Norco. Will continue to monitor.

## 2020-12-22 NOTE — NURSING
Pt blood sugar less 27, rechecked, less than 20. Pt is asymptomatic. 6 glucose tablets given per order. Will recheck blood sugar.

## 2020-12-22 NOTE — PROGRESS NOTES
[facility] [place] [facility]  General Surgery  Progress Note  12/21/20    Subjective:     History of Present Illness:  Brought to ER for diarrhea and decreased responsiveness.      Post-Op Info:  * No surgery found *         Interval History: No new issues.  Still some pain in RLQ.  No nausea or vomiting.     Medications:  Continuous Infusions:   norepinephrine bitartrate-D5W Stopped (12/21/20 1545)    custom IV infusion builder 100 mL/hr at 12/21/20 1400     Scheduled Meds:   ciprofloxacin (CIPRO)400mg/200ml D5W IVPB  400 mg Intravenous Q24H    gabapentin  100 mg Oral BID    metronidazole  500 mg Intravenous Q8H    montelukast  10 mg Oral QHS    mupirocin   Nasal BID    pantoprazole  40 mg Intravenous BID    sucralfate  1 g Oral QID     PRN Meds:sodium chloride, dextrose 50%, dextrose 50%, glucagon (human recombinant), glucose, glucose, insulin aspart U-100, melatonin, morphine, ondansetron, sodium chloride 0.9%     Review of patient's allergies indicates:   Allergen Reactions    Motrin [ibuprofen] Other (See Comments)     Feels like heart is swelling    Penicillins Hives and Itching    Sulfa (sulfonamide antibiotics) Nausea And Vomiting     Objective:     Vital Signs (Most Recent):  Temp: 97.8 °F (36.6 °C) (12/21/20 1515)  Pulse: 65 (12/21/20 1515)  Resp: 15 (12/21/20 1734)  BP: (!) 123/55 (12/21/20 1515)  SpO2: 100 % (12/21/20 1515) Vital Signs (24h Range):  Temp:  [97.7 °F (36.5 °C)-98.3 °F (36.8 °C)] 97.8 °F (36.6 °C)  Pulse:  [65-77] 65  Resp:  [10-29] 15  SpO2:  [100 %] 100 %  BP: ()/(52-70) 123/55     Weight: 88.5 kg (195 lb 1.7 oz)  Body mass index is 31.49 kg/m².    Intake/Output - Last 3 Shifts       12/19 0700 - 12/20 0659 12/20 0700 - 12/21 0659 12/21 0700 - 12/22 0659    P.O.  400     I.V. (mL/kg)  1635.7 (18.5) 1215 (13.7)    Blood  1071.1     IV Piggyback 2300 700 177    Total Intake(mL/kg) 2300 (26.6) 3806.8 (43) 1392 (15.7)    Stool 1      Total Output 1      Net +2299 +3806.8 +1392            Urine Occurrence  5 x 2 x    Stool Occurrence 1 x 0 x 6 x    Emesis Occurrence  0 x           Physical Exam  Vitals signs and nursing note reviewed.   Constitutional:       General: She is not in acute distress.     Appearance: Normal appearance.   HENT:      Head: Normocephalic and atraumatic.      Mouth/Throat:      Mouth: Mucous membranes are moist.      Pharynx: Oropharynx is clear.   Eyes:      Conjunctiva/sclera: Conjunctivae normal.      Pupils: Pupils are equal, round, and reactive to light.   Neck:      Musculoskeletal: Neck supple.   Cardiovascular:      Rate and Rhythm: Normal rate and regular rhythm.      Pulses: Normal pulses.      Heart sounds: Normal heart sounds. No murmur. No gallop.    Pulmonary:      Effort: Pulmonary effort is normal. No respiratory distress.      Breath sounds: Normal breath sounds. No stridor. No wheezing, rhonchi or rales.   Abdominal:      General: Abdomen is flat. Bowel sounds are normal. There is no distension.      Palpations: Abdomen is soft.      Tenderness: There is abdominal tenderness (mild suprapubic).   Musculoskeletal: Normal range of motion.         General: No swelling.   Skin:     General: Skin is warm and dry.      Coloration: Skin is pale. Skin is not jaundiced.   Neurological:      General: No focal deficit present.      Mental Status: She is alert and oriented to person, place, and time.   Psychiatric:         Mood and Affect: Mood normal.         Behavior: Behavior normal.         Thought Content: Thought content normal.         Judgment: Judgment normal.         Significant Labs:  CBC:   Recent Labs   Lab 12/21/20  0410   WBC 11.32   RBC 3.03*   HGB 8.3*   HCT 26.5*      MCV 88   MCH 27.4   MCHC 31.3*     BMP:   Recent Labs   Lab 12/19/20  0725  12/21/20  0410 12/21/20  1228   *   < > 98 111*   *   < > 138  --    K 6.4*   < > 4.1  --       < > 112*  --    CO2 20*   < > 19*  --    BUN 57*   < > 49*  --    CREATININE 4.2*   <  > 3.2*  --    CALCIUM 9.3   < > 7.0*  --    MG 3.5*  --   --   --     < > = values in this interval not displayed.     CMP:   Recent Labs   Lab 12/21/20  0410 12/21/20  1228   GLU 98 111*   CALCIUM 7.0*  --    ALBUMIN 2.3*  --    PROT 5.1*  --      --    K 4.1  --    CO2 19*  --    *  --    BUN 49*  --    CREATININE 3.2*  --    ALKPHOS 126  --    ALT 9*  --    AST 18  --    BILITOT 0.4  --        Significant Diagnostics:  no new    Assessment/Plan:     * Septic shock  Secondary to UTI and colitis with severe dehydration.  Vitals reveal slightly decreased BP.  Off pressors.    Hypoglycemia  On D5 drip.  Likely may need TPN    UTI (urinary tract infection)  Antibiotics per primary  Cont stephens for UOP monitoring    Acute renal failure  Per Nephrology recs.  Slowly improving creatinine      Colitis  Antibiotics  Check stool for c diff  Clears  Likely ischemic component.  Monitor H/H        Leatha Dominguez MD  General Surgery  [facility] - ICU

## 2020-12-22 NOTE — SUBJECTIVE & OBJECTIVE
Interval History: No acute events overnight.     Review of Systems   Constitutional: Positive for appetite change and fatigue.   Gastrointestinal: Positive for abdominal pain, diarrhea and nausea. Negative for blood in stool and vomiting.   Musculoskeletal: Positive for back pain.   Neurological: Positive for weakness and light-headedness.   Psychiatric/Behavioral: Positive for sleep disturbance. Negative for agitation.   All other systems reviewed and are negative.    Objective:     Vital Signs (Most Recent):  Temp: 98.4 °F (36.9 °C) (12/22/20 0820)  Pulse: 79 (12/22/20 0820)  Resp: (!) 22 (12/22/20 0820)  BP: (!) 111/55 (12/22/20 0820)  SpO2: 100 % (12/22/20 0600) Vital Signs (24h Range):  Temp:  [97.7 °F (36.5 °C)-98.4 °F (36.9 °C)] 98.4 °F (36.9 °C)  Pulse:  [65-83] 79  Resp:  [10-24] 22  SpO2:  [100 %] 100 %  BP: ()/(51-70) 111/55     Weight: 88.5 kg (195 lb 1.7 oz)  Body mass index is 31.49 kg/m².    Intake/Output Summary (Last 24 hours) at 12/22/2020 0854  Last data filed at 12/22/2020 0600  Gross per 24 hour   Intake 3156 ml   Output --   Net 3156 ml      Physical Exam  Vitals signs and nursing note reviewed.   Constitutional:       General: She is in acute distress.      Appearance: She is obese. She is ill-appearing.   HENT:      Head: Normocephalic and atraumatic.      Mouth/Throat:      Mouth: Mucous membranes are dry.      Pharynx: Oropharynx is clear.   Eyes:      Extraocular Movements: Extraocular movements intact.      Pupils: Pupils are equal, round, and reactive to light.   Neck:      Musculoskeletal: Neck supple.   Cardiovascular:      Rate and Rhythm: Normal rate and regular rhythm.      Heart sounds: Normal heart sounds. No murmur. No gallop.    Pulmonary:      Effort: Pulmonary effort is normal. No respiratory distress.      Breath sounds: Normal breath sounds. No stridor. No wheezing or rales.   Abdominal:      General: Bowel sounds are normal. There is no distension.      Palpations:  Abdomen is soft.      Tenderness: There is abdominal tenderness (suprapubic and LLQ).   Musculoskeletal: Normal range of motion.         General: No swelling.   Skin:     General: Skin is warm and dry.      Coloration: Skin is pale.   Neurological:      General: No focal deficit present.      Mental Status: She is alert. Mental status is at baseline. She is disoriented.      Motor: Weakness present.   Psychiatric:         Mood and Affect: Mood normal.         Significant Labs: All pertinent labs within the past 24 hours have been reviewed.    Significant Imaging: I have reviewed and interpreted all pertinent imaging results/findings within the past 24 hours.

## 2020-12-22 NOTE — PLAN OF CARE
12/22/20 1325   Discharge Assessment   Assessment Type Discharge Planning Reassessment   Confirmed/corrected address and phone number on facesheet? Yes   Able to Return to Prior Arrangements no   Discharge Plan A Home with family   Discharge Plan B Skilled Nursing Facility   Patient/Family in Agreement with Plan other (see comments)  (Son realizing patient may require SNF.)   Spoke with son Arie Anglin per phone.  Informed him patient's perception of discharge plan is that she is going home and son will care for her.  He states ideally that would be his plan A, but medical issues of GI nature with bouts of diarrhea would need to be resolved before this is a workable plan.  He then mentions nursing care at home, and indicates that home health might be an option.  Explained to him that home health agencies do not provide 8 hours day nursing care or sitters, that this would most likely require private pay, which he indicates he would not be able to do.  When questioned further about other options, he feels SNF may have to be an option.  But he wants to talk to physician first to inquire if medical issues may be resolved enough for patient to return home.  He indicates patient needs to be able to get herself to the bedside commode and toilet herself on her own.  Will forward son's phone number to MD and message that son would like to talk with him.

## 2020-12-22 NOTE — ASSESSMENT & PLAN NOTE
Secondary to UTI and colitis with severe dehydration.  Vitals reveal normalizes BP.   Od BP.  Off pressors.

## 2020-12-22 NOTE — PLAN OF CARE
Pt did not require levophed all shift. Pt remained afebrile throughout shift. Pt did have hypoglycemic episode, glucose tablets given and blood sugars increased. Pt only had one BM which was liquid and dark red/brown. Pt complains of back pain radiating to ribs, morphine helps. Vital signs stable. Will continue to monitor.

## 2020-12-22 NOTE — ASSESSMENT & PLAN NOTE
This is secondary to colitis.  Current treatment includes metronidazole and ciprofloxacin.  Overall symptomatic improvement noted.    No off levophed.

## 2020-12-22 NOTE — ASSESSMENT & PLAN NOTE
Lab Results   Component Value Date    CREATININE 3.0 (H) 12/22/2020    CREATININE 3.2 (H) 12/21/2020    CREATININE 3.6 (H) 12/20/2020      Will continue gentle IVF.      Will consult Nephrology.

## 2020-12-22 NOTE — PROGRESS NOTES
[facility] [place] [facility]  General Surgery  Progress Note  12/22/20    Subjective:     History of Present Illness:  Brought to ER for diarrhea and decreased responsiveness.      Post-Op Info:  * No surgery found *         Interval History: Feels better today.  Tolerating po intake.  Minimal abdominal pain.      Medications:  Continuous Infusions:   norepinephrine bitartrate-D5W Stopped (12/21/20 1545)    sodium bicarbonate drip 100 mL/hr at 12/22/20 0835     Scheduled Meds:   ciprofloxacin (CIPRO)400mg/200ml D5W IVPB  400 mg Intravenous Q24H    gabapentin  100 mg Oral BID    metronidazole  500 mg Intravenous Q8H    montelukast  10 mg Oral QHS    mupirocin   Nasal BID    pantoprazole  40 mg Intravenous BID    sucralfate  1 g Oral QID     PRN Meds:sodium chloride, dextrose 50%, dextrose 50%, glucagon (human recombinant), glucose, glucose, HYDROcodone-acetaminophen, insulin aspart U-100, melatonin, ondansetron, sodium chloride 0.9%     Review of patient's allergies indicates:   Allergen Reactions    Motrin [ibuprofen] Other (See Comments)     Feels like heart is swelling    Penicillins Hives and Itching    Sulfa (sulfonamide antibiotics) Nausea And Vomiting     Objective:     Vital Signs (Most Recent):  Temp: 98.2 °F (36.8 °C) (12/22/20 1200)  Pulse: 79 (12/22/20 1230)  Resp: 16 (12/22/20 1230)  BP: (!) 98/54 (12/22/20 1230)  SpO2: 100 % (12/22/20 1230) Vital Signs (24h Range):  Temp:  [98 °F (36.7 °C)-98.4 °F (36.9 °C)] 98.2 °F (36.8 °C)  Pulse:  [75-92] 79  Resp:  [10-45] 16  SpO2:  [99 %-100 %] 100 %  BP: ()/(51-74) 98/54     Weight: 88.5 kg (195 lb 1.7 oz)  Body mass index is 31.49 kg/m².    Intake/Output - Last 3 Shifts       12/20 0700 - 12/21 0659 12/21 0700 - 12/22 0659 12/22 0700 - 12/23 0659    P.O. 400 300     I.V. (mL/kg) 1635.7 (18.5) 2379 (26.9)     Blood 1071.1      IV Piggyback 700 477     Total Intake(mL/kg) 3806.8 (43) 3156 (35.7)     Stool       Total Output       Net +3806.8 +3156             Urine Occurrence 5 x 2 x     Stool Occurrence 0 x 6 x 2 x    Emesis Occurrence 0 x            Physical Exam  Vitals signs and nursing note reviewed.   Constitutional:       General: She is not in acute distress.     Appearance: Normal appearance. She is not ill-appearing.   HENT:      Head: Normocephalic and atraumatic.      Nose: Nose normal.      Mouth/Throat:      Mouth: Mucous membranes are moist.      Pharynx: Oropharynx is clear.   Eyes:      Conjunctiva/sclera: Conjunctivae normal.      Pupils: Pupils are equal, round, and reactive to light.   Neck:      Musculoskeletal: Neck supple.   Cardiovascular:      Rate and Rhythm: Normal rate and regular rhythm.      Pulses: Normal pulses.      Heart sounds: Normal heart sounds. No murmur. No gallop.    Pulmonary:      Effort: Pulmonary effort is normal. No respiratory distress.      Breath sounds: Normal breath sounds. No stridor. No wheezing, rhonchi or rales.   Abdominal:      General: Abdomen is flat. Bowel sounds are normal. There is no distension.      Palpations: Abdomen is soft.      Tenderness: There is abdominal tenderness (right lower quadrant and suprapubic). There is guarding (voluntary).   Musculoskeletal: Normal range of motion.         General: No swelling.   Skin:     General: Skin is warm and dry.      Coloration: Skin is not jaundiced or pale.   Neurological:      General: No focal deficit present.      Mental Status: She is alert and oriented to person, place, and time. Mental status is at baseline.      Motor: No weakness.   Psychiatric:         Mood and Affect: Mood normal.         Thought Content: Thought content normal.         Judgment: Judgment normal.         Significant Labs:  CBC:   Recent Labs   Lab 12/22/20  0355   WBC 7.66   RBC 2.68*   HGB 7.4*   HCT 23.1*      MCV 86   MCH 27.6   MCHC 32.0     BMP:   Recent Labs   Lab 12/19/20  0725  12/22/20  0354   *   < > 74   *   < > 140   K 6.4*   < > 3.9      <  > 110   CO2 20*   < > 22*   BUN 57*   < > 42*   CREATININE 4.2*   < > 3.0*   CALCIUM 9.3   < > 7.3*   MG 3.5*  --   --     < > = values in this interval not displayed.     CMP:   Recent Labs   Lab 12/22/20  0354   GLU 74   CALCIUM 7.3*   ALBUMIN 2.5*   PROT 5.4*      K 3.9   CO2 22*      BUN 42*   CREATININE 3.0*   ALKPHOS 117   ALT 9*   AST 22   BILITOT 0.3       Significant Diagnostics:  no new    Assessment/Plan:     * Septic shock  Secondary to UTI and colitis with severe dehydration.  Vitals reveal normalizes BP.  Off pressors.    Hypoglycemia  On D5 drip.  Needs nutritional supplementation.    UTI (urinary tract infection)  Antibiotics per primary  UOP monitoring    Acute renal failure  Per Nephrology recs.  Slowly improving creatinine.  UOP adequate.        Colitis  Antibiotics  Check stool for c diff  Advance diet  Likely ischemic component.  Monitor H/H        Leatha Dominguez MD  General Surgery  [facility] - ICU

## 2020-12-22 NOTE — PROGRESS NOTES
[facility] - ICU Hospital Medicine  Progress Note    Patient Name: Smiley Harmon  MRN: [id number]  Patient Class: IP- Inpatient   Admission Date: 12/19/2020  Length of Stay: 3 days  Attending Physician: Osmany Allison Jr., MD  Primary Care Provider: Yani Carlos MD        Subjective:     Principal Problem:Septic shock        HPI:  No notes on file    Overview/Hospital Course:  12/21/2020:  Patient states that overall she feels slightly better.  She has experienced an episode of hypoglycemia.  I plan to transition the patient's fluid to D5W.  She has been given an amp of bicarb.  We will continue close ICU monitoring.  She is requiring very minimal levo fed.  Will discontinue once mean arterial pressures remained stable.    12/22/2020:  No acute events overnight.  Patient endorses improvement in abdominal pain.  Now off pressors.  Still having episodes of hypoglycemia.  Continue close ICU monitoring.      Interval History: No acute events overnight.     Review of Systems   Constitutional: Positive for appetite change and fatigue.   Gastrointestinal: Positive for abdominal pain, diarrhea and nausea. Negative for blood in stool and vomiting.   Musculoskeletal: Positive for back pain.   Neurological: Positive for weakness and light-headedness.   Psychiatric/Behavioral: Positive for sleep disturbance. Negative for agitation.   All other systems reviewed and are negative.    Objective:     Vital Signs (Most Recent):  Temp: 98.4 °F (36.9 °C) (12/22/20 0820)  Pulse: 79 (12/22/20 0820)  Resp: (!) 22 (12/22/20 0820)  BP: (!) 111/55 (12/22/20 0820)  SpO2: 100 % (12/22/20 0600) Vital Signs (24h Range):  Temp:  [97.7 °F (36.5 °C)-98.4 °F (36.9 °C)] 98.4 °F (36.9 °C)  Pulse:  [65-83] 79  Resp:  [10-24] 22  SpO2:  [100 %] 100 %  BP: ()/(51-70) 111/55     Weight: 88.5 kg (195 lb 1.7 oz)  Body mass index is 31.49 kg/m².    Intake/Output Summary (Last 24 hours) at 12/22/2020 0854  Last data filed at 12/22/2020  0600  Gross per 24 hour   Intake 3156 ml   Output --   Net 3156 ml      Physical Exam  Vitals signs and nursing note reviewed.   Constitutional:       General: She is in acute distress.      Appearance: She is obese. She is ill-appearing.   HENT:      Head: Normocephalic and atraumatic.      Mouth/Throat:      Mouth: Mucous membranes are dry.      Pharynx: Oropharynx is clear.   Eyes:      Extraocular Movements: Extraocular movements intact.      Pupils: Pupils are equal, round, and reactive to light.   Neck:      Musculoskeletal: Neck supple.   Cardiovascular:      Rate and Rhythm: Normal rate and regular rhythm.      Heart sounds: Normal heart sounds. No murmur. No gallop.    Pulmonary:      Effort: Pulmonary effort is normal. No respiratory distress.      Breath sounds: Normal breath sounds. No stridor. No wheezing or rales.   Abdominal:      General: Bowel sounds are normal. There is no distension.      Palpations: Abdomen is soft.      Tenderness: There is abdominal tenderness (suprapubic and LLQ).   Musculoskeletal: Normal range of motion.         General: No swelling.   Skin:     General: Skin is warm and dry.      Coloration: Skin is pale.   Neurological:      General: No focal deficit present.      Mental Status: She is alert. Mental status is at baseline. She is disoriented.      Motor: Weakness present.   Psychiatric:         Mood and Affect: Mood normal.         Significant Labs: All pertinent labs within the past 24 hours have been reviewed.    Significant Imaging: I have reviewed and interpreted all pertinent imaging results/findings within the past 24 hours.      Assessment/Plan:      * Septic shock  This is secondary to colitis.  Current treatment includes metronidazole and ciprofloxacin.  Overall symptomatic improvement noted.    No off levophed.           Diabetes mellitus, type 2  Last A1c reviewed- No results found for: LABA1C, HGBA1C  Most recent fingerstick glucose reviewed-   Recent Labs   Lab  12/22/20  0004 12/22/20  0528 12/22/20  0530 12/22/20  0634   POCTGLUCOSE 88 27* <20*  <20* 153*     Current correctional scale  Low  Maintain anti-hyperglycemic dose as follows-   Antihyperglycemics (From admission, onward)    Start     Stop Route Frequency Ordered    12/20/20 0238  insulin aspart U-100 pen 0-5 Units      -- SubQ Every 6 hours PRN 12/20/20 0141        Hold Oral hypoglycemics while patient is in the hospital.        Hypoglycemia  Current treatment: D5W at 100 cc/h.  Will provide amp of D50 as needed.    POCT Glucose   Date Value Ref Range Status   12/22/2020 153 (H) 70 - 110 mg/dL Final   12/22/2020 <20 (L) 70 - 110 mg/dL Final   12/22/2020 <20 (L) 70 - 110 mg/dL Final   12/22/2020 27 (LL) 70 - 110 mg/dL Final   12/22/2020 88 70 - 110 mg/dL Final   12/21/2020 60 (L) 70 - 110 mg/dL Final   12/21/2020 83 70 - 110 mg/dL Final   12/21/2020 25 (LL) 70 - 110 mg/dL Final   12/21/2020 44 (LL) 70 - 110 mg/dL Final   12/21/2020 106 70 - 110 mg/dL Final   12/21/2020 59 (L) 70 - 110 mg/dL Final   12/21/2020 27 (LL) 70 - 110 mg/dL Final   12/21/2020 83 70 - 110 mg/dL Final   12/20/2020 118 (H) 70 - 110 mg/dL Final   12/20/2020 <20 (L) 70 - 110 mg/dL Final   12/20/2020 <20 (L) 70 - 110 mg/dL Final   12/20/2020 <20 (L) 70 - 110 mg/dL Final   12/20/2020 <20 (L) 70 - 110 mg/dL Final   12/20/2020 74 70 - 110 mg/dL Final   12/20/2020 52 (L) 70 - 110 mg/dL Final   12/19/2020 74 70 - 110 mg/dL Final   12/19/2020 97 70 - 110 mg/dL Final   12/19/2020 85 70 - 110 mg/dL Final   12/19/2020 138 (H) 70 - 110 mg/dL Final            UTI (urinary tract infection)  Current treatment: Ciprofloxacin.  With tailored based on culture and sensitivity.    Blood Culture, Routine   Date Value Ref Range Status   12/19/2020 No Growth to date  Preliminary   12/19/2020 No Growth to date  Preliminary   12/19/2020 No Growth to date  Preliminary     Urine Culture, Routine   Date Value Ref Range Status   12/19/2020 (A)  Preliminary    GRAM  NEGATIVE RAVI  >100,000 cfu/ml  Identification and susceptibility pending                Acute renal failure  Lab Results   Component Value Date    CREATININE 3.0 (H) 12/22/2020    CREATININE 3.2 (H) 12/21/2020    CREATININE 3.6 (H) 12/20/2020      Will continue gentle IVF.      Will consult Nephrology.      Colitis  Current treatment: Ciprofloxacin and Flagyl.        VTE Risk Mitigation (From admission, onward)         Ordered     IP VTE HIGH RISK PATIENT  Once      12/19/20 0958     Place CAROLINE hose  Until discontinued      12/19/20 0958                Discharge Planning   MARCELLE:      Code Status: Full Code   Is the patient medically ready for discharge?:     Reason for patient still in hospital (select all that apply): Patient unstable  Discharge Plan A: Home with family                  Osmany Allisno Jr, MD  Department of Hospital Medicine   [facility] - ICU

## 2020-12-22 NOTE — ASSESSMENT & PLAN NOTE
Last A1c reviewed- No results found for: LABA1C, HGBA1C  Most recent fingerstick glucose reviewed-   Recent Labs   Lab 12/22/20  0004 12/22/20  0528 12/22/20  0530 12/22/20  0634   POCTGLUCOSE 88 27* <20*  <20* 153*     Current correctional scale  Low  Maintain anti-hyperglycemic dose as follows-   Antihyperglycemics (From admission, onward)    Start     Stop Route Frequency Ordered    12/20/20 0238  insulin aspart U-100 pen 0-5 Units      -- SubQ Every 6 hours PRN 12/20/20 0141        Hold Oral hypoglycemics while patient is in the hospital.

## 2020-12-22 NOTE — SUBJECTIVE & OBJECTIVE
Interval History: No new issues.  Still some pain in RLQ.  No nausea or vomiting.     Medications:  Continuous Infusions:   norepinephrine bitartrate-D5W Stopped (12/21/20 1545)    custom IV infusion builder 100 mL/hr at 12/21/20 1400     Scheduled Meds:   ciprofloxacin (CIPRO)400mg/200ml D5W IVPB  400 mg Intravenous Q24H    gabapentin  100 mg Oral BID    metronidazole  500 mg Intravenous Q8H    montelukast  10 mg Oral QHS    mupirocin   Nasal BID    pantoprazole  40 mg Intravenous BID    sucralfate  1 g Oral QID     PRN Meds:sodium chloride, dextrose 50%, dextrose 50%, glucagon (human recombinant), glucose, glucose, insulin aspart U-100, melatonin, morphine, ondansetron, sodium chloride 0.9%     Review of patient's allergies indicates:   Allergen Reactions    Motrin [ibuprofen] Other (See Comments)     Feels like heart is swelling    Penicillins Hives and Itching    Sulfa (sulfonamide antibiotics) Nausea And Vomiting     Objective:     Vital Signs (Most Recent):  Temp: 97.8 °F (36.6 °C) (12/21/20 1515)  Pulse: 65 (12/21/20 1515)  Resp: 15 (12/21/20 1734)  BP: (!) 123/55 (12/21/20 1515)  SpO2: 100 % (12/21/20 1515) Vital Signs (24h Range):  Temp:  [97.7 °F (36.5 °C)-98.3 °F (36.8 °C)] 97.8 °F (36.6 °C)  Pulse:  [65-77] 65  Resp:  [10-29] 15  SpO2:  [100 %] 100 %  BP: ()/(52-70) 123/55     Weight: 88.5 kg (195 lb 1.7 oz)  Body mass index is 31.49 kg/m².    Intake/Output - Last 3 Shifts       12/19 0700 - 12/20 0659 12/20 0700 - 12/21 0659 12/21 0700 - 12/22 0659    P.O.  400     I.V. (mL/kg)  1635.7 (18.5) 1215 (13.7)    Blood  1071.1     IV Piggyback 2300 700 177    Total Intake(mL/kg) 2300 (26.6) 3806.8 (43) 1392 (15.7)    Stool 1      Total Output 1      Net +2299 +3806.8 +1392           Urine Occurrence  5 x 2 x    Stool Occurrence 1 x 0 x 6 x    Emesis Occurrence  0 x           Physical Exam  Vitals signs and nursing note reviewed.   Constitutional:       General: She is not in acute  distress.     Appearance: Normal appearance.   HENT:      Head: Normocephalic and atraumatic.      Mouth/Throat:      Mouth: Mucous membranes are moist.      Pharynx: Oropharynx is clear.   Eyes:      Conjunctiva/sclera: Conjunctivae normal.      Pupils: Pupils are equal, round, and reactive to light.   Neck:      Musculoskeletal: Neck supple.   Cardiovascular:      Rate and Rhythm: Normal rate and regular rhythm.      Pulses: Normal pulses.      Heart sounds: Normal heart sounds. No murmur. No gallop.    Pulmonary:      Effort: Pulmonary effort is normal. No respiratory distress.      Breath sounds: Normal breath sounds. No stridor. No wheezing, rhonchi or rales.   Abdominal:      General: Abdomen is flat. Bowel sounds are normal. There is no distension.      Palpations: Abdomen is soft.      Tenderness: There is abdominal tenderness (mild suprapubic).   Musculoskeletal: Normal range of motion.         General: No swelling.   Skin:     General: Skin is warm and dry.      Coloration: Skin is pale. Skin is not jaundiced.   Neurological:      General: No focal deficit present.      Mental Status: She is alert and oriented to person, place, and time.   Psychiatric:         Mood and Affect: Mood normal.         Behavior: Behavior normal.         Thought Content: Thought content normal.         Judgment: Judgment normal.         Significant Labs:  CBC:   Recent Labs   Lab 12/21/20  0410   WBC 11.32   RBC 3.03*   HGB 8.3*   HCT 26.5*      MCV 88   MCH 27.4   MCHC 31.3*     BMP:   Recent Labs   Lab 12/19/20  0725  12/21/20  0410 12/21/20  1228   *   < > 98 111*   *   < > 138  --    K 6.4*   < > 4.1  --       < > 112*  --    CO2 20*   < > 19*  --    BUN 57*   < > 49*  --    CREATININE 4.2*   < > 3.2*  --    CALCIUM 9.3   < > 7.0*  --    MG 3.5*  --   --   --     < > = values in this interval not displayed.     CMP:   Recent Labs   Lab 12/21/20  0410 12/21/20  1228   GLU 98 111*   CALCIUM 7.0*  --     ALBUMIN 2.3*  --    PROT 5.1*  --      --    K 4.1  --    CO2 19*  --    *  --    BUN 49*  --    CREATININE 3.2*  --    ALKPHOS 126  --    ALT 9*  --    AST 18  --    BILITOT 0.4  --        Significant Diagnostics:  no new

## 2020-12-22 NOTE — ASSESSMENT & PLAN NOTE
Secondary to UTI and colitis with severe dehydration.  Vitals reveal slightly decreased BP.  Off pressors.

## 2020-12-22 NOTE — SUBJECTIVE & OBJECTIVE
Interval History: Feels better today.  Tolerating po intake.  Minimal abdominal pain.      Medications:  Continuous Infusions:   norepinephrine bitartrate-D5W Stopped (12/21/20 1545)    sodium bicarbonate drip 100 mL/hr at 12/22/20 0835     Scheduled Meds:   ciprofloxacin (CIPRO)400mg/200ml D5W IVPB  400 mg Intravenous Q24H    gabapentin  100 mg Oral BID    metronidazole  500 mg Intravenous Q8H    montelukast  10 mg Oral QHS    mupirocin   Nasal BID    pantoprazole  40 mg Intravenous BID    sucralfate  1 g Oral QID     PRN Meds:sodium chloride, dextrose 50%, dextrose 50%, glucagon (human recombinant), glucose, glucose, HYDROcodone-acetaminophen, insulin aspart U-100, melatonin, ondansetron, sodium chloride 0.9%     Review of patient's allergies indicates:   Allergen Reactions    Motrin [ibuprofen] Other (See Comments)     Feels like heart is swelling    Penicillins Hives and Itching    Sulfa (sulfonamide antibiotics) Nausea And Vomiting     Objective:     Vital Signs (Most Recent):  Temp: 98.2 °F (36.8 °C) (12/22/20 1200)  Pulse: 79 (12/22/20 1230)  Resp: 16 (12/22/20 1230)  BP: (!) 98/54 (12/22/20 1230)  SpO2: 100 % (12/22/20 1230) Vital Signs (24h Range):  Temp:  [98 °F (36.7 °C)-98.4 °F (36.9 °C)] 98.2 °F (36.8 °C)  Pulse:  [75-92] 79  Resp:  [10-45] 16  SpO2:  [99 %-100 %] 100 %  BP: ()/(51-74) 98/54     Weight: 88.5 kg (195 lb 1.7 oz)  Body mass index is 31.49 kg/m².    Intake/Output - Last 3 Shifts       12/20 0700 - 12/21 0659 12/21 0700 - 12/22 0659 12/22 0700 - 12/23 0659    P.O. 400 300     I.V. (mL/kg) 1635.7 (18.5) 2379 (26.9)     Blood 1071.1      IV Piggyback 700 477     Total Intake(mL/kg) 3806.8 (43) 3156 (35.7)     Stool       Total Output       Net +3806.8 +3156            Urine Occurrence 5 x 2 x     Stool Occurrence 0 x 6 x 2 x    Emesis Occurrence 0 x            Physical Exam  Vitals signs and nursing note reviewed.   Constitutional:       General: She is not in acute  distress.     Appearance: Normal appearance. She is not ill-appearing.   HENT:      Head: Normocephalic and atraumatic.      Nose: Nose normal.      Mouth/Throat:      Mouth: Mucous membranes are moist.      Pharynx: Oropharynx is clear.   Eyes:      Conjunctiva/sclera: Conjunctivae normal.      Pupils: Pupils are equal, round, and reactive to light.   Neck:      Musculoskeletal: Neck supple.   Cardiovascular:      Rate and Rhythm: Normal rate and regular rhythm.      Pulses: Normal pulses.      Heart sounds: Normal heart sounds. No murmur. No gallop.    Pulmonary:      Effort: Pulmonary effort is normal. No respiratory distress.      Breath sounds: Normal breath sounds. No stridor. No wheezing, rhonchi or rales.   Abdominal:      General: Abdomen is flat. Bowel sounds are normal. There is no distension.      Palpations: Abdomen is soft.      Tenderness: There is abdominal tenderness (right lower quadrant and suprapubic). There is guarding (voluntary).   Musculoskeletal: Normal range of motion.         General: No swelling.   Skin:     General: Skin is warm and dry.      Coloration: Skin is not jaundiced or pale.   Neurological:      General: No focal deficit present.      Mental Status: She is alert and oriented to person, place, and time. Mental status is at baseline.      Motor: No weakness.   Psychiatric:         Mood and Affect: Mood normal.         Thought Content: Thought content normal.         Judgment: Judgment normal.         Significant Labs:  CBC:   Recent Labs   Lab 12/22/20  0355   WBC 7.66   RBC 2.68*   HGB 7.4*   HCT 23.1*      MCV 86   MCH 27.6   MCHC 32.0     BMP:   Recent Labs   Lab 12/19/20  0725  12/22/20  0354   *   < > 74   *   < > 140   K 6.4*   < > 3.9      < > 110   CO2 20*   < > 22*   BUN 57*   < > 42*   CREATININE 4.2*   < > 3.0*   CALCIUM 9.3   < > 7.3*   MG 3.5*  --   --     < > = values in this interval not displayed.     CMP:   Recent Labs   Lab  12/22/20  0354   GLU 74   CALCIUM 7.3*   ALBUMIN 2.5*   PROT 5.4*      K 3.9   CO2 22*      BUN 42*   CREATININE 3.0*   ALKPHOS 117   ALT 9*   AST 22   BILITOT 0.3       Significant Diagnostics:  no new

## 2020-12-22 NOTE — ASSESSMENT & PLAN NOTE
Current treatment: D5W at 100 cc/h.  Will provide amp of D50 as needed.    POCT Glucose   Date Value Ref Range Status   12/22/2020 153 (H) 70 - 110 mg/dL Final   12/22/2020 <20 (L) 70 - 110 mg/dL Final   12/22/2020 <20 (L) 70 - 110 mg/dL Final   12/22/2020 27 (LL) 70 - 110 mg/dL Final   12/22/2020 88 70 - 110 mg/dL Final   12/21/2020 60 (L) 70 - 110 mg/dL Final   12/21/2020 83 70 - 110 mg/dL Final   12/21/2020 25 (LL) 70 - 110 mg/dL Final   12/21/2020 44 (LL) 70 - 110 mg/dL Final   12/21/2020 106 70 - 110 mg/dL Final   12/21/2020 59 (L) 70 - 110 mg/dL Final   12/21/2020 27 (LL) 70 - 110 mg/dL Final   12/21/2020 83 70 - 110 mg/dL Final   12/20/2020 118 (H) 70 - 110 mg/dL Final   12/20/2020 <20 (L) 70 - 110 mg/dL Final   12/20/2020 <20 (L) 70 - 110 mg/dL Final   12/20/2020 <20 (L) 70 - 110 mg/dL Final   12/20/2020 <20 (L) 70 - 110 mg/dL Final   12/20/2020 74 70 - 110 mg/dL Final   12/20/2020 52 (L) 70 - 110 mg/dL Final   12/19/2020 74 70 - 110 mg/dL Final   12/19/2020 97 70 - 110 mg/dL Final   12/19/2020 85 70 - 110 mg/dL Final   12/19/2020 138 (H) 70 - 110 mg/dL Final

## 2020-12-23 LAB
ALBUMIN SERPL BCP-MCNC: 2.4 G/DL (ref 3.5–5.2)
ALP SERPL-CCNC: 102 U/L (ref 55–135)
ALT SERPL W/O P-5'-P-CCNC: 10 U/L (ref 10–44)
ANION GAP SERPL CALC-SCNC: 6 MMOL/L (ref 8–16)
AST SERPL-CCNC: 29 U/L (ref 10–40)
BASOPHILS # BLD AUTO: 0.06 K/UL (ref 0–0.2)
BASOPHILS NFR BLD: 1 % (ref 0–1.9)
BILIRUB SERPL-MCNC: 0.3 MG/DL (ref 0.1–1)
BLD PROD TYP BPU: NORMAL
BLD PROD TYP BPU: NORMAL
BLOOD UNIT EXPIRATION DATE: NORMAL
BLOOD UNIT EXPIRATION DATE: NORMAL
BLOOD UNIT TYPE CODE: 600
BLOOD UNIT TYPE CODE: 600
BLOOD UNIT TYPE: NORMAL
BLOOD UNIT TYPE: NORMAL
BUN SERPL-MCNC: 33 MG/DL (ref 8–23)
CALCIUM SERPL-MCNC: 7.4 MG/DL (ref 8.7–10.5)
CHLORIDE SERPL-SCNC: 107 MMOL/L (ref 95–110)
CO2 SERPL-SCNC: 26 MMOL/L (ref 23–29)
CODING SYSTEM: NORMAL
CODING SYSTEM: NORMAL
CREAT SERPL-MCNC: 2.7 MG/DL (ref 0.5–1.4)
DIFFERENTIAL METHOD: ABNORMAL
DISPENSE STATUS: NORMAL
DISPENSE STATUS: NORMAL
EOSINOPHIL # BLD AUTO: 0.3 K/UL (ref 0–0.5)
EOSINOPHIL NFR BLD: 4.3 % (ref 0–8)
ERYTHROCYTE [DISTWIDTH] IN BLOOD BY AUTOMATED COUNT: 18.6 % (ref 11.5–14.5)
EST. GFR  (AFRICAN AMERICAN): 18.8 ML/MIN/1.73 M^2
EST. GFR  (NON AFRICAN AMERICAN): 16.3 ML/MIN/1.73 M^2
GLUCOSE SERPL-MCNC: 93 MG/DL (ref 70–110)
HCT VFR BLD AUTO: 21.9 % (ref 37–48.5)
HGB BLD-MCNC: 7 G/DL (ref 12–16)
IMM GRANULOCYTES # BLD AUTO: 0.02 K/UL (ref 0–0.04)
IMM GRANULOCYTES NFR BLD AUTO: 0.3 % (ref 0–0.5)
LYMPHOCYTES # BLD AUTO: 1.4 K/UL (ref 1–4.8)
LYMPHOCYTES NFR BLD: 23.3 % (ref 18–48)
MAGNESIUM SERPL-MCNC: 1.8 MG/DL (ref 1.6–2.6)
MCH RBC QN AUTO: 27.7 PG (ref 27–31)
MCHC RBC AUTO-ENTMCNC: 32 G/DL (ref 32–36)
MCV RBC AUTO: 87 FL (ref 82–98)
MONOCYTES # BLD AUTO: 0.4 K/UL (ref 0.3–1)
MONOCYTES NFR BLD: 7.1 % (ref 4–15)
NEUTROPHILS # BLD AUTO: 3.7 K/UL (ref 1.8–7.7)
NEUTROPHILS NFR BLD: 64 % (ref 38–73)
NRBC BLD-RTO: 0 /100 WBC
NUM UNITS TRANS PACKED RBC: NORMAL
NUM UNITS TRANS PACKED RBC: NORMAL
PLATELET # BLD AUTO: 178 K/UL (ref 150–350)
PMV BLD AUTO: 11.2 FL (ref 9.2–12.9)
POCT GLUCOSE: 113 MG/DL (ref 70–110)
POCT GLUCOSE: 129 MG/DL (ref 70–110)
POCT GLUCOSE: 89 MG/DL (ref 70–110)
POCT GLUCOSE: 97 MG/DL (ref 70–110)
POTASSIUM SERPL-SCNC: 3.5 MMOL/L (ref 3.5–5.1)
PROT SERPL-MCNC: 5.1 G/DL (ref 6–8.4)
RBC # BLD AUTO: 2.53 M/UL (ref 4–5.4)
SODIUM SERPL-SCNC: 139 MMOL/L (ref 136–145)
WBC # BLD AUTO: 5.79 K/UL (ref 3.9–12.7)

## 2020-12-23 PROCEDURE — 25000003 PHARM REV CODE 250: Performed by: INTERNAL MEDICINE

## 2020-12-23 PROCEDURE — S0030 INJECTION, METRONIDAZOLE: HCPCS | Performed by: EMERGENCY MEDICINE

## 2020-12-23 PROCEDURE — 83735 ASSAY OF MAGNESIUM: CPT

## 2020-12-23 PROCEDURE — 97161 PT EVAL LOW COMPLEX 20 MIN: CPT

## 2020-12-23 PROCEDURE — 87449 NOS EACH ORGANISM AG IA: CPT

## 2020-12-23 PROCEDURE — 63600175 PHARM REV CODE 636 W HCPCS: Performed by: INTERNAL MEDICINE

## 2020-12-23 PROCEDURE — 25000003 PHARM REV CODE 250: Performed by: EMERGENCY MEDICINE

## 2020-12-23 PROCEDURE — 87324 CLOSTRIDIUM AG IA: CPT

## 2020-12-23 PROCEDURE — 99900031 HC PATIENT EDUCATION (STAT)

## 2020-12-23 PROCEDURE — C9113 INJ PANTOPRAZOLE SODIUM, VIA: HCPCS | Performed by: INTERNAL MEDICINE

## 2020-12-23 PROCEDURE — 80053 COMPREHEN METABOLIC PANEL: CPT

## 2020-12-23 PROCEDURE — 20000000 HC ICU ROOM

## 2020-12-23 PROCEDURE — 94761 N-INVAS EAR/PLS OXIMETRY MLT: CPT

## 2020-12-23 PROCEDURE — P9016 RBC LEUKOCYTES REDUCED: HCPCS

## 2020-12-23 PROCEDURE — 87493 C DIFF AMPLIFIED PROBE: CPT

## 2020-12-23 PROCEDURE — 85025 COMPLETE CBC W/AUTO DIFF WBC: CPT

## 2020-12-23 PROCEDURE — 99900035 HC TECH TIME PER 15 MIN (STAT)

## 2020-12-23 PROCEDURE — 36430 TRANSFUSION BLD/BLD COMPNT: CPT

## 2020-12-23 PROCEDURE — 36415 COLL VENOUS BLD VENIPUNCTURE: CPT

## 2020-12-23 RX ORDER — HYDROCODONE BITARTRATE AND ACETAMINOPHEN 500; 5 MG/1; MG/1
TABLET ORAL
Status: DISCONTINUED | OUTPATIENT
Start: 2020-12-23 | End: 2020-01-01 | Stop reason: HOSPADM

## 2020-12-23 RX ORDER — OXYCODONE AND ACETAMINOPHEN 10; 325 MG/1; MG/1
1 TABLET ORAL EVERY 4 HOURS PRN
Status: DISCONTINUED | OUTPATIENT
Start: 2020-12-23 | End: 2020-01-01 | Stop reason: HOSPADM

## 2020-12-23 RX ADMIN — SODIUM BICARBONATE: 84 INJECTION, SOLUTION INTRAVENOUS at 08:12

## 2020-12-23 RX ADMIN — HYDROCODONE BITARTRATE AND ACETAMINOPHEN 1 TABLET: 10; 325 TABLET ORAL at 06:12

## 2020-12-23 RX ADMIN — METRONIDAZOLE 500 MG: 500 INJECTION, SOLUTION INTRAVENOUS at 11:12

## 2020-12-23 RX ADMIN — GABAPENTIN 100 MG: 100 CAPSULE ORAL at 08:12

## 2020-12-23 RX ADMIN — MELATONIN 6 MG: 3 TAB ORAL at 09:12

## 2020-12-23 RX ADMIN — SODIUM BICARBONATE: 84 INJECTION, SOLUTION INTRAVENOUS at 10:12

## 2020-12-23 RX ADMIN — SUCRALFATE 1 G: 1 TABLET ORAL at 05:12

## 2020-12-23 RX ADMIN — CIPROFLOXACIN 400 MG: 2 INJECTION, SOLUTION INTRAVENOUS at 09:12

## 2020-12-23 RX ADMIN — SUCRALFATE 1 G: 1 TABLET ORAL at 12:12

## 2020-12-23 RX ADMIN — METRONIDAZOLE 500 MG: 500 INJECTION, SOLUTION INTRAVENOUS at 04:12

## 2020-12-23 RX ADMIN — SUCRALFATE 1 G: 1 TABLET ORAL at 08:12

## 2020-12-23 RX ADMIN — MUPIROCIN: 20 OINTMENT TOPICAL at 08:12

## 2020-12-23 RX ADMIN — METRONIDAZOLE 500 MG: 500 INJECTION, SOLUTION INTRAVENOUS at 08:12

## 2020-12-23 RX ADMIN — MONTELUKAST 10 MG: 10 TABLET, FILM COATED ORAL at 08:12

## 2020-12-23 RX ADMIN — OXYCODONE AND ACETAMINOPHEN 1 TABLET: 325; 10 TABLET ORAL at 08:12

## 2020-12-23 RX ADMIN — PANTOPRAZOLE SODIUM 40 MG: 40 INJECTION, POWDER, FOR SOLUTION INTRAVENOUS at 08:12

## 2020-12-23 RX ADMIN — OXYCODONE AND ACETAMINOPHEN 1 TABLET: 325; 10 TABLET ORAL at 09:12

## 2020-12-23 RX ADMIN — OXYCODONE AND ACETAMINOPHEN 1 TABLET: 325; 10 TABLET ORAL at 03:12

## 2020-12-23 NOTE — ASSESSMENT & PLAN NOTE
Last A1c reviewed- No results found for: LABA1C, HGBA1C  Most recent fingerstick glucose reviewed-   Recent Labs   Lab 12/22/20  1219 12/22/20  1735 12/22/20  2336 12/23/20  0529   POCTGLUCOSE 109 65* 98 89     Current correctional scale  Low  Maintain anti-hyperglycemic dose as follows-   Antihyperglycemics (From admission, onward)    Start     Stop Route Frequency Ordered    12/20/20 0238  insulin aspart U-100 pen 0-5 Units      -- SubQ Every 6 hours PRN 12/20/20 0141        Hold Oral hypoglycemics while patient is in the hospital.

## 2020-12-23 NOTE — NURSING
Attempted to place midline x 2 to patient's upper R arm but was unsuccessful. 20g R AC IV started instead @ 1000am for blood transfusion.

## 2020-12-23 NOTE — PT/OT/SLP EVAL
"Physical Therapy Evaluation    Patient Name:  Smiley Harmon   MRN:  [id number]    Recommendations:     Discharge Recommendations:  (to be determined)   Discharge Equipment Recommendations: (to be determined)   Barriers to discharge: current medical and functional status.     Assessment:     Smiley Harmon is a 80 y.o. female admitted with a medical diagnosis of Septic shock.  She presents with the following impairments/functional limitations:    decreased functional mobility and strength impacting gait, transfers, balance, coordination, safety.    Rehab Prognosis: Fair; patient would benefit from acute skilled PT services to address these deficits and reach maximum level of function.    Recent Surgery: * No surgery found *      Plan:     During this hospitalization, patient to be seen (up to 12 times per week) to address the identified rehab impairments via gait training, therapeutic activities, therapeutic exercises and progress toward the following goals:    · Plan of Care Expires:  01/07/21    Subjective     Chief Complaint: Patient complains of generalized body pains and weakness. Patient nurse stated she will be getting blood transfusion today.   Patient/Family Comments/goals: Patient agreeable to therapy assessment but does not want to stand. "I'm too weak."  Pain/Comfort:  · Pain Rating 1: 7/10  · Location - Side 1: (generalized body pains)  · Pain Addressed 1: Reposition    Patients cultural, spiritual, Yarsani conflicts given the current situation: no    Living Environment:  Patient reports lives with her son in one story home without steps.   Prior to admission, patients level of function was modified Independent.  Equipment used at home: walker, standard.   Upon discharge, patient will have assistance from son.    Objective:     Communicated with nurse prior to session.  Patient found supine with blood pressure cuff, peripheral IV, SCD, telemetry  upon PT entry to room.    General Precautions: " Standard, fall   Orthopedic Precautions:N/A   Braces: N/A     Exams:  · RLE ROM: Deficits: limited by pain/muscle insufficiency  · RLE Strength: Deficits: 2+/5  · LLE ROM: Deficits: limited by pain/muscle insufficiency  · LLE Strength: Deficits: 2+/5    Functional Mobility:  · Bed Mobility:     · Rolling Left:  total assistance  · Rolling Right: total assistance  · Supine to Sit: total assistance  · Sit to Supine: total assistance  · Transfers:     · Sit to Stand:  did not perform with n/a  · Bed to Chair: did not perform with  n/a  using  n/a  · Gait: not assessed  · Balance: Sitting balance Dependence  Patient left HOB elevated with all lines intact and call button in reach.    GOALS:   Multidisciplinary Problems     Physical Therapy Goals        Problem: Physical Therapy Goal    Goal Priority Disciplines Outcome Goal Variances Interventions   Physical Therapy Goal     PT, PT/OT      Description: Goals to be met by: 2021     Patient will increase functional independence with mobility by performing:     Supine to sit with Contact Guard Assistance   Sit to supine with Contact Guard Assistance   Rolling to Left and Right with Contact Guard Assistance.   Sit to stand transfer with Minimal Assistance   Gait  x 25 feet with Minimal Assistance using Rolling Walker.                    History:     Past Medical History:   Diagnosis Date    Blood disorder     Colitis     Coronary artery disease     Diabetes mellitus     GERD (gastroesophageal reflux disease)     Heart disease, unspecified     Memory loss     Stroke        Past Surgical History:   Procedure Laterality Date    APPENDECTOMY      BILATERAL SALPINGOOPHORECTOMY       SECTION      CHOLECYSTECTOMY      DILATION AND CURETTAGE OF UTERUS      HYSTERECTOMY  age 27    bleeding    KNEE SURGERY      SHOULDER OPEN ROTATOR CUFF REPAIR         Time Tracking:     PT Received On: 20  PT Start Time: 915     PT Stop Time: 930  PT Total Time  (min): 15 min     Billable Minutes: Evaluation 15      Laura Andrews, PT  12/23/2020

## 2020-12-23 NOTE — ASSESSMENT & PLAN NOTE
Current treatment: Ciprofloxacin.      Patient has a leukocytosis.  Last trend as follows-   Lab Results   Component Value Date    WBC 5.79 12/23/2020    WBC 7.66 12/22/2020    WBC 11.32 12/21/2020         Blood Culture, Routine   Date Value Ref Range Status   12/19/2020 No Growth to date  Preliminary   12/19/2020 No Growth to date  Preliminary   12/19/2020 No Growth to date  Preliminary   12/19/2020 No Growth to date  Preliminary     Urine Culture, Routine   Date Value Ref Range Status   12/19/2020 KLEBSIELLA PNEUMONIAE  >100,000 cfu/ml   (A)  Final

## 2020-12-23 NOTE — ASSESSMENT & PLAN NOTE
Current treatment: D5W at 100 cc/h.  Will provide amp of D50 as needed.keep chronic discomfortkeep    POCT Glucose   Date Value Ref Range Status   12/23/2020 89 70 - 110 mg/dL Final   12/22/2020 98 70 - 110 mg/dL Final   12/22/2020 65 (L) 70 - 110 mg/dL Final   12/22/2020 109 70 - 110 mg/dL Final   12/22/2020 153 (H) 70 - 110 mg/dL Final   12/22/2020 <20 (L) 70 - 110 mg/dL Final   12/22/2020 <20 (L) 70 - 110 mg/dL Final   12/22/2020 27 (LL) 70 - 110 mg/dL Final   12/22/2020 88 70 - 110 mg/dL Final   12/21/2020 60 (L) 70 - 110 mg/dL Final   12/21/2020 83 70 - 110 mg/dL Final   12/21/2020 25 (LL) 70 - 110 mg/dL Final   12/21/2020 44 (LL) 70 - 110 mg/dL Final   12/21/2020 106 70 - 110 mg/dL Final   12/21/2020 59 (L) 70 - 110 mg/dL Final   12/21/2020 27 (LL) 70 - 110 mg/dL Final   12/21/2020 83 70 - 110 mg/dL Final   12/20/2020 118 (H) 70 - 110 mg/dL Final   12/20/2020 <20 (L) 70 - 110 mg/dL Final   12/20/2020 <20 (L) 70 - 110 mg/dL Final   12/20/2020 <20 (L) 70 - 110 mg/dL Final   12/20/2020 <20 (L) 70 - 110 mg/dL Final   12/20/2020 74 70 - 110 mg/dL Final

## 2020-12-23 NOTE — NURSING
Patient tolerated [facility] soft diet for breakfast, patient ate waffle and scrambled eggs, did not complain of abd pain while eating or afterwards. Will cont to monitor.

## 2020-12-23 NOTE — PLAN OF CARE
Problem: Adult Inpatient Plan of Care  Goal: Plan of Care Review  Outcome: Ongoing, Progressing    Patient receiving 2 units of blood today for anemia. Patient still having large bowel movements. Patient remained afebrile. Patient remains off of levophed. Vitals stable. Patient does complain of abd pain, relieved by oral medication. pt did tolerate advancement to mech soft diet. Will cont to monitor.

## 2020-12-23 NOTE — ASSESSMENT & PLAN NOTE
Lab Results   Component Value Date    CREATININE 2.7 (H) 12/23/2020    CREATININE 3.0 (H) 12/22/2020    CREATININE 3.2 (H) 12/21/2020      Will continue gentle IVF.      Will consult Nephrology.

## 2020-12-23 NOTE — PROGRESS NOTES
[facility] Hospital Medicine  Progress Note    Patient Name: Smiley Harmon  MRN: [id number]  Patient Class: IP- Inpatient   Admission Date: 12/19/2020  Length of Stay: 4 days  Attending Physician: Osmany Allison Jr., MD  Primary Care Provider: Yani Carlos MD        Subjective:     Principal Problem:Septic shock        HPI:  No notes on file    Overview/Hospital Course:  12/21/2020:  Patient states that overall she feels slightly better.  She has experienced an episode of hypoglycemia.  I plan to transition the patient's fluid to D5W.  She has been given an amp of bicarb.  We will continue close ICU monitoring.  She is requiring very minimal levo fed.  Will discontinue once mean arterial pressures remained stable.    12/22/2020:  No acute events overnight.  Patient endorses improvement in abdominal pain.  Now off pressors.  Still having episodes of hypoglycemia.  Continue close ICU monitoring.      12/23/2020: No acute events overnight.  Patient is more anemic this morning and still having some bloody bowel movements.  Will provide 2 units packed red blood cells and continue inpatient monitoring.  Her hypoglycemia appears to be improving.    Interval History: a few bloody stools overnight    Review of Systems   Constitutional: Positive for appetite change and fatigue.   Gastrointestinal: Positive for abdominal pain, diarrhea and nausea. Negative for blood in stool and vomiting.   Musculoskeletal: Positive for back pain.   Neurological: Positive for weakness and light-headedness.   Psychiatric/Behavioral: Positive for sleep disturbance. Negative for agitation.   All other systems reviewed and are negative.    Objective:     Vital Signs (Most Recent):  Temp: 98.3 °F (36.8 °C) (12/23/20 0745)  Pulse: 81 (12/23/20 0845)  Resp: (!) 22 (12/23/20 0845)  BP: (!) 115/56 (12/23/20 0845)  SpO2: 100 % (12/23/20 0845) Vital Signs (24h Range):  Temp:  [98.1 °F (36.7 °C)-98.6 °F (37 °C)] 98.3 °F (36.8 °C)  Pulse:   [74-86] 81  Resp:  [10-72] 22  SpO2:  [99 %-100 %] 100 %  BP: ()/(51-81) 115/56     Weight: 88.5 kg (195 lb 1.7 oz)  Body mass index is 31.49 kg/m².    Intake/Output Summary (Last 24 hours) at 12/23/2020 0904  Last data filed at 12/23/2020 0600  Gross per 24 hour   Intake 3120 ml   Output --   Net 3120 ml      Physical Exam  Vitals signs and nursing note reviewed.   Constitutional:       General: She is in acute distress.      Appearance: She is obese. She is ill-appearing.   HENT:      Head: Normocephalic and atraumatic.      Mouth/Throat:      Mouth: Mucous membranes are dry.      Pharynx: Oropharynx is clear.   Eyes:      Extraocular Movements: Extraocular movements intact.      Pupils: Pupils are equal, round, and reactive to light.   Neck:      Musculoskeletal: Neck supple.   Cardiovascular:      Rate and Rhythm: Normal rate and regular rhythm.      Heart sounds: Normal heart sounds. No murmur. No gallop.    Pulmonary:      Effort: Pulmonary effort is normal. No respiratory distress.      Breath sounds: Normal breath sounds. No stridor. No wheezing or rales.   Abdominal:      General: Bowel sounds are normal. There is no distension.      Palpations: Abdomen is soft.      Tenderness: There is abdominal tenderness (suprapubic and LLQ).   Musculoskeletal: Normal range of motion.         General: No swelling.   Skin:     General: Skin is warm and dry.      Coloration: Skin is pale.   Neurological:      General: No focal deficit present.      Mental Status: She is alert. Mental status is at baseline. She is disoriented.      Motor: Weakness present.   Psychiatric:         Mood and Affect: Mood normal.         Significant Labs: All pertinent labs within the past 24 hours have been reviewed.    Significant Imaging: I have reviewed and interpreted all pertinent imaging results/findings within the past 24 hours.      Assessment/Plan:      * Septic shock  This is secondary to colitis.  Current treatment includes  metronidazole and ciprofloxacin.  Overall symptomatic improvement noted.    No off levophed.           Diabetes mellitus, type 2  Last A1c reviewed- No results found for: LABA1C, HGBA1C  Most recent fingerstick glucose reviewed-   Recent Labs   Lab 12/22/20  1219 12/22/20  1735 12/22/20  2336 12/23/20  0529   POCTGLUCOSE 109 65* 98 89     Current correctional scale  Low  Maintain anti-hyperglycemic dose as follows-   Antihyperglycemics (From admission, onward)    Start     Stop Route Frequency Ordered    12/20/20 0238  insulin aspart U-100 pen 0-5 Units      -- SubQ Every 6 hours PRN 12/20/20 0141        Hold Oral hypoglycemics while patient is in the hospital.        Hypoglycemia  Current treatment: D5W at 100 cc/h.  Will provide amp of D50 as needed.keep chronic discomfortkeep    POCT Glucose   Date Value Ref Range Status   12/23/2020 89 70 - 110 mg/dL Final   12/22/2020 98 70 - 110 mg/dL Final   12/22/2020 65 (L) 70 - 110 mg/dL Final   12/22/2020 109 70 - 110 mg/dL Final   12/22/2020 153 (H) 70 - 110 mg/dL Final   12/22/2020 <20 (L) 70 - 110 mg/dL Final   12/22/2020 <20 (L) 70 - 110 mg/dL Final   12/22/2020 27 (LL) 70 - 110 mg/dL Final   12/22/2020 88 70 - 110 mg/dL Final   12/21/2020 60 (L) 70 - 110 mg/dL Final   12/21/2020 83 70 - 110 mg/dL Final   12/21/2020 25 (LL) 70 - 110 mg/dL Final   12/21/2020 44 (LL) 70 - 110 mg/dL Final   12/21/2020 106 70 - 110 mg/dL Final   12/21/2020 59 (L) 70 - 110 mg/dL Final   12/21/2020 27 (LL) 70 - 110 mg/dL Final   12/21/2020 83 70 - 110 mg/dL Final   12/20/2020 118 (H) 70 - 110 mg/dL Final   12/20/2020 <20 (L) 70 - 110 mg/dL Final   12/20/2020 <20 (L) 70 - 110 mg/dL Final   12/20/2020 <20 (L) 70 - 110 mg/dL Final   12/20/2020 <20 (L) 70 - 110 mg/dL Final   12/20/2020 74 70 - 110 mg/dL Final            UTI (urinary tract infection)  Current treatment: Ciprofloxacin.      Patient has a leukocytosis.  Last trend as follows-   Lab Results   Component Value Date    WBC 5.79  12/23/2020    WBC 7.66 12/22/2020    WBC 11.32 12/21/2020         Blood Culture, Routine   Date Value Ref Range Status   12/19/2020 No Growth to date  Preliminary   12/19/2020 No Growth to date  Preliminary   12/19/2020 No Growth to date  Preliminary   12/19/2020 No Growth to date  Preliminary     Urine Culture, Routine   Date Value Ref Range Status   12/19/2020 KLEBSIELLA PNEUMONIAE  >100,000 cfu/ml   (A)  Final              Acute renal failure  Lab Results   Component Value Date    CREATININE 2.7 (H) 12/23/2020    CREATININE 3.0 (H) 12/22/2020    CREATININE 3.2 (H) 12/21/2020      Will continue gentle IVF.      Will consult Nephrology.      Colitis  Current treatment: Ciprofloxacin and Flagyl.        VTE Risk Mitigation (From admission, onward)         Ordered     IP VTE HIGH RISK PATIENT  Once      12/19/20 0958     Place CAROLINE hose  Until discontinued      12/19/20 0958                Discharge Planning   MARCELLE:      Code Status: Full Code   Is the patient medically ready for discharge?:     Reason for patient still in hospital (select all that apply): Treatment  Discharge Plan A: Home with family                  Osmany Allison Jr, MD  Department of Hospital Medicine   [facility]

## 2020-12-23 NOTE — SUBJECTIVE & OBJECTIVE
Interval History: a few bloody stools overnight    Review of Systems   Constitutional: Positive for appetite change and fatigue.   Gastrointestinal: Positive for abdominal pain, diarrhea and nausea. Negative for blood in stool and vomiting.   Musculoskeletal: Positive for back pain.   Neurological: Positive for weakness and light-headedness.   Psychiatric/Behavioral: Positive for sleep disturbance. Negative for agitation.   All other systems reviewed and are negative.    Objective:     Vital Signs (Most Recent):  Temp: 98.3 °F (36.8 °C) (12/23/20 0745)  Pulse: 81 (12/23/20 0845)  Resp: (!) 22 (12/23/20 0845)  BP: (!) 115/56 (12/23/20 0845)  SpO2: 100 % (12/23/20 0845) Vital Signs (24h Range):  Temp:  [98.1 °F (36.7 °C)-98.6 °F (37 °C)] 98.3 °F (36.8 °C)  Pulse:  [74-86] 81  Resp:  [10-72] 22  SpO2:  [99 %-100 %] 100 %  BP: ()/(51-81) 115/56     Weight: 88.5 kg (195 lb 1.7 oz)  Body mass index is 31.49 kg/m².    Intake/Output Summary (Last 24 hours) at 12/23/2020 0904  Last data filed at 12/23/2020 0600  Gross per 24 hour   Intake 3120 ml   Output --   Net 3120 ml      Physical Exam  Vitals signs and nursing note reviewed.   Constitutional:       General: She is in acute distress.      Appearance: She is obese. She is ill-appearing.   HENT:      Head: Normocephalic and atraumatic.      Mouth/Throat:      Mouth: Mucous membranes are dry.      Pharynx: Oropharynx is clear.   Eyes:      Extraocular Movements: Extraocular movements intact.      Pupils: Pupils are equal, round, and reactive to light.   Neck:      Musculoskeletal: Neck supple.   Cardiovascular:      Rate and Rhythm: Normal rate and regular rhythm.      Heart sounds: Normal heart sounds. No murmur. No gallop.    Pulmonary:      Effort: Pulmonary effort is normal. No respiratory distress.      Breath sounds: Normal breath sounds. No stridor. No wheezing or rales.   Abdominal:      General: Bowel sounds are normal. There is no distension.       Palpations: Abdomen is soft.      Tenderness: There is abdominal tenderness (suprapubic and LLQ).   Musculoskeletal: Normal range of motion.         General: No swelling.   Skin:     General: Skin is warm and dry.      Coloration: Skin is pale.   Neurological:      General: No focal deficit present.      Mental Status: She is alert. Mental status is at baseline. She is disoriented.      Motor: Weakness present.   Psychiatric:         Mood and Affect: Mood normal.         Significant Labs: All pertinent labs within the past 24 hours have been reviewed.    Significant Imaging: I have reviewed and interpreted all pertinent imaging results/findings within the past 24 hours.

## 2020-12-24 LAB
ALBUMIN SERPL BCP-MCNC: 2.7 G/DL (ref 3.5–5.2)
ALP SERPL-CCNC: 108 U/L (ref 55–135)
ALT SERPL W/O P-5'-P-CCNC: 12 U/L (ref 10–44)
ANION GAP SERPL CALC-SCNC: 4 MMOL/L (ref 8–16)
AST SERPL-CCNC: 46 U/L (ref 10–40)
BACTERIA BLD CULT: NORMAL
BACTERIA BLD CULT: NORMAL
BASOPHILS # BLD AUTO: 0.06 K/UL (ref 0–0.2)
BASOPHILS NFR BLD: 0.6 % (ref 0–1.9)
BILIRUB SERPL-MCNC: 0.3 MG/DL (ref 0.1–1)
BUN SERPL-MCNC: 26 MG/DL (ref 8–23)
C DIFF GDH STL QL: POSITIVE
C DIFF TOX A+B STL QL IA: NEGATIVE
C DIFF TOX GENS STL QL NAA+PROBE: POSITIVE
CALCIUM SERPL-MCNC: 7.9 MG/DL (ref 8.7–10.5)
CHLORIDE SERPL-SCNC: 103 MMOL/L (ref 95–110)
CO2 SERPL-SCNC: 31 MMOL/L (ref 23–29)
CREAT SERPL-MCNC: 2.4 MG/DL (ref 0.5–1.4)
DIFFERENTIAL METHOD: ABNORMAL
EOSINOPHIL # BLD AUTO: 0.5 K/UL (ref 0–0.5)
EOSINOPHIL NFR BLD: 5 % (ref 0–8)
ERYTHROCYTE [DISTWIDTH] IN BLOOD BY AUTOMATED COUNT: 17.1 % (ref 11.5–14.5)
EST. GFR  (AFRICAN AMERICAN): 21.6 ML/MIN/1.73 M^2
EST. GFR  (NON AFRICAN AMERICAN): 18.8 ML/MIN/1.73 M^2
GLUCOSE SERPL-MCNC: 116 MG/DL (ref 70–110)
GPP - ADENOVIRUS 40/41: NOT DETECTED
GPP - CAMPYLOBACTER: NOT DETECTED
GPP - CRYPTOSPORIDIUM: NOT DETECTED
GPP - E COLI O157: NOT DETECTED
GPP - ENTAMOEBA HISTOLYTICA: NOT DETECTED
GPP - ENTEROTOXIGENIC E COLI (ETEC): NOT DETECTED
GPP - GIARDIA LAMBLIA: NOT DETECTED
GPP - NOROVIRUS GI/GII: NOT DETECTED
GPP - ROTAVIRUS A: NOT DETECTED
GPP - SALMONELLA: NOT DETECTED
GPP - SHIGELLA: NOT DETECTED
GPP - VIBRIO CHOLERA: NOT DETECTED
GPP - YERSINIA ENTEROCOLITICA: NOT DETECTED
HCT VFR BLD AUTO: 33.7 % (ref 37–48.5)
HGB BLD-MCNC: 11.4 G/DL (ref 12–16)
IMM GRANULOCYTES # BLD AUTO: 0.04 K/UL (ref 0–0.04)
IMM GRANULOCYTES NFR BLD AUTO: 0.4 % (ref 0–0.5)
LACTATE PLASV-SCNC: NOT DETECTED MMOL/L
LYMPHOCYTES # BLD AUTO: 1.3 K/UL (ref 1–4.8)
LYMPHOCYTES NFR BLD: 13.4 % (ref 18–48)
MCH RBC QN AUTO: 29.2 PG (ref 27–31)
MCHC RBC AUTO-ENTMCNC: 33.8 G/DL (ref 32–36)
MCV RBC AUTO: 86 FL (ref 82–98)
MONOCYTES # BLD AUTO: 0.7 K/UL (ref 0.3–1)
MONOCYTES NFR BLD: 7.2 % (ref 4–15)
NEUTROPHILS # BLD AUTO: 6.9 K/UL (ref 1.8–7.7)
NEUTROPHILS NFR BLD: 73.4 % (ref 38–73)
NRBC BLD-RTO: 0 /100 WBC
PLATELET # BLD AUTO: 168 K/UL (ref 150–350)
PMV BLD AUTO: 11.1 FL (ref 9.2–12.9)
POCT GLUCOSE: 107 MG/DL (ref 70–110)
POCT GLUCOSE: 111 MG/DL (ref 70–110)
POCT GLUCOSE: 121 MG/DL (ref 70–110)
POCT GLUCOSE: 132 MG/DL (ref 70–110)
POTASSIUM SERPL-SCNC: 3.4 MMOL/L (ref 3.5–5.1)
PROT SERPL-MCNC: 5.8 G/DL (ref 6–8.4)
RBC # BLD AUTO: 3.91 M/UL (ref 4–5.4)
SODIUM SERPL-SCNC: 138 MMOL/L (ref 136–145)
WBC # BLD AUTO: 9.35 K/UL (ref 3.9–12.7)

## 2020-12-24 PROCEDURE — 25000003 PHARM REV CODE 250: Performed by: EMERGENCY MEDICINE

## 2020-12-24 PROCEDURE — 63600175 PHARM REV CODE 636 W HCPCS: Performed by: INTERNAL MEDICINE

## 2020-12-24 PROCEDURE — C9113 INJ PANTOPRAZOLE SODIUM, VIA: HCPCS | Performed by: INTERNAL MEDICINE

## 2020-12-24 PROCEDURE — 25000003 PHARM REV CODE 250: Performed by: INTERNAL MEDICINE

## 2020-12-24 PROCEDURE — 94761 N-INVAS EAR/PLS OXIMETRY MLT: CPT

## 2020-12-24 PROCEDURE — 97110 THERAPEUTIC EXERCISES: CPT

## 2020-12-24 PROCEDURE — S0030 INJECTION, METRONIDAZOLE: HCPCS | Performed by: EMERGENCY MEDICINE

## 2020-12-24 PROCEDURE — 11000001 HC ACUTE MED/SURG PRIVATE ROOM

## 2020-12-24 PROCEDURE — 85025 COMPLETE CBC W/AUTO DIFF WBC: CPT

## 2020-12-24 PROCEDURE — 36415 COLL VENOUS BLD VENIPUNCTURE: CPT

## 2020-12-24 PROCEDURE — 80053 COMPREHEN METABOLIC PANEL: CPT

## 2020-12-24 PROCEDURE — 99900035 HC TECH TIME PER 15 MIN (STAT)

## 2020-12-24 RX ORDER — SODIUM CHLORIDE AND POTASSIUM CHLORIDE 150; 900 MG/100ML; MG/100ML
INJECTION, SOLUTION INTRAVENOUS CONTINUOUS
Status: DISCONTINUED | OUTPATIENT
Start: 2020-12-24 | End: 2020-01-01

## 2020-12-24 RX ADMIN — MONTELUKAST 10 MG: 10 TABLET, FILM COATED ORAL at 08:12

## 2020-12-24 RX ADMIN — METRONIDAZOLE 500 MG: 500 INJECTION, SOLUTION INTRAVENOUS at 11:12

## 2020-12-24 RX ADMIN — SODIUM BICARBONATE: 84 INJECTION, SOLUTION INTRAVENOUS at 07:12

## 2020-12-24 RX ADMIN — SUCRALFATE 1 G: 1 TABLET ORAL at 08:12

## 2020-12-24 RX ADMIN — METRONIDAZOLE 500 MG: 500 INJECTION, SOLUTION INTRAVENOUS at 03:12

## 2020-12-24 RX ADMIN — GABAPENTIN 100 MG: 100 CAPSULE ORAL at 08:12

## 2020-12-24 RX ADMIN — MUPIROCIN: 20 OINTMENT TOPICAL at 09:12

## 2020-12-24 RX ADMIN — VANCOMYCIN HYDROCHLORIDE 125 MG: KIT at 05:12

## 2020-12-24 RX ADMIN — PANTOPRAZOLE SODIUM 40 MG: 40 INJECTION, POWDER, FOR SOLUTION INTRAVENOUS at 08:12

## 2020-12-24 RX ADMIN — MUPIROCIN: 20 OINTMENT TOPICAL at 08:12

## 2020-12-24 RX ADMIN — OXYCODONE AND ACETAMINOPHEN 1 TABLET: 325; 10 TABLET ORAL at 12:12

## 2020-12-24 RX ADMIN — SUCRALFATE 1 G: 1 TABLET ORAL at 12:12

## 2020-12-24 RX ADMIN — OXYCODONE AND ACETAMINOPHEN 1 TABLET: 325; 10 TABLET ORAL at 02:12

## 2020-12-24 RX ADMIN — OXYCODONE AND ACETAMINOPHEN 1 TABLET: 325; 10 TABLET ORAL at 06:12

## 2020-12-24 RX ADMIN — POTASSIUM CHLORIDE AND SODIUM CHLORIDE: 900; 150 INJECTION, SOLUTION INTRAVENOUS at 05:12

## 2020-12-24 RX ADMIN — METRONIDAZOLE 500 MG: 500 INJECTION, SOLUTION INTRAVENOUS at 07:12

## 2020-12-24 RX ADMIN — OXYCODONE AND ACETAMINOPHEN 1 TABLET: 325; 10 TABLET ORAL at 08:12

## 2020-12-24 RX ADMIN — VANCOMYCIN HYDROCHLORIDE 125 MG: KIT at 12:12

## 2020-12-24 RX ADMIN — SUCRALFATE 1 G: 1 TABLET ORAL at 05:12

## 2020-12-24 NOTE — ASSESSMENT & PLAN NOTE
Antibiotics  Check stool for c diff  Advance diet  Likely ischemic component.  Monitor H/H.  Agree with transfusion

## 2020-12-24 NOTE — PROGRESS NOTES
"[facility]  Nephrology  Progress Note    Patient Name: Smiley Harmon  MRN: [id number]  Admission Date: 12/19/2020  Hospital Length of Stay: 5 days  Attending Provider: Osmany Allison Jr., MD   Primary Care Physician: Yani Carlos MD  Principal Problem:Septic shock    Subjective:     HPI:   Fatigue        Per EMS, Fly reports a rough night with pt complaining of back pain, and abdominal pain with an episode of diarrhea after a period of constipation. EMS reports patient to be very lethargic and altered. Pt answers questions appropriately at time of triage.    Back Pain    Abdominal Pain      This is a 80-year-old white female history of chronic pain, colitis, diabetes mellitus, presents to the emergency department with back pain and diarrhea after constipation.  EMS reports blood pressure was low upon arrival to the patient's house, but his increased since starting fluids    Pt. Seen in ICU- reports diarrhea is better buyt has been going on for " a while" according to pt.  She denies any N/V or diarrhea @ present, also states she has had recent UTI.    Pt. Stable overnight- nurse reports + C. Diff infection.  Pt. Without c/o and states she is tolerating oral intake.    Interval History:  Pt. Stable- in ICU    Review of patient's allergies indicates:   Allergen Reactions    Motrin [ibuprofen] Other (See Comments)     Feels like heart is swelling    Penicillins Hives and Itching    Sulfa (sulfonamide antibiotics) Nausea And Vomiting     Current Facility-Administered Medications   Medication Frequency    0.9%  NaCl infusion (for blood administration) Q24H PRN    0.9%  NaCl infusion (for blood administration) Q24H PRN    dextrose 50% injection 12.5 g PRN    dextrose 50% injection 25 g PRN    gabapentin capsule 100 mg BID    glucagon (human recombinant) injection 1 mg PRN    insulin aspart U-100 pen 0-5 Units Q6H PRN    melatonin tablet 6 mg Nightly PRN    metronidazole IVPB 500 mg Q8H "    montelukast tablet 10 mg QHS    mupirocin 2 % ointment BID    ondansetron injection 4 mg Q8H PRN    oxyCODONE-acetaminophen  mg per tablet 1 tablet Q4H PRN    pantoprazole injection 40 mg BID    sodium bicarbonate 100 mEq in dextrose 5 % 1,000 mL infusion Continuous    sodium chloride 0.9% flush 10 mL PRN    sucralfate tablet 1 g QID    vancomycin 25 mg/mL oral solution 125 mg Q6H       Objective:     Vital Signs (Most Recent):  Temp: 97.8 °F (36.6 °C) (12/24/20 1326)  Pulse: 75 (12/24/20 1326)  Resp: 18 (12/24/20 1326)  BP: (!) 189/88 (12/24/20 1326)  SpO2: 98 % (12/24/20 1326)  O2 Device (Oxygen Therapy): room air (12/24/20 1326) Vital Signs (24h Range):  Temp:  [97.8 °F (36.6 °C)-98.7 °F (37.1 °C)] 97.8 °F (36.6 °C)  Pulse:  [73-90] 75  Resp:  [13-28] 18  SpO2:  [83 %-100 %] 98 %  BP: (122-189)/() 189/88     Weight: 88.5 kg (195 lb 1.7 oz) (12/21/20 0600)  Body mass index is 31.49 kg/m².  Body surface area is 2.03 meters squared.    I/O last 3 completed shifts:  In: 5658 [P.O.:960; I.V.:3593; Blood:605; IV Piggyback:500]  Out: -     Physical Exam  Vitals signs reviewed.   Constitutional:       Appearance: Normal appearance.   HENT:      Head: Normocephalic and atraumatic.   Cardiovascular:      Rate and Rhythm: Normal rate and regular rhythm.      Pulses: Normal pulses.      Heart sounds: Normal heart sounds.   Pulmonary:      Effort: Pulmonary effort is normal.      Breath sounds: Normal breath sounds.   Abdominal:      General: Bowel sounds are normal.      Palpations: Abdomen is soft.   Musculoskeletal:         General: No swelling.   Skin:     General: Skin is warm and dry.   Neurological:      General: No focal deficit present.      Mental Status: She is alert and oriented to person, place, and time. Mental status is at baseline.   Psychiatric:         Mood and Affect: Mood normal.         Behavior: Behavior normal.         Thought Content: Thought content normal.         Significant  Labs:  Cardiac Markers: No results for input(s): CKMB, TROPONINT, MYOGLOBIN in the last 168 hours.  CBC:   Recent Labs   Lab 12/24/20  0340   WBC 9.35   RBC 3.91*   HGB 11.4*   HCT 33.7*      MCV 86   MCH 29.2   MCHC 33.8     CMP:   Recent Labs   Lab 12/24/20  0340   *   CALCIUM 7.9*   ALBUMIN 2.7*   PROT 5.8*      K 3.4*   CO2 31*      BUN 26*   CREATININE 2.4*   ALKPHOS 108   ALT 12   AST 46*   BILITOT 0.3     Recent Labs   Lab 12/19/20  0748   COLORU Yellow   SPECGRAV 1.010   PHUR 6.0   PROTEINUA 1+*   BACTERIA Many*   NITRITE Negative   LEUKOCYTESUR 3+*   UROBILINOGEN Negative   HYALINECASTS 0     All labs within the past 24 hours have been reviewed.     Significant Imaging:  Labs: Reviewed  X-Ray: Reviewed  CT: Reviewed    Assessment/Plan:     Acute renal failure  JOSE A- improving creat.- cont IV fluids and monitor labs.  Will follow intake/ urine output.  Cont Abx. Coverage for UTI with Cipro.  Will change IVF lfuids to 1/2 NS with sodium bicarbonate to correct metabolic acidosis.    Pt. Stable with improving JOSE A- will d/c bicarbonate drip and change to NS with KCL to correct hypokalemia.        Thank you for your consult. I will follow-up with patient. Please contact us if you have any additional questions.    Macarena Trejo MD  Nephrology  [facility]

## 2020-12-24 NOTE — PLAN OF CARE
Pt c/o sharp, right-sided abdominal pain. Oral pain med administered. C dif test resulted positive. No bowel movements noted for this shift. Vitals stable. Pt said she did not sleep and would like something stronger for sleep tonight.

## 2020-12-24 NOTE — ASSESSMENT & PLAN NOTE
JOSE A- improving creat.- cont IV fluids and monitor labs.  Will follow intake/ urine output.  Cont Abx. Coverage for UTI with Cipro.  Will change IVF lfuids to 1/2 NS with sodium bicarbonate to correct metabolic acidosis.    Pt. Stable with improving JOSE A- will d/c bicarbonate drip and change to NS with KCL to correct hypokalemia.

## 2020-12-24 NOTE — PROGRESS NOTES
[facility]  General Surgery  Progress Note  12/23/20    Subjective:     History of Present Illness:  Brought to ER for diarrhea and decreased responsiveness.      Post-Op Info:  * No surgery found *         Interval History: Feels better today.  Tolerating po intake.  Minimal abdominal pain.  Still with bloody BM    Medications:  Continuous Infusions:   sodium bicarbonate drip 100 mL/hr at 12/23/20 2005     Scheduled Meds:   ciprofloxacin (CIPRO)400mg/200ml D5W IVPB  400 mg Intravenous Q24H    gabapentin  100 mg Oral BID    metronidazole  500 mg Intravenous Q8H    montelukast  10 mg Oral QHS    mupirocin   Nasal BID    pantoprazole  40 mg Intravenous BID    sucralfate  1 g Oral QID     PRN Meds:sodium chloride, sodium chloride, dextrose 50%, dextrose 50%, glucagon (human recombinant), glucose, glucose, insulin aspart U-100, melatonin, ondansetron, oxyCODONE-acetaminophen, sodium chloride 0.9%     Review of patient's allergies indicates:   Allergen Reactions    Motrin [ibuprofen] Other (See Comments)     Feels like heart is swelling    Penicillins Hives and Itching    Sulfa (sulfonamide antibiotics) Nausea And Vomiting     Objective:     Vital Signs (Most Recent):  Temp: 98.5 °F (36.9 °C) (12/23/20 1929)  Pulse: 80 (12/23/20 2200)  Resp: (!) 28 (12/23/20 2200)  BP: (!) 188/88 (12/23/20 2200)  SpO2: 100 % (12/23/20 2200) Vital Signs (24h Range):  Temp:  [97.7 °F (36.5 °C)-98.7 °F (37.1 °C)] 98.5 °F (36.9 °C)  Pulse:  [74-90] 80  Resp:  [12-36] 28  SpO2:  [75 %-100 %] 100 %  BP: ()/() 188/88     Weight: 88.5 kg (195 lb 1.7 oz)  Body mass index is 31.49 kg/m².    Intake/Output - Last 3 Shifts       12/21 0700 - 12/22 0659 12/22 0700 - 12/23 0659 12/23 0700 - 12/24 0659    P.O. 300 687 600    I.V. (mL/kg) 2379 (26.9) 2133 (24.1) 1325 (15)    Blood   605    IV Piggyback 477 300 200    Total Intake(mL/kg) 3156 (35.7) 3120 (35.3) 2730 (30.8)    Net +3156 +3120 +2730           Urine  Occurrence 2 x 6 x 3 x    Stool Occurrence 6 x 7 x           Physical Exam  Vitals signs and nursing note reviewed.   Constitutional:       General: She is not in acute distress.     Appearance: Normal appearance. She is not ill-appearing.   HENT:      Head: Normocephalic and atraumatic.      Nose: Nose normal.      Mouth/Throat:      Mouth: Mucous membranes are moist.      Pharynx: Oropharynx is clear.   Eyes:      Conjunctiva/sclera: Conjunctivae normal.      Pupils: Pupils are equal, round, and reactive to light.   Neck:      Musculoskeletal: Neck supple.   Cardiovascular:      Rate and Rhythm: Normal rate and regular rhythm.      Pulses: Normal pulses.      Heart sounds: Normal heart sounds. No murmur. No gallop.    Pulmonary:      Effort: Pulmonary effort is normal. No respiratory distress.      Breath sounds: Normal breath sounds. No stridor. No wheezing, rhonchi or rales.   Abdominal:      General: Abdomen is flat. Bowel sounds are normal. There is no distension.      Palpations: Abdomen is soft.      Tenderness: There is abdominal tenderness (right lower quadrant and suprapubic). There is guarding (voluntary).   Musculoskeletal: Normal range of motion.         General: No swelling.   Skin:     General: Skin is warm and dry.      Coloration: Skin is not jaundiced or pale.   Neurological:      General: No focal deficit present.      Mental Status: She is alert and oriented to person, place, and time. Mental status is at baseline.      Motor: No weakness.   Psychiatric:         Mood and Affect: Mood normal.         Thought Content: Thought content normal.         Judgment: Judgment normal.         Significant Labs:  CBC:   Recent Labs   Lab 12/23/20 0409   WBC 5.79   RBC 2.53*   HGB 7.0*   HCT 21.9*      MCV 87   MCH 27.7   MCHC 32.0     BMP:   Recent Labs   Lab 12/23/20 0409 12/23/20 2005   GLU 93  --      --    K 3.5  --      --    CO2 26  --    BUN 33*  --    CREATININE 2.7*  --     CALCIUM 7.4*  --    MG  --  1.8     CMP:   Recent Labs   Lab 12/23/20  0409   GLU 93   CALCIUM 7.4*   ALBUMIN 2.4*   PROT 5.1*      K 3.5   CO2 26      BUN 33*   CREATININE 2.7*   ALKPHOS 102   ALT 10   AST 29   BILITOT 0.3       Significant Diagnostics:  no new    Assessment/Plan:     * Septic shock  Secondary to UTI and colitis with severe dehydration.  Vitals reveal normalizes BP.   Od BP.  Off pressors.    Hypoglycemia  On D5 drip.  Needs nutritional supplementation.    UTI (urinary tract infection)  Antibiotics per primary  UOP monitoring    Acute renal failure  Per Nephrology recs.  Slowly improving creatinine.  UOP adequate.        Colitis  Antibiotics  Check stool for c diff  Advance diet  Likely ischemic component.  Monitor H/H.  Agree with transfusion        Leatha Dominguez MD  General Surgery  [facility]

## 2020-12-24 NOTE — PROGRESS NOTES
Principal Problem:Septic shock           HPI:  No notes on file     Overview/Hospital Course:  12/21/2020:  Patient states that overall she feels slightly better.  She has experienced an episode of hypoglycemia.  I plan to transition the patient's fluid to D5W.  She has been given an amp of bicarb.  We will continue close ICU monitoring.  She is requiring very minimal levo fed.  Will discontinue once mean arterial pressures remained stable.     12/22/2020:  No acute events overnight.  Patient endorses improvement in abdominal pain.  Now off pressors.  Still having episodes of hypoglycemia.  Continue close ICU monitoring.       12/23/2020: No acute events overnight.  Patient is more anemic this morning and still having some bloody bowel movements.  Will provide 2 units packed red blood cells and continue inpatient monitoring.  Her hypoglycemia appears to be improving.    12/24 : seen and examined, tolerating po, cdiff positive, off of pressors, renal functions improving     Interval History: a few bloody stools overnight     Review of Systems   Constitutional: Positive for appetite change and fatigue.   Gastrointestinal: Positive for abdominal pain, diarrhea and nausea. Negative for blood in stool and vomiting.   Musculoskeletal: Positive for back pain.   Neurological: Positive for weakness and light-headedness.   Psychiatric/Behavioral: Positive for sleep disturbance. Negative for agitation.   All other systems reviewed and are negative.     Objective:      Vitals:    12/24/20 0745 12/24/20 0845 12/24/20 0930 12/24/20 1000   BP: 130/70 127/60 (!) 173/96 (!) 140/81   Pulse: 80 82 78 75   Resp: 20 19 (!) 22 18   Temp: 98.1 °F (36.7 °C)      TempSrc: Oral      SpO2: 100% 100% 100% 100%   Weight:       Height:           Weight: 88.5 kg (195 lb 1.7 oz)  Body mass index is 31.49 kg/m².     Intake/Output Summary (Last 24 hours) at 12/23/2020 0904  Last data filed at 12/23/2020 0600      Gross per 24 hour   Intake 3120 ml    Output --   Net 3120 ml      Physical Exam  Vitals signs and nursing note reviewed.   Constitutional:       General: She is in acute distress.      Appearance: She is obese. She is ill-appearing.   HENT:      Head: Normocephalic and atraumatic.      Mouth/Throat:      Mouth: Mucous membranes are dry.      Pharynx: Oropharynx is clear.   Eyes:      Extraocular Movements: Extraocular movements intact.      Pupils: Pupils are equal, round, and reactive to light.   Neck:      Musculoskeletal: Neck supple.   Cardiovascular:      Rate and Rhythm: Normal rate and regular rhythm.      Heart sounds: Normal heart sounds. No murmur. No gallop.    Pulmonary:      Effort: Pulmonary effort is normal. No respiratory distress.      Breath sounds: Normal breath sounds. No stridor. No wheezing or rales.   Abdominal:      General: Bowel sounds are normal. There is no distension.      Palpations: Abdomen is soft.      Tenderness: There is abdominal tenderness (suprapubic and LLQ).   Musculoskeletal: Normal range of motion.         General: No swelling.   Skin:     General: Skin is warm and dry.      Coloration: Skin is pale.   Neurological:      General: No focal deficit present.      Mental Status: She is alert. Mental status is at baseline. She is disoriented.      Motor: Weakness present.   Psychiatric:         Mood and Affect: Mood normal.            Significant Labs: All pertinent labs within the past 24 hours have been reviewed.     Lab Results   Component Value Date    WBC 9.35 12/24/2020    HGB 11.4 (L) 12/24/2020    HCT 33.7 (L) 12/24/2020    MCV 86 12/24/2020     12/24/2020       Recent Labs   Lab 12/24/20  0340      K 3.4*      CO2 31*   BUN 26*   CREATININE 2.4*   CALCIUM 7.9*       Significant Imaging: I have reviewed and interpreted all pertinent imaging results/findings within the past 24 hours.        Assessment/Plan:      * Septic shock  This is secondary to colitis.  Current treatment includes  metronidazole and ciprofloxacin.  Overall symptomatic improvement noted.     No off levophed.               Diabetes mellitus, type 2  Last A1c reviewed- No results found for: LABA1C, HGBA1C  Most recent fingerstick glucose reviewed-          Recent Labs   Lab 12/22/20  1219 12/22/20  1735 12/22/20  2336 12/23/20  0529   POCTGLUCOSE 109 65* 98 89      Current correctional scale  Low  Maintain anti-hyperglycemic dose as follows-              Antihyperglycemics (From admission, onward)     Start     Stop Route Frequency Ordered     12/20/20 0238   insulin aspart U-100 pen 0-5 Units      -- SubQ Every 6 hours PRN 12/20/20 0141          Hold Oral hypoglycemics while patient is in the hospital.           Hypoglycemia  Current treatment: D5W at 100 cc/h.  Will provide amp of D50 as needed.keep chronic discomfortkeep           POCT Glucose   Date Value Ref Range Status   12/23/2020 89 70 - 110 mg/dL Final   12/22/2020 98 70 - 110 mg/dL Final   12/22/2020 65 (L) 70 - 110 mg/dL Final   12/22/2020 109 70 - 110 mg/dL Final   12/22/2020 153 (H) 70 - 110 mg/dL Final   12/22/2020 <20 (L) 70 - 110 mg/dL Final   12/22/2020 <20 (L) 70 - 110 mg/dL Final   12/22/2020 27 (LL) 70 - 110 mg/dL Final   12/22/2020 88 70 - 110 mg/dL Final   12/21/2020 60 (L) 70 - 110 mg/dL Final   12/21/2020 83 70 - 110 mg/dL Final   12/21/2020 25 (LL) 70 - 110 mg/dL Final   12/21/2020 44 (LL) 70 - 110 mg/dL Final   12/21/2020 106 70 - 110 mg/dL Final   12/21/2020 59 (L) 70 - 110 mg/dL Final   12/21/2020 27 (LL) 70 - 110 mg/dL Final   12/21/2020 83 70 - 110 mg/dL Final   12/20/2020 118 (H) 70 - 110 mg/dL Final   12/20/2020 <20 (L) 70 - 110 mg/dL Final   12/20/2020 <20 (L) 70 - 110 mg/dL Final   12/20/2020 <20 (L) 70 - 110 mg/dL Final   12/20/2020 <20 (L) 70 - 110 mg/dL Final   12/20/2020 74 70 - 110 mg/dL Final               UTI (urinary tract infection)  Current treatment: Ciprofloxacin.       Patient has a leukocytosis.  Last trend as follows-         Lab  Results   Component Value Date     WBC 5.79 12/23/2020     WBC 7.66 12/22/2020     WBC 11.32 12/21/2020                  Blood Culture, Routine   Date Value Ref Range Status   12/19/2020 No Growth to date   Preliminary   12/19/2020 No Growth to date   Preliminary   12/19/2020 No Growth to date   Preliminary   12/19/2020 No Growth to date   Preliminary            Urine Culture, Routine   Date Value Ref Range Status   12/19/2020 KLEBSIELLA PNEUMONIAE  >100,000 cfu/ml   (A)   Final                  Acute renal failure        Lab Results   Component Value Date     CREATININE 2.7 (H) 12/23/2020     CREATININE 3.0 (H) 12/22/2020     CREATININE 3.2 (H) 12/21/2020      Will continue gentle IVF.       Will consult Nephrology.        Colitis  Current treatment: Ciprofloxacin and Flagyl.               VTE Risk Mitigation (From admission, onward)           Ordered        IP VTE HIGH RISK PATIENT  Once      12/19/20 0958        Place CAROLINE hose  Until discontinued      12/19/20 0958                     Discharge Planning   MARCELLE:      Code Status: Full Code   Is the patient medically ready for discharge?:     Reason for patient still in hospital (select all that apply): Treatment  Discharge Plan A: Home with family       cdiff colitis   ros    Plan :  Adv dieta s elsa  Change abx to po vanc  Transfer to [facility]

## 2020-12-24 NOTE — NURSING
Pt brought to Med/surg at this time via bed, all belongings with patient. Son, Leonardo, updated on patient moving.

## 2020-12-24 NOTE — NURSING
Pt had a 16 beat run of v-tach. Patient talking and conscious during this. Converted back to normal sinus rhythm, HR 80. Notified Dr. Sepulveda, verbal order of serum magnesium given. Will continue to monitor.

## 2020-12-24 NOTE — SUBJECTIVE & OBJECTIVE
Interval History: Feels better today.  Tolerating po intake.  Minimal abdominal pain.  Still with bloody BM    Medications:  Continuous Infusions:   sodium bicarbonate drip 100 mL/hr at 12/23/20 2005     Scheduled Meds:   ciprofloxacin (CIPRO)400mg/200ml D5W IVPB  400 mg Intravenous Q24H    gabapentin  100 mg Oral BID    metronidazole  500 mg Intravenous Q8H    montelukast  10 mg Oral QHS    mupirocin   Nasal BID    pantoprazole  40 mg Intravenous BID    sucralfate  1 g Oral QID     PRN Meds:sodium chloride, sodium chloride, dextrose 50%, dextrose 50%, glucagon (human recombinant), glucose, glucose, insulin aspart U-100, melatonin, ondansetron, oxyCODONE-acetaminophen, sodium chloride 0.9%     Review of patient's allergies indicates:   Allergen Reactions    Motrin [ibuprofen] Other (See Comments)     Feels like heart is swelling    Penicillins Hives and Itching    Sulfa (sulfonamide antibiotics) Nausea And Vomiting     Objective:     Vital Signs (Most Recent):  Temp: 98.5 °F (36.9 °C) (12/23/20 1929)  Pulse: 80 (12/23/20 2200)  Resp: (!) 28 (12/23/20 2200)  BP: (!) 188/88 (12/23/20 2200)  SpO2: 100 % (12/23/20 2200) Vital Signs (24h Range):  Temp:  [97.7 °F (36.5 °C)-98.7 °F (37.1 °C)] 98.5 °F (36.9 °C)  Pulse:  [74-90] 80  Resp:  [12-36] 28  SpO2:  [75 %-100 %] 100 %  BP: ()/() 188/88     Weight: 88.5 kg (195 lb 1.7 oz)  Body mass index is 31.49 kg/m².    Intake/Output - Last 3 Shifts       12/21 0700 - 12/22 0659 12/22 0700 - 12/23 0659 12/23 0700 - 12/24 0659    P.O. 300 687 600    I.V. (mL/kg) 2379 (26.9) 2133 (24.1) 1325 (15)    Blood   605    IV Piggyback 477 300 200    Total Intake(mL/kg) 3156 (35.7) 3120 (35.3) 2730 (30.8)    Net +3156 +3120 +2730           Urine Occurrence 2 x 6 x 3 x    Stool Occurrence 6 x 7 x           Physical Exam  Vitals signs and nursing note reviewed.   Constitutional:       General: She is not in acute distress.     Appearance: Normal appearance. She is not  ill-appearing.   HENT:      Head: Normocephalic and atraumatic.      Nose: Nose normal.      Mouth/Throat:      Mouth: Mucous membranes are moist.      Pharynx: Oropharynx is clear.   Eyes:      Conjunctiva/sclera: Conjunctivae normal.      Pupils: Pupils are equal, round, and reactive to light.   Neck:      Musculoskeletal: Neck supple.   Cardiovascular:      Rate and Rhythm: Normal rate and regular rhythm.      Pulses: Normal pulses.      Heart sounds: Normal heart sounds. No murmur. No gallop.    Pulmonary:      Effort: Pulmonary effort is normal. No respiratory distress.      Breath sounds: Normal breath sounds. No stridor. No wheezing, rhonchi or rales.   Abdominal:      General: Abdomen is flat. Bowel sounds are normal. There is no distension.      Palpations: Abdomen is soft.      Tenderness: There is abdominal tenderness (right lower quadrant and suprapubic). There is guarding (voluntary).   Musculoskeletal: Normal range of motion.         General: No swelling.   Skin:     General: Skin is warm and dry.      Coloration: Skin is not jaundiced or pale.   Neurological:      General: No focal deficit present.      Mental Status: She is alert and oriented to person, place, and time. Mental status is at baseline.      Motor: No weakness.   Psychiatric:         Mood and Affect: Mood normal.         Thought Content: Thought content normal.         Judgment: Judgment normal.         Significant Labs:  CBC:   Recent Labs   Lab 12/23/20 0409   WBC 5.79   RBC 2.53*   HGB 7.0*   HCT 21.9*      MCV 87   MCH 27.7   MCHC 32.0     BMP:   Recent Labs   Lab 12/23/20 0409 12/23/20 2005   GLU 93  --      --    K 3.5  --      --    CO2 26  --    BUN 33*  --    CREATININE 2.7*  --    CALCIUM 7.4*  --    MG  --  1.8     CMP:   Recent Labs   Lab 12/23/20 0409   GLU 93   CALCIUM 7.4*   ALBUMIN 2.4*   PROT 5.1*      K 3.5   CO2 26      BUN 33*   CREATININE 2.7*   ALKPHOS 102   ALT 10   AST 29    BILITOT 0.3       Significant Diagnostics:  no new

## 2020-12-24 NOTE — SUBJECTIVE & OBJECTIVE
Interval History:  Pt. Stable- in ICU    Review of patient's allergies indicates:   Allergen Reactions    Motrin [ibuprofen] Other (See Comments)     Feels like heart is swelling    Penicillins Hives and Itching    Sulfa (sulfonamide antibiotics) Nausea And Vomiting     Current Facility-Administered Medications   Medication Frequency    0.9%  NaCl infusion (for blood administration) Q24H PRN    0.9%  NaCl infusion (for blood administration) Q24H PRN    dextrose 50% injection 12.5 g PRN    dextrose 50% injection 25 g PRN    gabapentin capsule 100 mg BID    glucagon (human recombinant) injection 1 mg PRN    insulin aspart U-100 pen 0-5 Units Q6H PRN    melatonin tablet 6 mg Nightly PRN    metronidazole IVPB 500 mg Q8H    montelukast tablet 10 mg QHS    mupirocin 2 % ointment BID    ondansetron injection 4 mg Q8H PRN    oxyCODONE-acetaminophen  mg per tablet 1 tablet Q4H PRN    pantoprazole injection 40 mg BID    sodium bicarbonate 100 mEq in dextrose 5 % 1,000 mL infusion Continuous    sodium chloride 0.9% flush 10 mL PRN    sucralfate tablet 1 g QID    vancomycin 25 mg/mL oral solution 125 mg Q6H       Objective:     Vital Signs (Most Recent):  Temp: 97.8 °F (36.6 °C) (12/24/20 1326)  Pulse: 75 (12/24/20 1326)  Resp: 18 (12/24/20 1326)  BP: (!) 189/88 (12/24/20 1326)  SpO2: 98 % (12/24/20 1326)  O2 Device (Oxygen Therapy): room air (12/24/20 1326) Vital Signs (24h Range):  Temp:  [97.8 °F (36.6 °C)-98.7 °F (37.1 °C)] 97.8 °F (36.6 °C)  Pulse:  [73-90] 75  Resp:  [13-28] 18  SpO2:  [83 %-100 %] 98 %  BP: (122-189)/() 189/88     Weight: 88.5 kg (195 lb 1.7 oz) (12/21/20 0600)  Body mass index is 31.49 kg/m².  Body surface area is 2.03 meters squared.    I/O last 3 completed shifts:  In: 5658 [P.O.:960; I.V.:3593; Blood:605; IV Piggyback:500]  Out: -     Physical Exam  Vitals signs reviewed.   Constitutional:       Appearance: Normal appearance.   HENT:      Head: Normocephalic and  atraumatic.   Cardiovascular:      Rate and Rhythm: Normal rate and regular rhythm.      Pulses: Normal pulses.      Heart sounds: Normal heart sounds.   Pulmonary:      Effort: Pulmonary effort is normal.      Breath sounds: Normal breath sounds.   Abdominal:      General: Bowel sounds are normal.      Palpations: Abdomen is soft.   Musculoskeletal:         General: No swelling.   Skin:     General: Skin is warm and dry.   Neurological:      General: No focal deficit present.      Mental Status: She is alert and oriented to person, place, and time. Mental status is at baseline.   Psychiatric:         Mood and Affect: Mood normal.         Behavior: Behavior normal.         Thought Content: Thought content normal.         Significant Labs:  Cardiac Markers: No results for input(s): CKMB, TROPONINT, MYOGLOBIN in the last 168 hours.  CBC:   Recent Labs   Lab 12/24/20  0340   WBC 9.35   RBC 3.91*   HGB 11.4*   HCT 33.7*      MCV 86   MCH 29.2   MCHC 33.8     CMP:   Recent Labs   Lab 12/24/20  0340   *   CALCIUM 7.9*   ALBUMIN 2.7*   PROT 5.8*      K 3.4*   CO2 31*      BUN 26*   CREATININE 2.4*   ALKPHOS 108   ALT 12   AST 46*   BILITOT 0.3     Recent Labs   Lab 12/19/20  0748   COLORU Yellow   SPECGRAV 1.010   PHUR 6.0   PROTEINUA 1+*   BACTERIA Many*   NITRITE Negative   LEUKOCYTESUR 3+*   UROBILINOGEN Negative   HYALINECASTS 0     All labs within the past 24 hours have been reviewed.     Significant Imaging:  Labs: Reviewed  X-Ray: Reviewed  CT: Reviewed

## 2020-12-24 NOTE — PLAN OF CARE
12/24/20 0810   Discharge Reassessment   Anticipated Discharge Disposition Home   Discharge Plan A Home with family   Discharge Plan B Home Health;Skilled Nursing Facility   Post-Acute Status   Discharge Delays None known at this time     Reevaluate plan w/patient's son once patient is medically stable.  Patient was living w/son prior to admission.  Son does not know if he can care for patient at home until patient is medically stable.  Will re-evaluate for possible SNF placement.

## 2020-12-24 NOTE — PT/OT/SLP PROGRESS
"Physical Therapy Treatment    Patient Name:  Smiley Harmon   MRN:  [id number]    Recommendations:     Discharge Recommendations:  (to be determined)   Discharge Equipment Recommendations: (to be determined)   Barriers to discharge: current functional and medical status    Assessment:     Smiley Harmon is a 80 y.o. female admitted with a medical diagnosis of Septic shock.  She presents with the following impairments/functional limitations:    decreased functional mobility and strength impacting bed mobility, gait, balance, transfers.    Rehab Prognosis: Fair; patient would benefit from acute skilled PT services to address these deficits and reach maximum level of function.    Recent Surgery: * No surgery found *      Plan:     During this hospitalization, patient to be seen (up to 12 times per week) to address the identified rehab impairments via gait training, therapeutic activities, therapeutic exercises and progress toward the following goals:    · Plan of Care Expires:  01/07/21    Subjective     Chief Complaint: Patient reports "I'm feeling better today." Patient agreeable to therapy treatment with supine exercises in bed.   Patient/Family Comments/goals: "I'm still so weak."  Pain/Comfort:  · Pain Rating 1: 0/10      Objective:     Communicated with nurse prior to session.Patient blood pressure elevated but nurse approves PT therex.  Patient found supine with blood pressure cuff, peripheral IV, SCD, telemetry upon PT entry to room.     General Precautions: Standard, contact, fall   Orthopedic Precautions:N/A   Braces:   N/A    Therapeutic Activities and Exercises:   (B) LE AAROM exercises including DF/PF, heel slides, SLR, hip abduction all x20 reps.     Patient left supine with all lines intact.. Nurse notified.     GOALS:   Multidisciplinary Problems     Physical Therapy Goals        Problem: Physical Therapy Goal    Goal Priority Disciplines Outcome Goal Variances Interventions   Physical Therapy Goal "     PT, PT/OT      Description: Goals to be met by: 1/7/2021     Patient will increase functional independence with mobility by performing:     Supine to sit with Contact Guard Assistance   Sit to supine with Contact Guard Assistance   Rolling to Left and Right with Contact Guard Assistance.   Sit to stand transfer with Minimal Assistance   Gait  x 25 feet with Minimal Assistance using Rolling Walker.                    Time Tracking:     PT Received On: 12/24/20  PT Start Time: 0945     PT Stop Time: 0958  PT Total Time (min): 13 min     Billable Minutes: Therapeutic Exercise 13    Treatment Type: Treatment  PT/PTA: PT     PTA Visit Number: 0     Laura Andrews, PT  12/24/2020

## 2020-12-25 LAB
ALBUMIN SERPL BCP-MCNC: 3.2 G/DL (ref 3.5–5.2)
ALP SERPL-CCNC: 118 U/L (ref 55–135)
ALT SERPL W/O P-5'-P-CCNC: 18 U/L (ref 10–44)
ANION GAP SERPL CALC-SCNC: 5 MMOL/L (ref 8–16)
AST SERPL-CCNC: 55 U/L (ref 10–40)
BASOPHILS # BLD AUTO: 0.05 K/UL (ref 0–0.2)
BASOPHILS NFR BLD: 0.8 % (ref 0–1.9)
BILIRUB SERPL-MCNC: 0.5 MG/DL (ref 0.1–1)
BUN SERPL-MCNC: 18 MG/DL (ref 8–23)
CALCIUM SERPL-MCNC: 8.6 MG/DL (ref 8.7–10.5)
CHLORIDE SERPL-SCNC: 98 MMOL/L (ref 95–110)
CO2 SERPL-SCNC: 33 MMOL/L (ref 23–29)
CREAT SERPL-MCNC: 2 MG/DL (ref 0.5–1.4)
DIFFERENTIAL METHOD: ABNORMAL
EOSINOPHIL # BLD AUTO: 0.3 K/UL (ref 0–0.5)
EOSINOPHIL NFR BLD: 5.4 % (ref 0–8)
ERYTHROCYTE [DISTWIDTH] IN BLOOD BY AUTOMATED COUNT: 17.2 % (ref 11.5–14.5)
EST. GFR  (AFRICAN AMERICAN): 27 ML/MIN/1.73 M^2
EST. GFR  (NON AFRICAN AMERICAN): 23.4 ML/MIN/1.73 M^2
GLUCOSE SERPL-MCNC: 111 MG/DL (ref 70–110)
HCT VFR BLD AUTO: 38.9 % (ref 37–48.5)
HGB BLD-MCNC: 12.5 G/DL (ref 12–16)
IMM GRANULOCYTES # BLD AUTO: 0.03 K/UL (ref 0–0.04)
IMM GRANULOCYTES NFR BLD AUTO: 0.5 % (ref 0–0.5)
LYMPHOCYTES # BLD AUTO: 1 K/UL (ref 1–4.8)
LYMPHOCYTES NFR BLD: 15.5 % (ref 18–48)
MAGNESIUM SERPL-MCNC: 1.8 MG/DL (ref 1.6–2.6)
MCH RBC QN AUTO: 28.1 PG (ref 27–31)
MCHC RBC AUTO-ENTMCNC: 32.1 G/DL (ref 32–36)
MCV RBC AUTO: 87 FL (ref 82–98)
MONOCYTES # BLD AUTO: 0.7 K/UL (ref 0.3–1)
MONOCYTES NFR BLD: 10.4 % (ref 4–15)
NEUTROPHILS # BLD AUTO: 4.2 K/UL (ref 1.8–7.7)
NEUTROPHILS NFR BLD: 67.4 % (ref 38–73)
NRBC BLD-RTO: 0 /100 WBC
PLATELET # BLD AUTO: 189 K/UL (ref 150–350)
PMV BLD AUTO: 10.9 FL (ref 9.2–12.9)
POCT GLUCOSE: 112 MG/DL (ref 70–110)
POTASSIUM SERPL-SCNC: 3.5 MMOL/L (ref 3.5–5.1)
PROT SERPL-MCNC: 6.9 G/DL (ref 6–8.4)
RBC # BLD AUTO: 4.45 M/UL (ref 4–5.4)
SODIUM SERPL-SCNC: 136 MMOL/L (ref 136–145)
WBC # BLD AUTO: 6.25 K/UL (ref 3.9–12.7)

## 2020-12-25 PROCEDURE — 80053 COMPREHEN METABOLIC PANEL: CPT

## 2020-12-25 PROCEDURE — 99900035 HC TECH TIME PER 15 MIN (STAT)

## 2020-12-25 PROCEDURE — 36415 COLL VENOUS BLD VENIPUNCTURE: CPT

## 2020-12-25 PROCEDURE — 25000003 PHARM REV CODE 250: Performed by: INTERNAL MEDICINE

## 2020-12-25 PROCEDURE — S0030 INJECTION, METRONIDAZOLE: HCPCS | Performed by: EMERGENCY MEDICINE

## 2020-12-25 PROCEDURE — 63600175 PHARM REV CODE 636 W HCPCS: Performed by: INTERNAL MEDICINE

## 2020-12-25 PROCEDURE — 94761 N-INVAS EAR/PLS OXIMETRY MLT: CPT

## 2020-12-25 PROCEDURE — 83735 ASSAY OF MAGNESIUM: CPT

## 2020-12-25 PROCEDURE — 11000001 HC ACUTE MED/SURG PRIVATE ROOM

## 2020-12-25 PROCEDURE — 63600175 PHARM REV CODE 636 W HCPCS: Performed by: EMERGENCY MEDICINE

## 2020-12-25 PROCEDURE — 85025 COMPLETE CBC W/AUTO DIFF WBC: CPT

## 2020-12-25 PROCEDURE — C9113 INJ PANTOPRAZOLE SODIUM, VIA: HCPCS | Performed by: INTERNAL MEDICINE

## 2020-12-25 PROCEDURE — 25000003 PHARM REV CODE 250: Performed by: EMERGENCY MEDICINE

## 2020-12-25 PROCEDURE — 99900031 HC PATIENT EDUCATION (STAT)

## 2020-12-25 RX ADMIN — METRONIDAZOLE 500 MG: 500 INJECTION, SOLUTION INTRAVENOUS at 08:12

## 2020-12-25 RX ADMIN — VANCOMYCIN HYDROCHLORIDE 125 MG: KIT at 12:12

## 2020-12-25 RX ADMIN — OXYCODONE AND ACETAMINOPHEN 1 TABLET: 325; 10 TABLET ORAL at 08:12

## 2020-12-25 RX ADMIN — PANTOPRAZOLE SODIUM 40 MG: 40 INJECTION, POWDER, FOR SOLUTION INTRAVENOUS at 09:12

## 2020-12-25 RX ADMIN — VANCOMYCIN HYDROCHLORIDE 125 MG: KIT at 05:12

## 2020-12-25 RX ADMIN — GABAPENTIN 100 MG: 100 CAPSULE ORAL at 08:12

## 2020-12-25 RX ADMIN — OXYCODONE AND ACETAMINOPHEN 1 TABLET: 325; 10 TABLET ORAL at 01:12

## 2020-12-25 RX ADMIN — OXYCODONE AND ACETAMINOPHEN 1 TABLET: 325; 10 TABLET ORAL at 02:12

## 2020-12-25 RX ADMIN — VANCOMYCIN HYDROCHLORIDE 125 MG: KIT at 11:12

## 2020-12-25 RX ADMIN — SUCRALFATE 1 G: 1 TABLET ORAL at 08:12

## 2020-12-25 RX ADMIN — SUCRALFATE 1 G: 1 TABLET ORAL at 01:12

## 2020-12-25 RX ADMIN — ONDANSETRON 4 MG: 2 INJECTION INTRAMUSCULAR; INTRAVENOUS at 03:12

## 2020-12-25 RX ADMIN — PANTOPRAZOLE SODIUM 40 MG: 40 INJECTION, POWDER, FOR SOLUTION INTRAVENOUS at 08:12

## 2020-12-25 RX ADMIN — VANCOMYCIN HYDROCHLORIDE 125 MG: KIT at 06:12

## 2020-12-25 RX ADMIN — SUCRALFATE 1 G: 1 TABLET ORAL at 04:12

## 2020-12-25 RX ADMIN — METRONIDAZOLE 500 MG: 500 INJECTION, SOLUTION INTRAVENOUS at 03:12

## 2020-12-25 RX ADMIN — POTASSIUM CHLORIDE AND SODIUM CHLORIDE 75 ML/HR: 900; 150 INJECTION, SOLUTION INTRAVENOUS at 09:12

## 2020-12-25 RX ADMIN — MONTELUKAST 10 MG: 10 TABLET, FILM COATED ORAL at 08:12

## 2020-12-25 NOTE — PLAN OF CARE
Instucted patient to call for assistance for assitance. Patient blood sugar was tested before dinner time. She is awake and alert at this time, but gets confused at time. Frequent check were made on the patient, and she was turned every 2 hours

## 2020-12-25 NOTE — PROGRESS NOTES
Principal Problem:Septic shock           HPI:  No notes on file     Overview/Hospital Course:  12/21/2020:  Patient states that overall she feels slightly better.  She has experienced an episode of hypoglycemia.  I plan to transition the patient's fluid to D5W.  She has been given an amp of bicarb.  We will continue close ICU monitoring.  She is requiring very minimal levo fed.  Will discontinue once mean arterial pressures remained stable.     12/22/2020:  No acute events overnight.  Patient endorses improvement in abdominal pain.  Now off pressors.  Still having episodes of hypoglycemia.  Continue close ICU monitoring.       12/23/2020: No acute events overnight.  Patient is more anemic this morning and still having some bloody bowel movements.  Will provide 2 units packed red blood cells and continue inpatient monitoring.  Her hypoglycemia appears to be improving.    12/24 : seen and examined, tolerating po, cdiff positive, off of pressors, renal functions improving    12/25 : madonna nd examined, kidney functions continues to improve     Interval History: a few bloody stools overnight     Review of Systems   Constitutional: Positive for appetite change and fatigue.   Gastrointestinal: Positive for abdominal pain, diarrhea and nausea. Negative for blood in stool and vomiting.   Musculoskeletal: Positive for back pain.   Neurological: Positive for weakness and light-headedness.   Psychiatric/Behavioral: Positive for sleep disturbance. Negative for agitation.   All other systems reviewed and are negative.     Objective:      Vitals:    12/25/20 0704 12/25/20 0741 12/25/20 0742 12/25/20 0815   BP: (!) 169/111 (!) 190/110     BP Location: Left arm      Patient Position: Lying      Pulse: 77 76 77    Resp: 20 18 18 20   Temp: 97.7 °F (36.5 °C) 98.6 °F (37 °C)     TempSrc: Oral      SpO2: 99% 99% 99%    Weight:       Height:           Weight: 88.5 kg (195 lb 1.7 oz)  Body mass index is 31.49 kg/m².     Intake/Output  Summary (Last 24 hours) at 12/23/2020 0904  Last data filed at 12/23/2020 0600      Gross per 24 hour   Intake 3120 ml   Output --   Net 3120 ml      Physical Exam  Vitals signs and nursing note reviewed.   Constitutional:       General: She is in acute distress.      Appearance: She is obese. She is ill-appearing.   HENT:      Head: Normocephalic and atraumatic.      Mouth/Throat:      Mouth: Mucous membranes are dry.      Pharynx: Oropharynx is clear.   Eyes:      Extraocular Movements: Extraocular movements intact.      Pupils: Pupils are equal, round, and reactive to light.   Neck:      Musculoskeletal: Neck supple.   Cardiovascular:      Rate and Rhythm: Normal rate and regular rhythm.      Heart sounds: Normal heart sounds. No murmur. No gallop.    Pulmonary:      Effort: Pulmonary effort is normal. No respiratory distress.      Breath sounds: Normal breath sounds. No stridor. No wheezing or rales.   Abdominal:      General: Bowel sounds are normal. There is no distension.      Palpations: Abdomen is soft.      Tenderness: There is abdominal tenderness (suprapubic and LLQ).   Musculoskeletal: Normal range of motion.         General: No swelling.   Skin:     General: Skin is warm and dry.      Coloration: Skin is pale.   Neurological:      General: No focal deficit present.      Mental Status: She is alert. Mental status is at baseline. She is disoriented.      Motor: Weakness present.   Psychiatric:         Mood and Affect: Mood normal.            Significant Labs: All pertinent labs within the past 24 hours have been reviewed.     Lab Results   Component Value Date    WBC 6.25 12/25/2020    HGB 12.5 12/25/2020    HCT 38.9 12/25/2020    MCV 87 12/25/2020     12/25/2020       Recent Labs   Lab 12/25/20  0617      K 3.5   CL 98   CO2 33*   BUN 18   CREATININE 2.0*   CALCIUM 8.6*       Significant Imaging: I have reviewed and interpreted all pertinent imaging results/findings within the past 24  hours.        Assessment/Plan:      * Septic shock  This is secondary to colitis.  Current treatment includes metronidazole and ciprofloxacin.  Overall symptomatic improvement noted.     No off levophed.               Diabetes mellitus, type 2  Last A1c reviewed- No results found for: LABA1C, HGBA1C  Most recent fingerstick glucose reviewed-          Recent Labs   Lab 12/22/20  1219 12/22/20  1735 12/22/20  2336 12/23/20  0529   POCTGLUCOSE 109 65* 98 89      Current correctional scale  Low  Maintain anti-hyperglycemic dose as follows-              Antihyperglycemics (From admission, onward)     Start     Stop Route Frequency Ordered     12/20/20 0238   insulin aspart U-100 pen 0-5 Units      -- SubQ Every 6 hours PRN 12/20/20 0141          Hold Oral hypoglycemics while patient is in the hospital.           Hypoglycemia  Current treatment: D5W at 100 cc/h.  Will provide amp of D50 as needed.keep chronic discomfortkeep           POCT Glucose   Date Value Ref Range Status   12/23/2020 89 70 - 110 mg/dL Final   12/22/2020 98 70 - 110 mg/dL Final   12/22/2020 65 (L) 70 - 110 mg/dL Final   12/22/2020 109 70 - 110 mg/dL Final   12/22/2020 153 (H) 70 - 110 mg/dL Final   12/22/2020 <20 (L) 70 - 110 mg/dL Final   12/22/2020 <20 (L) 70 - 110 mg/dL Final   12/22/2020 27 (LL) 70 - 110 mg/dL Final   12/22/2020 88 70 - 110 mg/dL Final   12/21/2020 60 (L) 70 - 110 mg/dL Final   12/21/2020 83 70 - 110 mg/dL Final   12/21/2020 25 (LL) 70 - 110 mg/dL Final   12/21/2020 44 (LL) 70 - 110 mg/dL Final   12/21/2020 106 70 - 110 mg/dL Final   12/21/2020 59 (L) 70 - 110 mg/dL Final   12/21/2020 27 (LL) 70 - 110 mg/dL Final   12/21/2020 83 70 - 110 mg/dL Final   12/20/2020 118 (H) 70 - 110 mg/dL Final   12/20/2020 <20 (L) 70 - 110 mg/dL Final   12/20/2020 <20 (L) 70 - 110 mg/dL Final   12/20/2020 <20 (L) 70 - 110 mg/dL Final   12/20/2020 <20 (L) 70 - 110 mg/dL Final   12/20/2020 74 70 - 110 mg/dL Final               UTI (urinary tract  infection)  Current treatment: Ciprofloxacin.       Patient has a leukocytosis.  Last trend as follows-         Lab Results   Component Value Date     WBC 5.79 12/23/2020     WBC 7.66 12/22/2020     WBC 11.32 12/21/2020                  Blood Culture, Routine   Date Value Ref Range Status   12/19/2020 No Growth to date   Preliminary   12/19/2020 No Growth to date   Preliminary   12/19/2020 No Growth to date   Preliminary   12/19/2020 No Growth to date   Preliminary            Urine Culture, Routine   Date Value Ref Range Status   12/19/2020 KLEBSIELLA PNEUMONIAE  >100,000 cfu/ml   (A)   Final                  Acute renal failure        Lab Results   Component Value Date     CREATININE 2.7 (H) 12/23/2020     CREATININE 3.0 (H) 12/22/2020     CREATININE 3.2 (H) 12/21/2020      Will continue gentle IVF.       Will consult Nephrology.        Colitis  Current treatment: Ciprofloxacin and Flagyl.               VTE Risk Mitigation (From admission, onward)           Ordered        IP VTE HIGH RISK PATIENT  Once      12/19/20 0958        Place CAROLINE hose  Until discontinued      12/19/20 0958                     Discharge Planning   MARCELLE:      Code Status: Full Code   Is the patient medically ready for discharge?:     Reason for patient still in hospital (select all that apply): Treatment  Discharge Plan A: Home with family       cdiff colitis   ros    Plan :  Adv dieta s elsa  Continue with po vanc  Pt  Home soon

## 2020-12-26 LAB
ALBUMIN SERPL BCP-MCNC: 3.2 G/DL (ref 3.5–5.2)
ALP SERPL-CCNC: 105 U/L (ref 55–135)
ALT SERPL W/O P-5'-P-CCNC: 18 U/L (ref 10–44)
ANION GAP SERPL CALC-SCNC: 5 MMOL/L (ref 8–16)
AST SERPL-CCNC: 51 U/L (ref 10–40)
BASOPHILS # BLD AUTO: 0.07 K/UL (ref 0–0.2)
BASOPHILS NFR BLD: 0.9 % (ref 0–1.9)
BILIRUB SERPL-MCNC: 0.3 MG/DL (ref 0.1–1)
BUN SERPL-MCNC: 14 MG/DL (ref 8–23)
CALCIUM SERPL-MCNC: 8.5 MG/DL (ref 8.7–10.5)
CHLORIDE SERPL-SCNC: 100 MMOL/L (ref 95–110)
CO2 SERPL-SCNC: 32 MMOL/L (ref 23–29)
CREAT SERPL-MCNC: 1.9 MG/DL (ref 0.5–1.4)
DIFFERENTIAL METHOD: ABNORMAL
EOSINOPHIL # BLD AUTO: 0.5 K/UL (ref 0–0.5)
EOSINOPHIL NFR BLD: 5.6 % (ref 0–8)
ERYTHROCYTE [DISTWIDTH] IN BLOOD BY AUTOMATED COUNT: 17 % (ref 11.5–14.5)
EST. GFR  (AFRICAN AMERICAN): 28.7 ML/MIN/1.73 M^2
EST. GFR  (NON AFRICAN AMERICAN): 24.9 ML/MIN/1.73 M^2
GLUCOSE SERPL-MCNC: 109 MG/DL (ref 70–110)
HCT VFR BLD AUTO: 36.9 % (ref 37–48.5)
HGB BLD-MCNC: 11.7 G/DL (ref 12–16)
IMM GRANULOCYTES # BLD AUTO: 0.06 K/UL (ref 0–0.04)
IMM GRANULOCYTES NFR BLD AUTO: 0.7 % (ref 0–0.5)
LYMPHOCYTES # BLD AUTO: 1.3 K/UL (ref 1–4.8)
LYMPHOCYTES NFR BLD: 15.3 % (ref 18–48)
MCH RBC QN AUTO: 28.3 PG (ref 27–31)
MCHC RBC AUTO-ENTMCNC: 31.7 G/DL (ref 32–36)
MCV RBC AUTO: 89 FL (ref 82–98)
MONOCYTES # BLD AUTO: 1 K/UL (ref 0.3–1)
MONOCYTES NFR BLD: 11.8 % (ref 4–15)
NEUTROPHILS # BLD AUTO: 5.4 K/UL (ref 1.8–7.7)
NEUTROPHILS NFR BLD: 65.7 % (ref 38–73)
NRBC BLD-RTO: 0 /100 WBC
PLATELET # BLD AUTO: 226 K/UL (ref 150–350)
PMV BLD AUTO: 10.9 FL (ref 9.2–12.9)
POCT GLUCOSE: 340 MG/DL (ref 70–110)
POTASSIUM SERPL-SCNC: 4.2 MMOL/L (ref 3.5–5.1)
PROT SERPL-MCNC: 6.5 G/DL (ref 6–8.4)
RBC # BLD AUTO: 4.13 M/UL (ref 4–5.4)
SODIUM SERPL-SCNC: 137 MMOL/L (ref 136–145)
WBC # BLD AUTO: 8.17 K/UL (ref 3.9–12.7)

## 2020-12-26 PROCEDURE — 63600175 PHARM REV CODE 636 W HCPCS: Performed by: EMERGENCY MEDICINE

## 2020-12-26 PROCEDURE — C9113 INJ PANTOPRAZOLE SODIUM, VIA: HCPCS | Performed by: INTERNAL MEDICINE

## 2020-12-26 PROCEDURE — 25000003 PHARM REV CODE 250: Performed by: INTERNAL MEDICINE

## 2020-12-26 PROCEDURE — 63600175 PHARM REV CODE 636 W HCPCS: Performed by: INTERNAL MEDICINE

## 2020-12-26 PROCEDURE — 85025 COMPLETE CBC W/AUTO DIFF WBC: CPT

## 2020-12-26 PROCEDURE — 94761 N-INVAS EAR/PLS OXIMETRY MLT: CPT

## 2020-12-26 PROCEDURE — 80053 COMPREHEN METABOLIC PANEL: CPT

## 2020-12-26 PROCEDURE — 25000003 PHARM REV CODE 250: Performed by: EMERGENCY MEDICINE

## 2020-12-26 PROCEDURE — 99900035 HC TECH TIME PER 15 MIN (STAT)

## 2020-12-26 PROCEDURE — S0030 INJECTION, METRONIDAZOLE: HCPCS | Performed by: EMERGENCY MEDICINE

## 2020-12-26 PROCEDURE — 99900031 HC PATIENT EDUCATION (STAT)

## 2020-12-26 PROCEDURE — 11000001 HC ACUTE MED/SURG PRIVATE ROOM

## 2020-12-26 PROCEDURE — 36415 COLL VENOUS BLD VENIPUNCTURE: CPT

## 2020-12-26 RX ADMIN — SUCRALFATE 1 G: 1 TABLET ORAL at 04:12

## 2020-12-26 RX ADMIN — POTASSIUM CHLORIDE AND SODIUM CHLORIDE: 900; 150 INJECTION, SOLUTION INTRAVENOUS at 09:12

## 2020-12-26 RX ADMIN — OXYCODONE AND ACETAMINOPHEN 1 TABLET: 325; 10 TABLET ORAL at 04:12

## 2020-12-26 RX ADMIN — SUCRALFATE 1 G: 1 TABLET ORAL at 09:12

## 2020-12-26 RX ADMIN — PANTOPRAZOLE SODIUM 40 MG: 40 INJECTION, POWDER, FOR SOLUTION INTRAVENOUS at 09:12

## 2020-12-26 RX ADMIN — OXYCODONE AND ACETAMINOPHEN 1 TABLET: 325; 10 TABLET ORAL at 09:12

## 2020-12-26 RX ADMIN — MONTELUKAST 10 MG: 10 TABLET, FILM COATED ORAL at 09:12

## 2020-12-26 RX ADMIN — VANCOMYCIN HYDROCHLORIDE 125 MG: KIT at 06:12

## 2020-12-26 RX ADMIN — VANCOMYCIN HYDROCHLORIDE 125 MG: KIT at 05:12

## 2020-12-26 RX ADMIN — POTASSIUM CHLORIDE AND SODIUM CHLORIDE 75 ML/HR: 900; 150 INJECTION, SOLUTION INTRAVENOUS at 12:12

## 2020-12-26 RX ADMIN — METRONIDAZOLE 500 MG: 500 INJECTION, SOLUTION INTRAVENOUS at 12:12

## 2020-12-26 RX ADMIN — GABAPENTIN 100 MG: 100 CAPSULE ORAL at 08:12

## 2020-12-26 RX ADMIN — ONDANSETRON 4 MG: 2 INJECTION INTRAMUSCULAR; INTRAVENOUS at 03:12

## 2020-12-26 RX ADMIN — SUCRALFATE 1 G: 1 TABLET ORAL at 12:12

## 2020-12-26 RX ADMIN — GABAPENTIN 100 MG: 100 CAPSULE ORAL at 09:12

## 2020-12-26 RX ADMIN — METRONIDAZOLE 500 MG: 500 INJECTION, SOLUTION INTRAVENOUS at 03:12

## 2020-12-26 RX ADMIN — VANCOMYCIN HYDROCHLORIDE 125 MG: KIT at 12:12

## 2020-12-26 RX ADMIN — SUCRALFATE 1 G: 1 TABLET ORAL at 08:12

## 2020-12-26 RX ADMIN — METRONIDAZOLE 500 MG: 500 INJECTION, SOLUTION INTRAVENOUS at 08:12

## 2020-12-26 RX ADMIN — OXYCODONE AND ACETAMINOPHEN 1 TABLET: 325; 10 TABLET ORAL at 12:12

## 2020-12-26 RX ADMIN — VANCOMYCIN HYDROCHLORIDE 125 MG: KIT at 11:12

## 2020-12-26 NOTE — PROGRESS NOTES
Principal Problem:Septic shock           HPI:  No notes on file     Overview/Hospital Course:  12/21/2020:  Patient states that overall she feels slightly better.  She has experienced an episode of hypoglycemia.  I plan to transition the patient's fluid to D5W.  She has been given an amp of bicarb.  We will continue close ICU monitoring.  She is requiring very minimal levo fed.  Will discontinue once mean arterial pressures remained stable.     12/22/2020:  No acute events overnight.  Patient endorses improvement in abdominal pain.  Now off pressors.  Still having episodes of hypoglycemia.  Continue close ICU monitoring.       12/23/2020: No acute events overnight.  Patient is more anemic this morning and still having some bloody bowel movements.  Will provide 2 units packed red blood cells and continue inpatient monitoring.  Her hypoglycemia appears to be improving.    12/24 : seen and examined, tolerating po, cdiff positive, off of pressors, renal functions improving    12/26 : madonna nd examined, kidney functions continues to improve     Interval History: a few bloody stools overnight     Review of Systems   Constitutional: Positive for appetite change and fatigue.   Gastrointestinal: Positive for abdominal pain, diarrhea and nausea. Negative for blood in stool and vomiting.   Musculoskeletal: Positive for back pain.   Neurological: Positive for weakness and light-headedness.   Psychiatric/Behavioral: Positive for sleep disturbance. Negative for agitation.   All other systems reviewed and are negative.     Objective:      Vitals:    12/26/20 0409 12/26/20 0423 12/26/20 0740 12/26/20 0926   BP:  (!) 173/102 (!) 159/88 (!) 159/88   BP Location:  Left arm     Patient Position:  Lying     Pulse:  74 77 85   Resp: 18 12 18    Temp:  97.9 °F (36.6 °C) 97.8 °F (36.6 °C)    TempSrc:  Oral Oral    SpO2:  96% 95%    Weight:       Height:           Weight: 88.5 kg (195 lb 1.7 oz)  Body mass index is 31.49  kg/m².     Intake/Output Summary (Last 24 hours) at 12/23/2020 0904  Last data filed at 12/23/2020 0600      Gross per 24 hour   Intake 3120 ml   Output --   Net 3120 ml      Physical Exam  Vitals signs and nursing note reviewed.   Constitutional:       General: She is in acute distress.      Appearance: She is obese. She is ill-appearing.   HENT:      Head: Normocephalic and atraumatic.      Mouth/Throat:      Mouth: Mucous membranes are dry.      Pharynx: Oropharynx is clear.   Eyes:      Extraocular Movements: Extraocular movements intact.      Pupils: Pupils are equal, round, and reactive to light.   Neck:      Musculoskeletal: Neck supple.   Cardiovascular:      Rate and Rhythm: Normal rate and regular rhythm.      Heart sounds: Normal heart sounds. No murmur. No gallop.    Pulmonary:      Effort: Pulmonary effort is normal. No respiratory distress.      Breath sounds: Normal breath sounds. No stridor. No wheezing or rales.   Abdominal:      General: Bowel sounds are normal. There is no distension.      Palpations: Abdomen is soft.      Tenderness: There is abdominal tenderness (suprapubic and LLQ).   Musculoskeletal: Normal range of motion.         General: No swelling.   Skin:     General: Skin is warm and dry.      Coloration: Skin is pale.   Neurological:      General: No focal deficit present.      Mental Status: She is alert. Mental status is at baseline. She is disoriented.      Motor: Weakness present.   Psychiatric:         Mood and Affect: Mood normal.            Significant Labs: All pertinent labs within the past 24 hours have been reviewed.     Lab Results   Component Value Date    WBC 8.17 12/26/2020    HGB 11.7 (L) 12/26/2020    HCT 36.9 (L) 12/26/2020    MCV 89 12/26/2020     12/26/2020       Recent Labs   Lab 12/26/20  0532      K 4.2      CO2 32*   BUN 14   CREATININE 1.9*   CALCIUM 8.5*       Significant Imaging: I have reviewed and interpreted all pertinent imaging  results/findings within the past 24 hours.        Assessment/Plan:      * Septic shock  This is secondary to colitis.  Current treatment includes metronidazole and ciprofloxacin.  Overall symptomatic improvement noted.     No off levophed.               Diabetes mellitus, type 2  Last A1c reviewed- No results found for: LABA1C, HGBA1C  Most recent fingerstick glucose reviewed-          Recent Labs   Lab 12/22/20  1219 12/22/20  1735 12/22/20  2336 12/23/20  0529   POCTGLUCOSE 109 65* 98 89      Current correctional scale  Low  Maintain anti-hyperglycemic dose as follows-              Antihyperglycemics (From admission, onward)     Start     Stop Route Frequency Ordered     12/20/20 0238   insulin aspart U-100 pen 0-5 Units      -- SubQ Every 6 hours PRN 12/20/20 0141          Hold Oral hypoglycemics while patient is in the hospital.           Hypoglycemia  Current treatment: D5W at 100 cc/h.  Will provide amp of D50 as needed.keep chronic discomfortkeep           POCT Glucose   Date Value Ref Range Status   12/23/2020 89 70 - 110 mg/dL Final   12/22/2020 98 70 - 110 mg/dL Final   12/22/2020 65 (L) 70 - 110 mg/dL Final   12/22/2020 109 70 - 110 mg/dL Final   12/22/2020 153 (H) 70 - 110 mg/dL Final   12/22/2020 <20 (L) 70 - 110 mg/dL Final   12/22/2020 <20 (L) 70 - 110 mg/dL Final   12/22/2020 27 (LL) 70 - 110 mg/dL Final   12/22/2020 88 70 - 110 mg/dL Final   12/21/2020 60 (L) 70 - 110 mg/dL Final   12/21/2020 83 70 - 110 mg/dL Final   12/21/2020 25 (LL) 70 - 110 mg/dL Final   12/21/2020 44 (LL) 70 - 110 mg/dL Final   12/21/2020 106 70 - 110 mg/dL Final   12/21/2020 59 (L) 70 - 110 mg/dL Final   12/21/2020 27 (LL) 70 - 110 mg/dL Final   12/21/2020 83 70 - 110 mg/dL Final   12/20/2020 118 (H) 70 - 110 mg/dL Final   12/20/2020 <20 (L) 70 - 110 mg/dL Final   12/20/2020 <20 (L) 70 - 110 mg/dL Final   12/20/2020 <20 (L) 70 - 110 mg/dL Final   12/20/2020 <20 (L) 70 - 110 mg/dL Final   12/20/2020 74 70 - 110 mg/dL Final                UTI (urinary tract infection)  Current treatment: Ciprofloxacin.       Patient has a leukocytosis.  Last trend as follows-         Lab Results   Component Value Date     WBC 5.79 12/23/2020     WBC 7.66 12/22/2020     WBC 11.32 12/21/2020                  Blood Culture, Routine   Date Value Ref Range Status   12/19/2020 No Growth to date   Preliminary   12/19/2020 No Growth to date   Preliminary   12/19/2020 No Growth to date   Preliminary   12/19/2020 No Growth to date   Preliminary            Urine Culture, Routine   Date Value Ref Range Status   12/19/2020 KLEBSIELLA PNEUMONIAE  >100,000 cfu/ml   (A)   Final                  Acute renal failure        Lab Results   Component Value Date     CREATININE 2.7 (H) 12/23/2020     CREATININE 3.0 (H) 12/22/2020     CREATININE 3.2 (H) 12/21/2020      Will continue gentle IVF.       Will consult Nephrology.        Colitis  Current treatment: Ciprofloxacin and Flagyl.               VTE Risk Mitigation (From admission, onward)           Ordered        IP VTE HIGH RISK PATIENT  Once      12/19/20 0958        Place CAROLINE hose  Until discontinued      12/19/20 0958                     Discharge Planning   MARCELLE:      Code Status: Full Code   Is the patient medically ready for discharge?:     Reason for patient still in hospital (select all that apply): Treatment  Discharge Plan A: Home with family       cdiff colitis   ros    Plan :  Adv dieta s elsa  Continue with po vanc  Pt  Home soon

## 2020-12-27 LAB
ALBUMIN SERPL BCP-MCNC: 3 G/DL (ref 3.5–5.2)
ALP SERPL-CCNC: 103 U/L (ref 55–135)
ALT SERPL W/O P-5'-P-CCNC: 19 U/L (ref 10–44)
ANION GAP SERPL CALC-SCNC: 6 MMOL/L (ref 8–16)
AST SERPL-CCNC: 50 U/L (ref 10–40)
BASOPHILS # BLD AUTO: 0.08 K/UL (ref 0–0.2)
BASOPHILS NFR BLD: 1.1 % (ref 0–1.9)
BILIRUB SERPL-MCNC: 0.2 MG/DL (ref 0.1–1)
BUN SERPL-MCNC: 14 MG/DL (ref 8–23)
CALCIUM SERPL-MCNC: 8.4 MG/DL (ref 8.7–10.5)
CHLORIDE SERPL-SCNC: 101 MMOL/L (ref 95–110)
CO2 SERPL-SCNC: 30 MMOL/L (ref 23–29)
CREAT SERPL-MCNC: 2 MG/DL (ref 0.5–1.4)
DIFFERENTIAL METHOD: ABNORMAL
EOSINOPHIL # BLD AUTO: 0.5 K/UL (ref 0–0.5)
EOSINOPHIL NFR BLD: 6.4 % (ref 0–8)
ERYTHROCYTE [DISTWIDTH] IN BLOOD BY AUTOMATED COUNT: 17 % (ref 11.5–14.5)
EST. GFR  (AFRICAN AMERICAN): 27 ML/MIN/1.73 M^2
EST. GFR  (NON AFRICAN AMERICAN): 23.4 ML/MIN/1.73 M^2
GLUCOSE SERPL-MCNC: 104 MG/DL (ref 70–110)
HCT VFR BLD AUTO: 37.5 % (ref 37–48.5)
HGB BLD-MCNC: 11.8 G/DL (ref 12–16)
IMM GRANULOCYTES # BLD AUTO: 0.08 K/UL (ref 0–0.04)
IMM GRANULOCYTES NFR BLD AUTO: 1.1 % (ref 0–0.5)
LYMPHOCYTES # BLD AUTO: 1.8 K/UL (ref 1–4.8)
LYMPHOCYTES NFR BLD: 24.5 % (ref 18–48)
MCH RBC QN AUTO: 28.5 PG (ref 27–31)
MCHC RBC AUTO-ENTMCNC: 31.5 G/DL (ref 32–36)
MCV RBC AUTO: 91 FL (ref 82–98)
MONOCYTES # BLD AUTO: 1.3 K/UL (ref 0.3–1)
MONOCYTES NFR BLD: 18 % (ref 4–15)
NEUTROPHILS # BLD AUTO: 3.5 K/UL (ref 1.8–7.7)
NEUTROPHILS NFR BLD: 48.9 % (ref 38–73)
NRBC BLD-RTO: 0 /100 WBC
PLATELET # BLD AUTO: 253 K/UL (ref 150–350)
PMV BLD AUTO: 10.3 FL (ref 9.2–12.9)
POCT GLUCOSE: 108 MG/DL (ref 70–110)
POCT GLUCOSE: 85 MG/DL (ref 70–110)
POTASSIUM SERPL-SCNC: 3.9 MMOL/L (ref 3.5–5.1)
PROT SERPL-MCNC: 6.2 G/DL (ref 6–8.4)
RBC # BLD AUTO: 4.14 M/UL (ref 4–5.4)
SODIUM SERPL-SCNC: 137 MMOL/L (ref 136–145)
WBC # BLD AUTO: 7.22 K/UL (ref 3.9–12.7)

## 2020-12-27 PROCEDURE — 94761 N-INVAS EAR/PLS OXIMETRY MLT: CPT

## 2020-12-27 PROCEDURE — C9113 INJ PANTOPRAZOLE SODIUM, VIA: HCPCS | Performed by: INTERNAL MEDICINE

## 2020-12-27 PROCEDURE — 11000001 HC ACUTE MED/SURG PRIVATE ROOM

## 2020-12-27 PROCEDURE — S0030 INJECTION, METRONIDAZOLE: HCPCS | Performed by: EMERGENCY MEDICINE

## 2020-12-27 PROCEDURE — 99900035 HC TECH TIME PER 15 MIN (STAT)

## 2020-12-27 PROCEDURE — 80053 COMPREHEN METABOLIC PANEL: CPT

## 2020-12-27 PROCEDURE — 99900031 HC PATIENT EDUCATION (STAT)

## 2020-12-27 PROCEDURE — 25000003 PHARM REV CODE 250: Performed by: EMERGENCY MEDICINE

## 2020-12-27 PROCEDURE — 25000003 PHARM REV CODE 250: Performed by: INTERNAL MEDICINE

## 2020-12-27 PROCEDURE — 36415 COLL VENOUS BLD VENIPUNCTURE: CPT

## 2020-12-27 PROCEDURE — 63600175 PHARM REV CODE 636 W HCPCS: Performed by: INTERNAL MEDICINE

## 2020-12-27 PROCEDURE — 63600175 PHARM REV CODE 636 W HCPCS: Performed by: EMERGENCY MEDICINE

## 2020-12-27 PROCEDURE — 85025 COMPLETE CBC W/AUTO DIFF WBC: CPT

## 2020-12-27 RX ADMIN — METRONIDAZOLE 500 MG: 500 INJECTION, SOLUTION INTRAVENOUS at 11:12

## 2020-12-27 RX ADMIN — OXYCODONE AND ACETAMINOPHEN 1 TABLET: 325; 10 TABLET ORAL at 07:12

## 2020-12-27 RX ADMIN — VANCOMYCIN HYDROCHLORIDE 125 MG: KIT at 05:12

## 2020-12-27 RX ADMIN — METRONIDAZOLE 500 MG: 500 INJECTION, SOLUTION INTRAVENOUS at 03:12

## 2020-12-27 RX ADMIN — VANCOMYCIN HYDROCHLORIDE 125 MG: KIT at 12:12

## 2020-12-27 RX ADMIN — SUCRALFATE 1 G: 1 TABLET ORAL at 08:12

## 2020-12-27 RX ADMIN — GABAPENTIN 100 MG: 100 CAPSULE ORAL at 08:12

## 2020-12-27 RX ADMIN — METRONIDAZOLE 500 MG: 500 INJECTION, SOLUTION INTRAVENOUS at 07:12

## 2020-12-27 RX ADMIN — OXYCODONE AND ACETAMINOPHEN 1 TABLET: 325; 10 TABLET ORAL at 02:12

## 2020-12-27 RX ADMIN — MONTELUKAST 10 MG: 10 TABLET, FILM COATED ORAL at 08:12

## 2020-12-27 RX ADMIN — POTASSIUM CHLORIDE AND SODIUM CHLORIDE 75 ML/HR: 900; 150 INJECTION, SOLUTION INTRAVENOUS at 03:12

## 2020-12-27 RX ADMIN — SUCRALFATE 1 G: 1 TABLET ORAL at 01:12

## 2020-12-27 RX ADMIN — SUCRALFATE 1 G: 1 TABLET ORAL at 05:12

## 2020-12-27 RX ADMIN — VANCOMYCIN HYDROCHLORIDE 125 MG: KIT at 11:12

## 2020-12-27 RX ADMIN — PANTOPRAZOLE SODIUM 40 MG: 40 INJECTION, POWDER, FOR SOLUTION INTRAVENOUS at 09:12

## 2020-12-27 RX ADMIN — METRONIDAZOLE 500 MG: 500 INJECTION, SOLUTION INTRAVENOUS at 12:12

## 2020-12-27 RX ADMIN — ONDANSETRON 4 MG: 2 INJECTION INTRAMUSCULAR; INTRAVENOUS at 06:12

## 2020-12-27 RX ADMIN — OXYCODONE AND ACETAMINOPHEN 1 TABLET: 325; 10 TABLET ORAL at 08:12

## 2020-12-27 NOTE — PROGRESS NOTES
Principal Problem:Septic shock           HPI:  No notes on file     Overview/Hospital Course:  12/21/2020:  Patient states that overall she feels slightly better.  She has experienced an episode of hypoglycemia.  I plan to transition the patient's fluid to D5W.  She has been given an amp of bicarb.  We will continue close ICU monitoring.  She is requiring very minimal levo fed.  Will discontinue once mean arterial pressures remained stable.     12/22/2020:  No acute events overnight.  Patient endorses improvement in abdominal pain.  Now off pressors.  Still having episodes of hypoglycemia.  Continue close ICU monitoring.       12/23/2020: No acute events overnight.  Patient is more anemic this morning and still having some bloody bowel movements.  Will provide 2 units packed red blood cells and continue inpatient monitoring.  Her hypoglycemia appears to be improving.    12/24 : seen and examined, tolerating po, cdiff positive, off of pressors, renal functions improving    12/27 : madonna nd examined, kidney functions continues to improve     Interval History: a few bloody stools overnight     Review of Systems   Constitutional: Positive for appetite change and fatigue.   Gastrointestinal: Positive for abdominal pain, diarrhea and nausea. Negative for blood in stool and vomiting.   Musculoskeletal: Positive for back pain.   Neurological: Positive for weakness and light-headedness.   Psychiatric/Behavioral: Positive for sleep disturbance. Negative for agitation.   All other systems reviewed and are negative.     Objective:      Vitals:    12/27/20 0439 12/27/20 0730 12/27/20 0744 12/27/20 0752   BP: (!) 158/106  (!) 162/90    BP Location: Left arm  Left arm    Patient Position: Lying      Pulse: 86 87 90    Resp: 12 18 16 20   Temp: 98.4 °F (36.9 °C)  98.7 °F (37.1 °C)    TempSrc: Oral  Oral    SpO2: 95% 95% 95%    Weight:       Height:           Weight: 88.5 kg (195 lb 1.7 oz)  Body mass index is 31.49  kg/m².     Intake/Output Summary (Last 24 hours) at 12/23/2020 0904  Last data filed at 12/23/2020 0600      Gross per 24 hour   Intake 3120 ml   Output --   Net 3120 ml      Physical Exam  Vitals signs and nursing note reviewed.   Constitutional:       General: She is in acute distress.      Appearance: She is obese. She is ill-appearing.   HENT:      Head: Normocephalic and atraumatic.      Mouth/Throat:      Mouth: Mucous membranes are dry.      Pharynx: Oropharynx is clear.   Eyes:      Extraocular Movements: Extraocular movements intact.      Pupils: Pupils are equal, round, and reactive to light.   Neck:      Musculoskeletal: Neck supple.   Cardiovascular:      Rate and Rhythm: Normal rate and regular rhythm.      Heart sounds: Normal heart sounds. No murmur. No gallop.    Pulmonary:      Effort: Pulmonary effort is normal. No respiratory distress.      Breath sounds: Normal breath sounds. No stridor. No wheezing or rales.   Abdominal:      General: Bowel sounds are normal. There is no distension.      Palpations: Abdomen is soft.      Tenderness: There is abdominal tenderness (suprapubic and LLQ).   Musculoskeletal: Normal range of motion.         General: No swelling.   Skin:     General: Skin is warm and dry.      Coloration: Skin is pale.   Neurological:      General: No focal deficit present.      Mental Status: She is alert. Mental status is at baseline. She is disoriented.      Motor: Weakness present.   Psychiatric:         Mood and Affect: Mood normal.            Significant Labs: All pertinent labs within the past 24 hours have been reviewed.     Lab Results   Component Value Date    WBC 7.22 12/27/2020    HGB 11.8 (L) 12/27/2020    HCT 37.5 12/27/2020    MCV 91 12/27/2020     12/27/2020       Recent Labs   Lab 12/27/20  0526      K 3.9      CO2 30*   BUN 14   CREATININE 2.0*   CALCIUM 8.4*       Significant Imaging: I have reviewed and interpreted all pertinent imaging  results/findings within the past 24 hours.        Assessment/Plan:      * Septic shock  This is secondary to colitis.  Current treatment includes metronidazole and ciprofloxacin.  Overall symptomatic improvement noted.     No off levophed.               Diabetes mellitus, type 2  Last A1c reviewed- No results found for: LABA1C, HGBA1C  Most recent fingerstick glucose reviewed-          Recent Labs   Lab 12/22/20  1219 12/22/20  1735 12/22/20  2336 12/23/20  0529   POCTGLUCOSE 109 65* 98 89      Current correctional scale  Low  Maintain anti-hyperglycemic dose as follows-              Antihyperglycemics (From admission, onward)     Start     Stop Route Frequency Ordered     12/20/20 0238   insulin aspart U-100 pen 0-5 Units      -- SubQ Every 6 hours PRN 12/20/20 0141          Hold Oral hypoglycemics while patient is in the hospital.           Hypoglycemia  Current treatment: D5W at 100 cc/h.  Will provide amp of D50 as needed.keep chronic discomfortkeep           POCT Glucose   Date Value Ref Range Status   12/23/2020 89 70 - 110 mg/dL Final   12/22/2020 98 70 - 110 mg/dL Final   12/22/2020 65 (L) 70 - 110 mg/dL Final   12/22/2020 109 70 - 110 mg/dL Final   12/22/2020 153 (H) 70 - 110 mg/dL Final   12/22/2020 <20 (L) 70 - 110 mg/dL Final   12/22/2020 <20 (L) 70 - 110 mg/dL Final   12/22/2020 27 (LL) 70 - 110 mg/dL Final   12/22/2020 88 70 - 110 mg/dL Final   12/21/2020 60 (L) 70 - 110 mg/dL Final   12/21/2020 83 70 - 110 mg/dL Final   12/21/2020 25 (LL) 70 - 110 mg/dL Final   12/21/2020 44 (LL) 70 - 110 mg/dL Final   12/21/2020 106 70 - 110 mg/dL Final   12/21/2020 59 (L) 70 - 110 mg/dL Final   12/21/2020 27 (LL) 70 - 110 mg/dL Final   12/21/2020 83 70 - 110 mg/dL Final   12/20/2020 118 (H) 70 - 110 mg/dL Final   12/20/2020 <20 (L) 70 - 110 mg/dL Final   12/20/2020 <20 (L) 70 - 110 mg/dL Final   12/20/2020 <20 (L) 70 - 110 mg/dL Final   12/20/2020 <20 (L) 70 - 110 mg/dL Final   12/20/2020 74 70 - 110 mg/dL Final                UTI (urinary tract infection)  Current treatment: Ciprofloxacin.       Patient has a leukocytosis.  Last trend as follows-         Lab Results   Component Value Date     WBC 5.79 12/23/2020     WBC 7.66 12/22/2020     WBC 11.32 12/21/2020                  Blood Culture, Routine   Date Value Ref Range Status   12/19/2020 No Growth to date   Preliminary   12/19/2020 No Growth to date   Preliminary   12/19/2020 No Growth to date   Preliminary   12/19/2020 No Growth to date   Preliminary            Urine Culture, Routine   Date Value Ref Range Status   12/19/2020 KLEBSIELLA PNEUMONIAE  >100,000 cfu/ml   (A)   Final                  Acute renal failure        Lab Results   Component Value Date     CREATININE 2.7 (H) 12/23/2020     CREATININE 3.0 (H) 12/22/2020     CREATININE 3.2 (H) 12/21/2020      Will continue gentle IVF.       Will consult Nephrology.        Colitis  Current treatment: Ciprofloxacin and Flagyl.               VTE Risk Mitigation (From admission, onward)           Ordered        IP VTE HIGH RISK PATIENT  Once      12/19/20 0958        Place CAROLINE hose  Until discontinued      12/19/20 0958                     Discharge Planning   MARCELLE:      Code Status: Full Code   Is the patient medically ready for discharge?:     Reason for patient still in hospital (select all that apply): Treatment  Discharge Plan A: Home with family       cdiff colitis   ros    Plan :  Adv dieta s elsa  Continue with po vanc  Pt  Home soon in am on po vanc

## 2020-12-28 LAB
ALBUMIN SERPL BCP-MCNC: 3 G/DL (ref 3.5–5.2)
ALP SERPL-CCNC: 99 U/L (ref 55–135)
ALT SERPL W/O P-5'-P-CCNC: 16 U/L (ref 10–44)
ANION GAP SERPL CALC-SCNC: 4 MMOL/L (ref 8–16)
AST SERPL-CCNC: 40 U/L (ref 10–40)
BASOPHILS # BLD AUTO: 0.09 K/UL (ref 0–0.2)
BASOPHILS NFR BLD: 1.2 % (ref 0–1.9)
BILIRUB SERPL-MCNC: 0.3 MG/DL (ref 0.1–1)
BUN SERPL-MCNC: 13 MG/DL (ref 8–23)
CALCIUM SERPL-MCNC: 8.5 MG/DL (ref 8.7–10.5)
CHLORIDE SERPL-SCNC: 102 MMOL/L (ref 95–110)
CO2 SERPL-SCNC: 30 MMOL/L (ref 23–29)
CREAT SERPL-MCNC: 2 MG/DL (ref 0.5–1.4)
DIFFERENTIAL METHOD: ABNORMAL
EOSINOPHIL # BLD AUTO: 0.5 K/UL (ref 0–0.5)
EOSINOPHIL NFR BLD: 6.7 % (ref 0–8)
ERYTHROCYTE [DISTWIDTH] IN BLOOD BY AUTOMATED COUNT: 17.1 % (ref 11.5–14.5)
EST. GFR  (AFRICAN AMERICAN): 27 ML/MIN/1.73 M^2
EST. GFR  (NON AFRICAN AMERICAN): 23.4 ML/MIN/1.73 M^2
GLUCOSE SERPL-MCNC: 103 MG/DL (ref 70–110)
HCT VFR BLD AUTO: 38.3 % (ref 37–48.5)
HGB BLD-MCNC: 12.1 G/DL (ref 12–16)
IMM GRANULOCYTES # BLD AUTO: 0.07 K/UL (ref 0–0.04)
IMM GRANULOCYTES NFR BLD AUTO: 1 % (ref 0–0.5)
LYMPHOCYTES # BLD AUTO: 1.6 K/UL (ref 1–4.8)
LYMPHOCYTES NFR BLD: 22.2 % (ref 18–48)
MCH RBC QN AUTO: 28.8 PG (ref 27–31)
MCHC RBC AUTO-ENTMCNC: 31.6 G/DL (ref 32–36)
MCV RBC AUTO: 91 FL (ref 82–98)
MONOCYTES # BLD AUTO: 1 K/UL (ref 0.3–1)
MONOCYTES NFR BLD: 13.3 % (ref 4–15)
NEUTROPHILS # BLD AUTO: 4 K/UL (ref 1.8–7.7)
NEUTROPHILS NFR BLD: 55.6 % (ref 38–73)
NRBC BLD-RTO: 0 /100 WBC
PLATELET # BLD AUTO: 290 K/UL (ref 150–350)
PMV BLD AUTO: 9.8 FL (ref 9.2–12.9)
POCT GLUCOSE: 72 MG/DL (ref 70–110)
POCT GLUCOSE: 76 MG/DL (ref 70–110)
POCT GLUCOSE: 95 MG/DL (ref 70–110)
POTASSIUM SERPL-SCNC: 4.2 MMOL/L (ref 3.5–5.1)
PROT SERPL-MCNC: 6.2 G/DL (ref 6–8.4)
RBC # BLD AUTO: 4.2 M/UL (ref 4–5.4)
SODIUM SERPL-SCNC: 136 MMOL/L (ref 136–145)
WBC # BLD AUTO: 7.21 K/UL (ref 3.9–12.7)

## 2020-12-28 PROCEDURE — 36415 COLL VENOUS BLD VENIPUNCTURE: CPT

## 2020-12-28 PROCEDURE — 63600175 PHARM REV CODE 636 W HCPCS: Performed by: INTERNAL MEDICINE

## 2020-12-28 PROCEDURE — 63600175 PHARM REV CODE 636 W HCPCS: Performed by: EMERGENCY MEDICINE

## 2020-12-28 PROCEDURE — 25000003 PHARM REV CODE 250: Performed by: INTERNAL MEDICINE

## 2020-12-28 PROCEDURE — S0030 INJECTION, METRONIDAZOLE: HCPCS | Performed by: EMERGENCY MEDICINE

## 2020-12-28 PROCEDURE — 11000001 HC ACUTE MED/SURG PRIVATE ROOM

## 2020-12-28 PROCEDURE — 97116 GAIT TRAINING THERAPY: CPT

## 2020-12-28 PROCEDURE — 99900031 HC PATIENT EDUCATION (STAT)

## 2020-12-28 PROCEDURE — 85025 COMPLETE CBC W/AUTO DIFF WBC: CPT

## 2020-12-28 PROCEDURE — 99900035 HC TECH TIME PER 15 MIN (STAT)

## 2020-12-28 PROCEDURE — 80053 COMPREHEN METABOLIC PANEL: CPT

## 2020-12-28 PROCEDURE — 97530 THERAPEUTIC ACTIVITIES: CPT

## 2020-12-28 PROCEDURE — C9113 INJ PANTOPRAZOLE SODIUM, VIA: HCPCS | Performed by: INTERNAL MEDICINE

## 2020-12-28 PROCEDURE — 94761 N-INVAS EAR/PLS OXIMETRY MLT: CPT

## 2020-12-28 PROCEDURE — 25000003 PHARM REV CODE 250: Performed by: EMERGENCY MEDICINE

## 2020-12-28 RX ORDER — CARVEDILOL 3.12 MG/1
3.12 TABLET ORAL 2 TIMES DAILY
Status: DISCONTINUED | OUTPATIENT
Start: 2020-12-28 | End: 2020-01-01 | Stop reason: HOSPADM

## 2020-12-28 RX ADMIN — PANTOPRAZOLE SODIUM 40 MG: 40 INJECTION, POWDER, FOR SOLUTION INTRAVENOUS at 08:12

## 2020-12-28 RX ADMIN — MONTELUKAST 10 MG: 10 TABLET, FILM COATED ORAL at 08:12

## 2020-12-28 RX ADMIN — POTASSIUM CHLORIDE AND SODIUM CHLORIDE 75 ML/HR: 900; 150 INJECTION, SOLUTION INTRAVENOUS at 08:12

## 2020-12-28 RX ADMIN — OXYCODONE AND ACETAMINOPHEN 1 TABLET: 325; 10 TABLET ORAL at 09:12

## 2020-12-28 RX ADMIN — PANTOPRAZOLE SODIUM 40 MG: 40 INJECTION, POWDER, FOR SOLUTION INTRAVENOUS at 09:12

## 2020-12-28 RX ADMIN — VANCOMYCIN HYDROCHLORIDE 125 MG: KIT at 01:12

## 2020-12-28 RX ADMIN — OXYCODONE AND ACETAMINOPHEN 1 TABLET: 325; 10 TABLET ORAL at 12:12

## 2020-12-28 RX ADMIN — VANCOMYCIN HYDROCHLORIDE 125 MG: KIT at 05:12

## 2020-12-28 RX ADMIN — GABAPENTIN 100 MG: 100 CAPSULE ORAL at 08:12

## 2020-12-28 RX ADMIN — METRONIDAZOLE 500 MG: 500 INJECTION, SOLUTION INTRAVENOUS at 03:12

## 2020-12-28 RX ADMIN — OXYCODONE AND ACETAMINOPHEN 1 TABLET: 325; 10 TABLET ORAL at 01:12

## 2020-12-28 RX ADMIN — ONDANSETRON 4 MG: 2 INJECTION INTRAMUSCULAR; INTRAVENOUS at 12:12

## 2020-12-28 RX ADMIN — CARVEDILOL 3.12 MG: 3.12 TABLET, FILM COATED ORAL at 08:12

## 2020-12-28 RX ADMIN — SUCRALFATE 1 G: 1 TABLET ORAL at 05:12

## 2020-12-28 RX ADMIN — ONDANSETRON 4 MG: 2 INJECTION INTRAMUSCULAR; INTRAVENOUS at 04:12

## 2020-12-28 RX ADMIN — SUCRALFATE 1 G: 1 TABLET ORAL at 08:12

## 2020-12-28 RX ADMIN — SUCRALFATE 1 G: 1 TABLET ORAL at 12:12

## 2020-12-28 RX ADMIN — OXYCODONE AND ACETAMINOPHEN 1 TABLET: 325; 10 TABLET ORAL at 05:12

## 2020-12-28 RX ADMIN — METRONIDAZOLE 500 MG: 500 INJECTION, SOLUTION INTRAVENOUS at 08:12

## 2020-12-28 NOTE — PLAN OF CARE
"CM contacted patient's son Mr. Sargent to inform him of dc plan to [facility].  Informed him that CM spoke to Dr. Allison who states to consult [facility].  Informed Mr. Sargent that I had submitted the referral to [facility] but they do not believe patient can tolerate 3 hrs of therapy, per Deborah RN but they will evaluate again today and she will let me know.  CM did inform patient's son about that and inquired if patient does not meet criteria for [facility] if he had a second plan.  We discussed nursing home placement as he states he is unable to care for patient at home.  Mr. Sargent then became very upset and starts telling CM that he does not agree w/any discharge until he knows for sure what is wrong with his mom.  He demands a biopsy be done of a "mass that is pressing against her colon that is causing explosive diarrhea."  VERO inquired if he has been updated from the physician and he said, "Yes, but I'm fixing to call Dr. Allison again, right now."  CM then provided patient's son w/CM contact information and informed him to call me back w/any questions or concerns after discussing plan w/Dr. Allison.  "

## 2020-12-28 NOTE — PROGRESS NOTES
[facility] Medicine  Progress Note    Patient Name: Smiley Harmon  MRN: [id number]  Patient Class: IP- Inpatient   Admission Date: 12/19/2020  Length of Stay: 9 days  Attending Physician: Osmany Allison Jr., MD  Primary Care Provider: Yani Carlos MD        Subjective:     Principal Problem:Septic shock        HPI:  No notes on file    Overview/Hospital Course:  12/21/2020:  Patient states that overall she feels slightly better.  She has experienced an episode of hypoglycemia.  I plan to transition the patient's fluid to D5W.  She has been given an amp of bicarb.  We will continue close ICU monitoring.  She is requiring very minimal levo fed.  Will discontinue once mean arterial pressures remained stable.    12/22/2020:  No acute events overnight.  Patient endorses improvement in abdominal pain.  Now off pressors.  Still having episodes of hypoglycemia.  Continue close ICU monitoring.      12/23/2020: No acute events overnight.  Patient is more anemic this morning and still having some bloody bowel movements.  Will provide 2 units packed red blood cells and continue inpatient monitoring.  Her hypoglycemia appears to be improving.    12/28/2020: No acute events overnight.  Patient still remains weak.  I encouraged her to work with physical and occupational therapy as she is too weak to go home at present.  We discussed the potential option in the future for skilled nursing versus inpatient rehab when she is medically cleared.    Interval History: No acute events overnight.    Review of Systems   Constitutional: Positive for appetite change and fatigue.   Gastrointestinal: Positive for abdominal pain, diarrhea and nausea. Negative for blood in stool and vomiting.   Musculoskeletal: Positive for back pain.   Neurological: Positive for weakness and light-headedness.   Psychiatric/Behavioral: Positive for sleep disturbance. Negative for agitation.   All other systems reviewed and are  negative.    Objective:     Vital Signs (Most Recent):  Temp: 97.6 °F (36.4 °C) (12/28/20 1110)  Pulse: 84 (12/28/20 1110)  Resp: 20 (12/28/20 1110)  BP: (!) 158/98 (12/28/20 1139)  SpO2: 98 % (12/28/20 1110) Vital Signs (24h Range):  Temp:  [97.6 °F (36.4 °C)-98.6 °F (37 °C)] 97.6 °F (36.4 °C)  Pulse:  [81-84] 84  Resp:  [14-20] 20  SpO2:  [96 %-99 %] 98 %  BP: (139-177)/() 158/98     Weight: 88.5 kg (195 lb 1.7 oz)  Body mass index is 31.49 kg/m².    Intake/Output Summary (Last 24 hours) at 12/28/2020 1140  Last data filed at 12/28/2020 0800  Gross per 24 hour   Intake 2480 ml   Output 1200 ml   Net 1280 ml      Physical Exam  Vitals signs and nursing note reviewed.   Constitutional:       General: She is in acute distress.      Appearance: She is obese. She is ill-appearing.   HENT:      Head: Normocephalic and atraumatic.      Mouth/Throat:      Mouth: Mucous membranes are dry.      Pharynx: Oropharynx is clear.   Eyes:      Extraocular Movements: Extraocular movements intact.      Pupils: Pupils are equal, round, and reactive to light.   Neck:      Musculoskeletal: Neck supple.   Cardiovascular:      Rate and Rhythm: Normal rate and regular rhythm.      Heart sounds: Normal heart sounds. No murmur. No gallop.    Pulmonary:      Effort: Pulmonary effort is normal. No respiratory distress.      Breath sounds: Normal breath sounds. No stridor. No wheezing or rales.   Abdominal:      General: Bowel sounds are normal. There is no distension.      Palpations: Abdomen is soft.      Tenderness: There is abdominal tenderness (suprapubic and LLQ).   Musculoskeletal: Normal range of motion.         General: No swelling.   Skin:     General: Skin is warm and dry.      Coloration: Skin is pale.   Neurological:      General: No focal deficit present.      Mental Status: She is alert. Mental status is at baseline. She is disoriented.      Motor: Weakness present.   Psychiatric:         Mood and Affect: Mood normal.          Significant Labs: All pertinent labs within the past 24 hours have been reviewed.    Significant Imaging: I have reviewed and interpreted all pertinent imaging results/findings within the past 24 hours.      Assessment/Plan:      * Septic shock  This is secondary to colitis.  Current treatment includes metronidazole and ciprofloxacin.  Overall symptomatic improvement noted.    No off levophed.           Diabetes mellitus, type 2  Last A1c reviewed- No results found for: LABA1C, HGBA1C  Most recent fingerstick glucose reviewed-   Recent Labs   Lab 12/27/20 2034 12/28/20  1116   POCTGLUCOSE 85 95     Current correctional scale  Low  Maintain anti-hyperglycemic dose as follows-   Antihyperglycemics (From admission, onward)    Start     Stop Route Frequency Ordered    12/20/20 0238  insulin aspart U-100 pen 0-5 Units      -- SubQ Every 6 hours PRN 12/20/20 0141        Hold Oral hypoglycemics while patient is in the hospital.        Hypoglycemia  Resolution noted now that the patient is tolerating by mouth intake.    POCT Glucose   Date Value Ref Range Status   12/28/2020 95 70 - 110 mg/dL Final   12/27/2020 85 70 - 110 mg/dL Final   12/27/2020 108 70 - 110 mg/dL Final   12/26/2020 340 (H) 70 - 110 mg/dL Final   12/25/2020 112 (H) 70 - 110 mg/dL Final            UTI (urinary tract infection)  Current treatment: s/p course of cipro.      Patient has a leukocytosis.  Last trend as follows-   Lab Results   Component Value Date    WBC 7.21 12/28/2020    WBC 7.22 12/27/2020    WBC 8.17 12/26/2020         Blood Culture, Routine   Date Value Ref Range Status   12/19/2020 No growth after 5 days.  Final     Urine Culture, Routine   Date Value Ref Range Status   12/19/2020 KLEBSIELLA PNEUMONIAE  >100,000 cfu/ml   (A)  Final              Acute renal failure  Lab Results   Component Value Date    CREATININE 2.0 (H) 12/28/2020    CREATININE 2.0 (H) 12/27/2020    CREATININE 1.9 (H) 12/26/2020      Will continue gentle IVF.       Nephrology on board.      Colitis  C. Difficile is positive.  Current treatment: Flagyl/oral vancomycin.        VTE Risk Mitigation (From admission, onward)         Ordered     IP VTE HIGH RISK PATIENT  Once      12/19/20 0958     Place CAROLINE hose  Until discontinued      12/19/20 0958                Discharge Planning   MARCELLE:      Code Status: Full Code   Is the patient medically ready for discharge?:     Reason for patient still in hospital (select all that apply): Treatment  Discharge Plan A: Home with family   Discharge Delays: None known at this time              Osmany Allison Jr, MD  Department of Hospital Medicine   [facility]

## 2020-12-28 NOTE — PLAN OF CARE
"   12/28/20 1342   Discharge Reassessment   Assessment Type Discharge Planning Reassessment   Anticipated Discharge Disposition Rehab   Discharge Plan A Rehab   Discharge Plan B Skilled Nursing Facility     Discharged plan discussed w/patient's son who states plans are to get patient to [facility] or possibly SNF but wants patient to have biopsy of "Colon mass" she has.    "

## 2020-12-28 NOTE — ASSESSMENT & PLAN NOTE
Last A1c reviewed- No results found for: LABA1C, HGBA1C  Most recent fingerstick glucose reviewed-   Recent Labs   Lab 12/27/20 2034 12/28/20  1116   POCTGLUCOSE 85 95     Current correctional scale  Low  Maintain anti-hyperglycemic dose as follows-   Antihyperglycemics (From admission, onward)    Start     Stop Route Frequency Ordered    12/20/20 0238  insulin aspart U-100 pen 0-5 Units      -- SubQ Every 6 hours PRN 12/20/20 0141        Hold Oral hypoglycemics while patient is in the hospital.

## 2020-12-28 NOTE — SUBJECTIVE & OBJECTIVE
Interval History: No acute events overnight.    Review of Systems   Constitutional: Positive for appetite change and fatigue.   Gastrointestinal: Positive for abdominal pain, diarrhea and nausea. Negative for blood in stool and vomiting.   Musculoskeletal: Positive for back pain.   Neurological: Positive for weakness and light-headedness.   Psychiatric/Behavioral: Positive for sleep disturbance. Negative for agitation.   All other systems reviewed and are negative.    Objective:     Vital Signs (Most Recent):  Temp: 97.6 °F (36.4 °C) (12/28/20 1110)  Pulse: 84 (12/28/20 1110)  Resp: 20 (12/28/20 1110)  BP: (!) 158/98 (12/28/20 1139)  SpO2: 98 % (12/28/20 1110) Vital Signs (24h Range):  Temp:  [97.6 °F (36.4 °C)-98.6 °F (37 °C)] 97.6 °F (36.4 °C)  Pulse:  [81-84] 84  Resp:  [14-20] 20  SpO2:  [96 %-99 %] 98 %  BP: (139-177)/() 158/98     Weight: 88.5 kg (195 lb 1.7 oz)  Body mass index is 31.49 kg/m².    Intake/Output Summary (Last 24 hours) at 12/28/2020 1140  Last data filed at 12/28/2020 0800  Gross per 24 hour   Intake 2480 ml   Output 1200 ml   Net 1280 ml      Physical Exam  Vitals signs and nursing note reviewed.   Constitutional:       General: She is in acute distress.      Appearance: She is obese. She is ill-appearing.   HENT:      Head: Normocephalic and atraumatic.      Mouth/Throat:      Mouth: Mucous membranes are dry.      Pharynx: Oropharynx is clear.   Eyes:      Extraocular Movements: Extraocular movements intact.      Pupils: Pupils are equal, round, and reactive to light.   Neck:      Musculoskeletal: Neck supple.   Cardiovascular:      Rate and Rhythm: Normal rate and regular rhythm.      Heart sounds: Normal heart sounds. No murmur. No gallop.    Pulmonary:      Effort: Pulmonary effort is normal. No respiratory distress.      Breath sounds: Normal breath sounds. No stridor. No wheezing or rales.   Abdominal:      General: Bowel sounds are normal. There is no distension.      Palpations:  Abdomen is soft.      Tenderness: There is abdominal tenderness (suprapubic and LLQ).   Musculoskeletal: Normal range of motion.         General: No swelling.   Skin:     General: Skin is warm and dry.      Coloration: Skin is pale.   Neurological:      General: No focal deficit present.      Mental Status: She is alert. Mental status is at baseline. She is disoriented.      Motor: Weakness present.   Psychiatric:         Mood and Affect: Mood normal.         Significant Labs: All pertinent labs within the past 24 hours have been reviewed.    Significant Imaging: I have reviewed and interpreted all pertinent imaging results/findings within the past 24 hours.

## 2020-12-28 NOTE — ASSESSMENT & PLAN NOTE
Lab Results   Component Value Date    CREATININE 2.0 (H) 12/28/2020    CREATININE 2.0 (H) 12/27/2020    CREATININE 1.9 (H) 12/26/2020      Will continue gentle IVF.      Nephrology on board.

## 2020-12-28 NOTE — ASSESSMENT & PLAN NOTE
Current treatment: s/p course of cipro.      Patient has a leukocytosis.  Last trend as follows-   Lab Results   Component Value Date    WBC 7.21 12/28/2020    WBC 7.22 12/27/2020    WBC 8.17 12/26/2020         Blood Culture, Routine   Date Value Ref Range Status   12/19/2020 No growth after 5 days.  Final     Urine Culture, Routine   Date Value Ref Range Status   12/19/2020 KLEBSIELLA PNEUMONIAE  >100,000 cfu/ml   (A)  Final

## 2020-12-28 NOTE — PT/OT/SLP PROGRESS
"Physical Therapy         Treatment        Smiley Harmon   MRN: [id number]        Start time: 1025  Stop time: 1055      Billable Minutes:  Gait Wavintui52 minutes and Therapeutic Activity 15 minutes  Total Minutes: 30 minutes                   General Precautions: Standard, contact, fall; contact for C-diff    Spiritual, Cultural Beliefs, Voodoo Practices, Values that Affect Care: no    Subjective:  Communicated with nurse prior to and after session.     pain : pt denies pain    "What do you want?!"; pt pretty grumpy throughout but participated at her own pace and nothing more    Objective:  Patient found supine in bed, hesitantly agreed to participate with minimal coaxing      Functional Mobility:  Bed Mobility:   Supine to sit: Moderate Assistance for trunk elevation   Sit to supine: Minimal Assistance for LE elevation   Rolling: Minimal Assistance   Scooting: Moderate Assistance in sitting to edge of bed    Balance:   Static Sit: FAIR+: Able to take MINIMAL challenges from all directions  Dynamic Sit:  FAIR+: Maintains balance through MINIMAL excursions of active trunk motion  Static Stand: FAIR: Maintains without assist but unable to take challenges using RW  Dynamic stand: FAIR: Needs CONTACT GUARD during gait using RW with mod vc's for safety    Transfer Training:  Sit to stand:Minimal Assistance with Rolling Walker vc's for safety/technique  Bed <> Chair:  Stand Pivot and Step Transfer with Minimal Assistance with Rolling Walker with mod vc's for safety/backing up all the way to surface prior to sitting      Gait Training:  Performed gait training in room using RW 2o' x 2 trials with seated rest break on couch; contact guard     Stair Training:  NA      Additional Treatment:  Pt requested ice cream; set up to eat her ice cream sitting up in bed post treatment with all needs in reach    Activity Tolerance:  tolerated fair, pt would only perform what she wanted to and nothing more    Patient left HOB " elevated with all lines intact and call button in reach.    Assessment:  Smiley Harmon is a 80 y.o. female with a medical diagnosis of Septic shock. She presents with decreased strength/mobility but also limited participation to only what pt is willing to do which is minimal.    Rehab potential is fair.    Activity tolerance: Fair    Discharge recommendations:   SNF    Equipment recommendations:       GOALS:   Multidisciplinary Problems     Physical Therapy Goals        Problem: Physical Therapy Goal    Goal Priority Disciplines Outcome Goal Variances Interventions   Physical Therapy Goal     PT, PT/OT      Description: Goals to be met by: 1/7/2021     Patient will increase functional independence with mobility by performing:     Supine to sit with Contact Guard Assistance   Sit to supine with Contact Guard Assistance   Rolling to Left and Right with Contact Guard Assistance.   Sit to stand transfer with Minimal Assistance   Gait  x 25 feet with Minimal Assistance using Rolling Walker.                    PLAN:    Patient to be seen (up to 12 times per week)  to address the above listed problems via gait training, therapeutic activities, therapeutic exercises  Plan of Care expires: 01/07/21  Plan of Care reviewed with: patient         Leona Lewis, PT 12/28/2020

## 2020-12-28 NOTE — ASSESSMENT & PLAN NOTE
Resolution noted now that the patient is tolerating by mouth intake.    POCT Glucose   Date Value Ref Range Status   12/28/2020 95 70 - 110 mg/dL Final   12/27/2020 85 70 - 110 mg/dL Final   12/27/2020 108 70 - 110 mg/dL Final   12/26/2020 340 (H) 70 - 110 mg/dL Final   12/25/2020 112 (H) 70 - 110 mg/dL Final

## 2020-12-29 NOTE — ASSESSMENT & PLAN NOTE
Resolution noted now that the patient is tolerating by mouth intake.    POCT Glucose   Date Value Ref Range Status   12/29/2020 75 70 - 110 mg/dL Final   12/28/2020 76 70 - 110 mg/dL Final   12/28/2020 72 70 - 110 mg/dL Final   12/28/2020 95 70 - 110 mg/dL Final   12/27/2020 85 70 - 110 mg/dL Final   12/27/2020 108 70 - 110 mg/dL Final   12/26/2020 340 (H) 70 - 110 mg/dL Final

## 2020-12-29 NOTE — PHYSICIAN QUERY
PT Name: Smiley Harmon  MR #: [id number]    HEMATOLOGY CLARIFICATION      CDS: Sonia Evans RN, CCDS         Contact information :ext [phone] [email or web address]     This form is a permanent document in the medical record.      Query Date: December 29, 2020    By submitting this query, we are merely seeking further clarification of documentation. Please utilize your independent clinical judgment when addressing the question(s) below.    The Medical Record contains the following:   Indicators  Supporting Clinical Findings Location in Medical Record   x Anemia documented Patient is more anemic this morning and still having some bloody bowel movements.  Hospital Med PN 12/23/20   x H&H H/H 10.8/34.5  H/H  7.6/24.7  H/H  7.4/23.1  H/H  11.3/36.9   Lab 12/19/20  Lab 12/20/20  Lab 12/23/20  Lab 12/26/20      BP                    HR      GI bleeding documented      Acute bleeding (Non GI site)     x Transfusion(s) Transfused PRBC Blood bank result 12/20/20    Acute/Chronic illness      Treatments     x Other Positive for blood in stool and diarrhea.  Occult blood x 1, stool   ED Provider note 12/19/20    Lab 12/29/20     Provider, please specify diagnosis or diagnoses associated with above clinical findings.   [ x  ] Acute blood loss anemia    [   ] Chronic blood loss anemia     [   ] Anemia due to chronic disease (please specify): _________________   [   ] Anemia, unspecified    [   ] Other Hematological Diagnosis (please specify): _________________   [   ] Clinically Undetermined               Please document in your progress notes daily for the duration of treatment, until resolved, and include in your discharge summary.    Form No. [id number]

## 2020-12-29 NOTE — ASSESSMENT & PLAN NOTE
Lab Results   Component Value Date    CREATININE 2.1 (H) 12/29/2020    CREATININE 2.0 (H) 12/28/2020    CREATININE 2.0 (H) 12/27/2020      Will continue gentle IVF.      Nephrology on board.

## 2020-12-29 NOTE — SUBJECTIVE & OBJECTIVE
Interval History: No acute events overnight.     Review of Systems   Constitutional: Positive for appetite change and fatigue.   Gastrointestinal: Positive for abdominal pain, diarrhea and nausea. Negative for blood in stool and vomiting.   Musculoskeletal: Positive for back pain.   Neurological: Positive for weakness and light-headedness.   Psychiatric/Behavioral: Positive for sleep disturbance. Negative for agitation.   All other systems reviewed and are negative.    Objective:     Vital Signs (Most Recent):  Temp: 97.9 °F (36.6 °C) (12/29/20 0743)  Pulse: 73 (12/29/20 0910)  Resp: 16 (12/29/20 0910)  BP: (!) 160/104 (12/29/20 0743)  SpO2: 100 % (12/29/20 0910) Vital Signs (24h Range):  Temp:  [97.6 °F (36.4 °C)-99 °F (37.2 °C)] 97.9 °F (36.6 °C)  Pulse:  [72-84] 73  Resp:  [12-20] 16  SpO2:  [96 %-100 %] 100 %  BP: (116-173)/() 160/104     Weight: 88.5 kg (195 lb 1.7 oz)  Body mass index is 31.49 kg/m².    Intake/Output Summary (Last 24 hours) at 12/29/2020 1045  Last data filed at 12/29/2020 0500  Gross per 24 hour   Intake 3041 ml   Output 1300 ml   Net 1741 ml      Physical Exam  Vitals signs and nursing note reviewed.   Constitutional:       General: She is in acute distress.      Appearance: She is obese. She is ill-appearing.   HENT:      Head: Normocephalic and atraumatic.      Mouth/Throat:      Mouth: Mucous membranes are dry.      Pharynx: Oropharynx is clear.   Eyes:      Extraocular Movements: Extraocular movements intact.      Pupils: Pupils are equal, round, and reactive to light.   Neck:      Musculoskeletal: Neck supple.   Cardiovascular:      Rate and Rhythm: Normal rate and regular rhythm.      Heart sounds: Normal heart sounds. No murmur. No gallop.    Pulmonary:      Effort: Pulmonary effort is normal. No respiratory distress.      Breath sounds: Normal breath sounds. No stridor. No wheezing or rales.   Abdominal:      General: Bowel sounds are normal. There is no distension.       Palpations: Abdomen is soft.      Tenderness: There is abdominal tenderness (suprapubic and LLQ).   Musculoskeletal: Normal range of motion.         General: No swelling.   Skin:     General: Skin is warm and dry.      Coloration: Skin is pale.   Neurological:      General: No focal deficit present.      Mental Status: She is alert. Mental status is at baseline. She is disoriented.      Motor: Weakness present.   Psychiatric:         Mood and Affect: Mood normal.         Significant Labs: All pertinent labs within the past 24 hours have been reviewed.    Significant Imaging: I have reviewed and interpreted all pertinent imaging results/findings within the past 24 hours.

## 2020-12-29 NOTE — ASSESSMENT & PLAN NOTE
Last A1c reviewed- No results found for: LABA1C, HGBA1C  Most recent fingerstick glucose reviewed-   Recent Labs   Lab 12/28/20  1116 12/28/20  1634 12/28/20  1944 12/29/20  0424   POCTGLUCOSE 95 72 76 75     Current correctional scale  Low  Maintain anti-hyperglycemic dose as follows-   Antihyperglycemics (From admission, onward)    Start     Stop Route Frequency Ordered    12/20/20 0238  insulin aspart U-100 pen 0-5 Units      -- SubQ Every 6 hours PRN 12/20/20 0141        Hold Oral hypoglycemics while patient is in the hospital.

## 2020-12-29 NOTE — PT/OT/SLP PROGRESS
Physical Therapy         Treatment        Smiley Harmon   MRN: [id number]        Start time: 900  Stop time: 925      Billable Minutes: Gait training 10 minutes, there ex 15 minutes  Total Minutes: 25 minutes      Treatment Type: Treatment  PT/PTA: PT         General Precautions: Standard, contact, fall; contact for C-diff    Spiritual, Cultural Beliefs, Jain Practices, Values that Affect Care: no    Subjective:  Communicated with nurse prior to session.    Pain/Comfort  Pain Rating 1: 10/10  Location - Side 1: Bilateral  Location - Orientation 1: anterior  Location 1: abdomen  Pain Addressed 1: Reposition  Pain Rating Post-Intervention 1: 10/10pain : pt denies pain    Pt c/o breakfast, abdominal pain but OK to work with PT.    Objective:  Patient found supine in bed.     Functional Mobility:  Bed Mobility:   Supine to sit: Minimal assistance for trunk elevation   Sit to supine: Minimal Assistance for LE elevation   Rolling:SBA   Scooting: Minimal in sitting to edge of bed    Balance:   Static Sit: FAIR+: Able to take MINIMAL challenges from all directions  Dynamic Sit:  FAIR+: Maintains balance through MINIMAL excursions of active trunk motion  Static Stand: FAIR: Maintains without assist but unable to take challenges using RW  Dynamic stand: FAIR: Needs CONTACT GUARD during gait using RW with mod vc's for safety    Transfer Training:  Sit to stand:Minimal Assistance with Rolling Walker vc's for safety/technique  Bed <> Chair:  Stand Pivot and Step Transfer with Minimal Assistance with Rolling Walker with mod vc's for safety/backing up all the way to surface prior to sitting      Gait Training:  Performed gait training in room using RW 20 feet x 2 trials with seated rest break on couch; contact guard     Stair Training:  NA      Additional Treatment:  Performed seated 1x10 hip flexion, abduction, adduction. LAQ and ankle pumps 1x10.    Activity Tolerance: Improved activity from yesterday    Patient left HOB  elevated with all lines intact and call button in reach. Dietitian with patient.    Assessment:  Smiley Harmon is a 80 y.o. female with a medical diagnosis of Septic shock. She presents with decreased strength/mobility but also limited participation to only what pt is willing to do which is minimal.    Rehab potential is fair.    Activity tolerance: Fair    Discharge recommendations:   SNF    Equipment recommendations:       GOALS:   Multidisciplinary Problems     Physical Therapy Goals        Problem: Physical Therapy Goal    Goal Priority Disciplines Outcome Goal Variances Interventions   Physical Therapy Goal     PT, PT/OT Ongoing, Progressing     Description: Goals to be met by: 1/7/2021     Patient will increase functional independence with mobility by performing:     Supine to sit with Contact Guard Assistance   Sit to supine with Contact Guard Assistance   Rolling to Left and Right with Contact Guard Assistance.   Sit to stand transfer with Minimal Assistance   Gait  x 25 feet with Minimal Assistance using Rolling Walker.                    PLAN:    Patient to be seen (up to 12 times per week)  to address the above listed problems via gait training, therapeutic activities, therapeutic exercises  Plan of Care expires: 01/04/21  Plan of Care reviewed with: patient         Braxton Andre, PT 12/29/2020

## 2020-12-29 NOTE — PLAN OF CARE
Pt awake and alert contact precautions maintained.  Pt  aware of plan of care. No complaints at this time

## 2020-12-29 NOTE — ASSESSMENT & PLAN NOTE
Current treatment: s/p course of cipro.      Patient has a leukocytosis.  Last trend as follows-   Lab Results   Component Value Date    WBC 7.94 12/29/2020    WBC 7.21 12/28/2020    WBC 7.22 12/27/2020         Blood Culture, Routine   Date Value Ref Range Status   12/19/2020 No growth after 5 days.  Final     Urine Culture, Routine   Date Value Ref Range Status   12/19/2020 KLEBSIELLA PNEUMONIAE  >100,000 cfu/ml   (A)  Final

## 2020-12-29 NOTE — PLAN OF CARE
Plan of care reviewed with patient.  Patient verbalized understanding and had no further questions.  Patient continues to complain of nausea and mild abdominal pain.  Patient has been able to eat and no emesis noted.  Patient now resting comfortably in bed locked in lowest position, side rails up x3, and call bell in reach.  Will continue to monitor.

## 2020-12-29 NOTE — ASSESSMENT & PLAN NOTE
This is secondary to colitis.  Current treatment:  Oral vancomycin for c diff 7/14.  Overall symptomatic improvement noted.    No off levophed.

## 2020-12-29 NOTE — PROGRESS NOTES
[facility] Medicine  Progress Note    Patient Name: Smiley Harmon  MRN: [id number]  Patient Class: IP- Inpatient   Admission Date: 12/19/2020  Length of Stay: 10 days  Attending Physician: Osmany Allison Jr., MD  Primary Care Provider: Yani Carlos MD        Subjective:     Principal Problem:Septic shock        HPI:  No notes on file    Overview/Hospital Course:  12/21/2020:  Patient states that overall she feels slightly better.  She has experienced an episode of hypoglycemia.  I plan to transition the patient's fluid to D5W.  She has been given an amp of bicarb.  We will continue close ICU monitoring.  She is requiring very minimal levo fed.  Will discontinue once mean arterial pressures remained stable.    12/22/2020:  No acute events overnight.  Patient endorses improvement in abdominal pain.  Now off pressors.  Still having episodes of hypoglycemia.  Continue close ICU monitoring.      12/23/2020: No acute events overnight.  Patient is more anemic this morning and still having some bloody bowel movements.  Will provide 2 units packed red blood cells and continue inpatient monitoring.  Her hypoglycemia appears to be improving.    12/28/2020: No acute events overnight.  Patient still remains weak.  I encouraged her to work with physical and occupational therapy as she is too weak to go home at present.  We discussed the potential option in the future for skilled nursing versus inpatient rehab when she is medically cleared.    12/29/2020: No acute events overnight.  Patient having some mild improvements with her weakness with physical and Occupational Therapy.  The patient is amenable to inpatient rehab after further discussions this morning.  She still have some mild abdominal pain has improved with treatment of her underlying c diff colitis.    Interval History: No acute events overnight.     Review of Systems   Constitutional: Positive for appetite change and fatigue.    Gastrointestinal: Positive for abdominal pain, diarrhea and nausea. Negative for blood in stool and vomiting.   Musculoskeletal: Positive for back pain.   Neurological: Positive for weakness and light-headedness.   Psychiatric/Behavioral: Positive for sleep disturbance. Negative for agitation.   All other systems reviewed and are negative.    Objective:     Vital Signs (Most Recent):  Temp: 97.9 °F (36.6 °C) (12/29/20 0743)  Pulse: 73 (12/29/20 0910)  Resp: 16 (12/29/20 0910)  BP: (!) 160/104 (12/29/20 0743)  SpO2: 100 % (12/29/20 0910) Vital Signs (24h Range):  Temp:  [97.6 °F (36.4 °C)-99 °F (37.2 °C)] 97.9 °F (36.6 °C)  Pulse:  [72-84] 73  Resp:  [12-20] 16  SpO2:  [96 %-100 %] 100 %  BP: (116-173)/() 160/104     Weight: 88.5 kg (195 lb 1.7 oz)  Body mass index is 31.49 kg/m².    Intake/Output Summary (Last 24 hours) at 12/29/2020 1045  Last data filed at 12/29/2020 0500  Gross per 24 hour   Intake 3041 ml   Output 1300 ml   Net 1741 ml      Physical Exam  Vitals signs and nursing note reviewed.   Constitutional:       General: She is in acute distress.      Appearance: She is obese. She is ill-appearing.   HENT:      Head: Normocephalic and atraumatic.      Mouth/Throat:      Mouth: Mucous membranes are dry.      Pharynx: Oropharynx is clear.   Eyes:      Extraocular Movements: Extraocular movements intact.      Pupils: Pupils are equal, round, and reactive to light.   Neck:      Musculoskeletal: Neck supple.   Cardiovascular:      Rate and Rhythm: Normal rate and regular rhythm.      Heart sounds: Normal heart sounds. No murmur. No gallop.    Pulmonary:      Effort: Pulmonary effort is normal. No respiratory distress.      Breath sounds: Normal breath sounds. No stridor. No wheezing or rales.   Abdominal:      General: Bowel sounds are normal. There is no distension.      Palpations: Abdomen is soft.      Tenderness: There is abdominal tenderness (suprapubic and LLQ).   Musculoskeletal: Normal range of  motion.         General: No swelling.   Skin:     General: Skin is warm and dry.      Coloration: Skin is pale.   Neurological:      General: No focal deficit present.      Mental Status: She is alert. Mental status is at baseline. She is disoriented.      Motor: Weakness present.   Psychiatric:         Mood and Affect: Mood normal.         Significant Labs: All pertinent labs within the past 24 hours have been reviewed.    Significant Imaging: I have reviewed and interpreted all pertinent imaging results/findings within the past 24 hours.      Assessment/Plan:      * Septic shock  This is secondary to colitis.  Current treatment:  Oral vancomycin for c diff 7/14.  Overall symptomatic improvement noted.    No off levophed.           Diabetes mellitus, type 2  Last A1c reviewed- No results found for: LABA1C, HGBA1C  Most recent fingerstick glucose reviewed-   Recent Labs   Lab 12/28/20  1116 12/28/20  1634 12/28/20  1944 12/29/20  0424   POCTGLUCOSE 95 72 76 75     Current correctional scale  Low  Maintain anti-hyperglycemic dose as follows-   Antihyperglycemics (From admission, onward)    Start     Stop Route Frequency Ordered    12/20/20 0238  insulin aspart U-100 pen 0-5 Units      -- SubQ Every 6 hours PRN 12/20/20 0141        Hold Oral hypoglycemics while patient is in the hospital.        Hypoglycemia  Resolution noted now that the patient is tolerating by mouth intake.    POCT Glucose   Date Value Ref Range Status   12/29/2020 75 70 - 110 mg/dL Final   12/28/2020 76 70 - 110 mg/dL Final   12/28/2020 72 70 - 110 mg/dL Final   12/28/2020 95 70 - 110 mg/dL Final   12/27/2020 85 70 - 110 mg/dL Final   12/27/2020 108 70 - 110 mg/dL Final   12/26/2020 340 (H) 70 - 110 mg/dL Final            UTI (urinary tract infection)  Current treatment: s/p course of cipro.      Patient has a leukocytosis.  Last trend as follows-   Lab Results   Component Value Date    WBC 7.94 12/29/2020    WBC 7.21 12/28/2020    WBC 7.22  12/27/2020         Blood Culture, Routine   Date Value Ref Range Status   12/19/2020 No growth after 5 days.  Final     Urine Culture, Routine   Date Value Ref Range Status   12/19/2020 KLEBSIELLA PNEUMONIAE  >100,000 cfu/ml   (A)  Final              Acute renal failure  Lab Results   Component Value Date    CREATININE 2.1 (H) 12/29/2020    CREATININE 2.0 (H) 12/28/2020    CREATININE 2.0 (H) 12/27/2020      Will continue gentle IVF.      Nephrology on board.      Colitis  C. Difficile is positive.  Current treatment: flagyl/oral vancomycin.        VTE Risk Mitigation (From admission, onward)         Ordered     IP VTE HIGH RISK PATIENT  Once      12/19/20 0958     Place CAROLINE hose  Until discontinued      12/19/20 0958                Discharge Planning   MARCELLE:      Code Status: Full Code   Is the patient medically ready for discharge?:     Reason for patient still in hospital (select all that apply): Patient unstable  Discharge Plan A: Rehab   Discharge Delays: None known at this time              Osmany Allison Jr, MD  Department of Hospital Medicine   [facility]

## 2020-12-29 NOTE — PHYSICIAN QUERY
PT Name: Smiley Harmon  MR #: [id number]  CHRONIC KIDNEY DISEASE (CKD) CLARIFICATION     CDS: Sonia Evans RN, CCDS         Contact information :ext [phone] [email or web address]     This form is a permanent document in the medical record.     Query Date: December 29, 2020    By submitting this query, we are merely seeking further clarification of documentation. Please utilize your independent clinical judgment when addressing the question(s) below.    The Medical Record contains the following:   Indicators Supporting Clinical Findings Location in Medical Record   x CKD or Chronic Kidney (Renal) Failure/Disease ros on ckd H&P 12/19/20   x BUN/Creatinine                          GFR  12/19 12/25 12/26 12/27 12/28 12/29   BUN 57  18 14 14 13 15   Cr 4.2  2.0 1.9  2.0  2.0  2.1    GFR  9.5  23.4  24.9  23 23.4 22.1      Lab 12/19-12/29/20    Dehydration      Nausea / Vomiting      Dialysis / CRRT      Medication      Treatment     x Other Chronic Conditions medical history of cad, dm2, h/o cva, colitis  H&P 12/19/20    Other       Provider, please further specify the stage of Chronic Kidney Disease (CKD):    [   ] Chronic Kidney Disease (CKD) stage 1 - Normal or high eGFR 90+   [   ] Chronic Kidney Disease (CKD) stage 2 - Mildly decreased eGFR 60-89   [   ] Chronic Kidney Disease (CKD) stage 3a - Mildly to moderately decreased eGFR 45-59   [   ] Chronic Kidney Disease (CKD) stage 3b - Moderately to severely decreased eGFR 30-44   [   ] Chronic Kidney Disease (CKD) stage 3 unspecified   [ x  ] Chronic Kidney Disease (CKD) stage 4 - Severely decreased eGFR 15-29   [   ] Chronic Kidney Disease (CKD) other  Stage,______   [   ] Chronic Kidney Disease (CKD) Ruled out   [   ] Other (please specify): _________________   [   ] Clinically Undetermined     Please document in your progress notes daily for the duration of treatment until resolved and include in your discharge summary.    Reference:     MARILEE Barrera MD,  & ARIANNA Eddy MD, MS. (2020, June 18). Definition and staging of chronic kidney disease in adults ([id number] MARINA Murray MD, ScD & [id number] MATT Powell MD, MSc, Eds.). Retrieved October 21, 2020, from https://www.[employer]/contents/definition-and-staging-of-chronic-kidney-disease-in-adults?search=ckd%20staging&source=search_result&selectedTitle=1~150&usage_type=default&display_rank=1    Form No. [id number]

## 2020-12-30 PROBLEM — A49.8 CLOSTRIDIUM DIFFICILE INFECTION: Status: ACTIVE | Noted: 2020-01-01

## 2020-12-30 PROBLEM — D64.9 ANEMIA: Status: ACTIVE | Noted: 2020-01-01

## 2020-12-30 PROBLEM — R53.81 DEBILITY: Status: ACTIVE | Noted: 2020-01-01

## 2020-12-30 NOTE — PLAN OF CARE
12/30/20 1047   Final Note   Assessment Type Final Discharge Note   Anticipated Discharge Disposition Rehab   Post-Acute Status   Post-Acute Authorization Placement   Post-Acute Placement Status Set-up Complete   Discharge Delays (!) Other  (Awaiting bed availability on IPR.  Bed to be available this PM.)

## 2020-12-30 NOTE — DISCHARGE SUMMARY
[facility] Medicine  Discharge Summary      Patient Name: Smiley Harmon  MRN: [id number]  Patient Class: IP- Inpatient  Admission Date: 12/19/2020  Hospital Length of Stay: 11 days  Discharge Date and Time:  12/30/2020 9:34 AM  Attending Physician: Osmany Allison Jr., MD   Discharging Provider: Osmany Allison Jr, MD  Primary Care Provider: Yani Carlos MD      HPI:   No notes on file    * No surgery found *      Hospital Course:   12/21/2020:  Patient states that overall she feels slightly better.  She has experienced an episode of hypoglycemia.  I plan to transition the patient's fluid to D5W.  She has been given an amp of bicarb.  We will continue close ICU monitoring.  She is requiring very minimal levo fed.  Will discontinue once mean arterial pressures remained stable.    12/22/2020:  No acute events overnight.  Patient endorses improvement in abdominal pain.  Now off pressors.  Still having episodes of hypoglycemia.  Continue close ICU monitoring.      12/23/2020: No acute events overnight.  Patient is more anemic this morning and still having some bloody bowel movements.  Will provide 2 units packed red blood cells and continue inpatient monitoring.  Her hypoglycemia appears to be improving.    12/28/2020: No acute events overnight.  Patient still remains weak.  I encouraged her to work with physical and occupational therapy as she is too weak to go home at present.  We discussed the potential option in the future for skilled nursing versus inpatient rehab when she is medically cleared.    12/29/2020: No acute events overnight.  Patient having some mild improvements with her weakness with physical and Occupational Therapy.  The patient is amenable to inpatient rehab after further discussions this morning.  She still have some mild abdominal pain has improved with treatment of her underlying c diff colitis.    12/30/20/20: Patient has been participating with physical therapy.   She will need to complete a course of therapy for C. Difficile colitis.  We will transition to inpatient rehab at this time.     Consults:   Consults (From admission, onward)        Status Ordering Provider     Inpatient consult to General Surgery  Once     Provider:  Leatha Dominguez MD    Completed MICHI CASIANO     Inpatient consult to Nephrology  Once     Provider:  Macarena Trejo MD    Acknowledged LUCAS SPRING     Inpatient consult to Nephrology  Once     Provider:  Macarena Trejo MD    Acknowledged CYNDY BEY JR     Inpatient consult to Social Work/Case Management  Once     Provider:  (Not yet assigned)    Acknowledged CYNDY BEY JR          * Septic shock  This is secondary to colitis.  Current treatment:  Oral vancomycin for c diff 7/14.  Overall symptomatic improvement noted.    No off levophed.           Diabetes mellitus, type 2  Last A1c reviewed- No results found for: LABA1C, HGBA1C  Most recent fingerstick glucose reviewed-   Recent Labs   Lab 12/29/20  1137 12/29/20  1633 12/29/20  1933 12/30/20  0414   POCTGLUCOSE 100 88 111* 122*     Current correctional scale  Low  Maintain anti-hyperglycemic dose as follows-   Antihyperglycemics (From admission, onward)    Start     Stop Route Frequency Ordered    12/20/20 0238  insulin aspart U-100 pen 0-5 Units      -- SubQ Every 6 hours PRN 12/20/20 0141        Hold Oral hypoglycemics while patient is in the hospital.        Hypoglycemia  Resolution noted now that the patient is tolerating by mouth intake.    POCT Glucose   Date Value Ref Range Status   12/30/2020 122 (H) 70 - 110 mg/dL Final   12/29/2020 111 (H) 70 - 110 mg/dL Final   12/29/2020 88 70 - 110 mg/dL Final   12/29/2020 100 70 - 110 mg/dL Final   12/29/2020 75 70 - 110 mg/dL Final   12/28/2020 76 70 - 110 mg/dL Final   12/28/2020 72 70 - 110 mg/dL Final   12/28/2020 95 70 - 110 mg/dL Final   12/27/2020 85 70 - 110 mg/dL Final            UTI (urinary tract  infection)  Current treatment: s/p course of cipro.      Patient has a leukocytosis.  Last trend as follows-   Lab Results   Component Value Date    WBC 8.35 12/30/2020    WBC 7.94 12/29/2020    WBC 7.21 12/28/2020         Blood Culture, Routine   Date Value Ref Range Status   12/19/2020 No growth after 5 days.  Final     Urine Culture, Routine   Date Value Ref Range Status   12/19/2020 KLEBSIELLA PNEUMONIAE  >100,000 cfu/ml   (A)  Final              Acute renal failure  Lab Results   Component Value Date    CREATININE 2.2 (H) 12/30/2020    CREATININE 2.1 (H) 12/29/2020    CREATININE 2.0 (H) 12/28/2020      Will continue gentle IVF.      Nephrology on board.  This appears to be the patient's new baseline      Colitis  C. Difficile is positive.  Current treatment: oral vancomycin 7/14.        Final Active Diagnoses:    Diagnosis Date Noted POA    PRINCIPAL PROBLEM:  Septic shock [A41.9, R65.21] 12/19/2020 Yes    Hypoglycemia [E16.2] 12/21/2020 Unknown    Diabetes mellitus, type 2 [E11.9] 12/21/2020 Unknown    Colitis [K52.9] 12/19/2020 Yes    Acute renal failure [N17.9] 12/19/2020 Yes    UTI (urinary tract infection) [N39.0] 12/19/2020 Yes      Problems Resolved During this Admission:       Discharged Condition: fair    Disposition:     Follow Up:  Contact information for after-discharge care     Destination     [facility] .    Service: Inpatient Rehabilitation  Contact information:  [address] [place] [id number] [phone]                     Patient Instructions:   No discharge procedures on file.    Significant Diagnostic Studies: Labs: All labs within the past 24 hours have been reviewed    Pending Diagnostic Studies:     None         Medications:  Reconciled Home Medications:      Medication List      ASK your doctor about these medications    carvediloL 3.125 MG tablet  Commonly known as: COREG  Take 3.125 mg by mouth 2 (two) times daily with meals.      clopidogreL 75 mg tablet  Commonly known as: PLAVIX  Take 75 mg by mouth once daily.     gabapentin 100 MG capsule  Commonly known as: NEURONTIN  TAKE 1 CAPSULE BY MOUTH TWICE DAILY     montelukast 10 mg tablet  Commonly known as: SINGULAIR  Take 10 mg by mouth every evening.     pantoprazole 40 MG tablet  Commonly known as: PROTONIX  Take 40 mg by mouth once daily.     paroxetine 20 MG tablet  Commonly known as: PAXIL  Take 20 mg by mouth every morning.     sucralfate 1 gram tablet  Commonly known as: CARAFATE  Take 1 g by mouth 4 (four) times daily.            Indwelling Lines/Drains at time of discharge:   Lines/Drains/Airways     None                 Time spent on the discharge of patient: 60 minutes  Patient was seen and examined on the date of discharge and determined to be suitable for discharge.         Osmany Allison Jr, MD  Department of Hospital Medicine  [facility]

## 2020-12-30 NOTE — ASSESSMENT & PLAN NOTE
Resolution noted now that the patient is tolerating by mouth intake.    POCT Glucose   Date Value Ref Range Status   12/30/2020 122 (H) 70 - 110 mg/dL Final   12/29/2020 111 (H) 70 - 110 mg/dL Final   12/29/2020 88 70 - 110 mg/dL Final   12/29/2020 100 70 - 110 mg/dL Final   12/29/2020 75 70 - 110 mg/dL Final   12/28/2020 76 70 - 110 mg/dL Final   12/28/2020 72 70 - 110 mg/dL Final   12/28/2020 95 70 - 110 mg/dL Final   12/27/2020 85 70 - 110 mg/dL Final

## 2020-12-30 NOTE — ASSESSMENT & PLAN NOTE
Last A1c reviewed- No results found for: LABA1C, HGBA1C  Most recent fingerstick glucose reviewed-   Recent Labs   Lab 12/29/20  1137 12/29/20  1633 12/29/20  1933 12/30/20  0414   POCTGLUCOSE 100 88 111* 122*     Current correctional scale  Low  Maintain anti-hyperglycemic dose as follows-   Antihyperglycemics (From admission, onward)    Start     Stop Route Frequency Ordered    12/20/20 0238  insulin aspart U-100 pen 0-5 Units      -- SubQ Every 6 hours PRN 12/20/20 0141        Hold Oral hypoglycemics while patient is in the hospital.

## 2020-12-30 NOTE — PLAN OF CARE
Patient DC to inpatient rehab. Gave report to Delia. Pt brought to rehab in bed with all belongings.

## 2020-12-30 NOTE — PLAN OF CARE
Informed by Deborah AGUILAR RN w/IPR stating patient has been approved for IPR.  CM notified patient's son Arie whom is in agreement.  CM contacted Dr. Allison to put in dc orders.  Pending orders for dc.

## 2020-12-30 NOTE — ASSESSMENT & PLAN NOTE
Lab Results   Component Value Date    CREATININE 2.2 (H) 12/30/2020    CREATININE 2.1 (H) 12/29/2020    CREATININE 2.0 (H) 12/28/2020      Will continue gentle IVF.      Nephrology on board.  This appears to be the patient's new baseline

## 2020-12-30 NOTE — ASSESSMENT & PLAN NOTE
Current treatment: s/p course of cipro.      Patient has a leukocytosis.  Last trend as follows-   Lab Results   Component Value Date    WBC 8.35 12/30/2020    WBC 7.94 12/29/2020    WBC 7.21 12/28/2020         Blood Culture, Routine   Date Value Ref Range Status   12/19/2020 No growth after 5 days.  Final     Urine Culture, Routine   Date Value Ref Range Status   12/19/2020 KLEBSIELLA PNEUMONIAE  >100,000 cfu/ml   (A)  Final

## 2021-01-01 ENCOUNTER — HOSPITAL ENCOUNTER (EMERGENCY)
Facility: HOSPITAL | Age: 81
Discharge: HOME | End: 2021-04-23
Attending: EMERGENCY MEDICINE
Payer: MEDICARE

## 2021-01-01 ENCOUNTER — HOSPITAL ENCOUNTER (EMERGENCY)
Facility: HOSPITAL | Age: 81
Discharge: NURSING, REHAB OR LONG-TERM CARE | End: 2021-05-13
Attending: EMERGENCY MEDICINE
Payer: MEDICARE

## 2021-01-01 ENCOUNTER — HOSPITAL ENCOUNTER (EMERGENCY)
Facility: HOSPITAL | Age: 81
Discharge: HOME | End: 2021-01-29
Attending: EMERGENCY MEDICINE
Payer: MEDICARE

## 2021-01-01 ENCOUNTER — HOSPITAL ENCOUNTER (INPATIENT)
Facility: HOSPITAL | Age: 81
LOS: 9 days | Discharge: NURSING, REHAB OR LONG-TERM CARE | DRG: 871 | End: 2021-02-18
Attending: EMERGENCY MEDICINE | Admitting: INTERNAL MEDICINE
Payer: MEDICARE

## 2021-01-01 ENCOUNTER — LAB VISIT (OUTPATIENT)
Dept: LAB | Facility: HOSPITAL | Age: 81
End: 2021-01-01
Attending: INTERNAL MEDICINE
Payer: COMMERCIAL

## 2021-01-01 ENCOUNTER — HOSPITAL ENCOUNTER (INPATIENT)
Facility: HOSPITAL | Age: 81
LOS: 9 days | DRG: 193 | End: 2021-12-29
Attending: EMERGENCY MEDICINE | Admitting: EMERGENCY MEDICINE
Payer: MEDICARE

## 2021-01-01 ENCOUNTER — HOSPITAL ENCOUNTER (INPATIENT)
Facility: HOSPITAL | Age: 81
LOS: 4 days | Discharge: NURSING, REHAB OR LONG-TERM CARE | DRG: 690 | End: 2021-06-24
Attending: EMERGENCY MEDICINE | Admitting: INTERNAL MEDICINE
Payer: MEDICARE

## 2021-01-01 VITALS
DIASTOLIC BLOOD PRESSURE: 59 MMHG | RESPIRATION RATE: 20 BRPM | SYSTOLIC BLOOD PRESSURE: 107 MMHG | WEIGHT: 198.88 LBS | HEART RATE: 63 BPM | HEIGHT: 66 IN | OXYGEN SATURATION: 82 % | TEMPERATURE: 97 F | BODY MASS INDEX: 31.96 KG/M2

## 2021-01-01 VITALS
RESPIRATION RATE: 18 BRPM | HEART RATE: 61 BPM | HEIGHT: 66 IN | TEMPERATURE: 98 F | SYSTOLIC BLOOD PRESSURE: 122 MMHG | DIASTOLIC BLOOD PRESSURE: 67 MMHG | BODY MASS INDEX: 28.28 KG/M2 | WEIGHT: 176 LBS | OXYGEN SATURATION: 98 %

## 2021-01-01 VITALS
DIASTOLIC BLOOD PRESSURE: 67 MMHG | RESPIRATION RATE: 16 BRPM | BODY MASS INDEX: 29.19 KG/M2 | WEIGHT: 181.63 LBS | OXYGEN SATURATION: 96 % | TEMPERATURE: 98 F | HEART RATE: 66 BPM | HEIGHT: 66 IN | SYSTOLIC BLOOD PRESSURE: 115 MMHG

## 2021-01-01 VITALS
SYSTOLIC BLOOD PRESSURE: 105 MMHG | BODY MASS INDEX: 30.67 KG/M2 | DIASTOLIC BLOOD PRESSURE: 60 MMHG | OXYGEN SATURATION: 100 % | TEMPERATURE: 98 F | WEIGHT: 190.81 LBS | RESPIRATION RATE: 15 BRPM | HEART RATE: 57 BPM | HEIGHT: 66 IN

## 2021-01-01 VITALS
OXYGEN SATURATION: 100 % | HEART RATE: 59 BPM | SYSTOLIC BLOOD PRESSURE: 111 MMHG | TEMPERATURE: 98 F | RESPIRATION RATE: 18 BRPM | DIASTOLIC BLOOD PRESSURE: 57 MMHG

## 2021-01-01 VITALS
OXYGEN SATURATION: 98 % | HEART RATE: 78 BPM | DIASTOLIC BLOOD PRESSURE: 65 MMHG | TEMPERATURE: 98 F | RESPIRATION RATE: 20 BRPM | HEIGHT: 66 IN | SYSTOLIC BLOOD PRESSURE: 123 MMHG | BODY MASS INDEX: 28.77 KG/M2 | WEIGHT: 179 LBS

## 2021-01-01 VITALS
DIASTOLIC BLOOD PRESSURE: 67 MMHG | BODY MASS INDEX: 30.37 KG/M2 | OXYGEN SATURATION: 97 % | TEMPERATURE: 98 F | HEART RATE: 91 BPM | HEIGHT: 66 IN | SYSTOLIC BLOOD PRESSURE: 168 MMHG | WEIGHT: 189 LBS | RESPIRATION RATE: 18 BRPM

## 2021-01-01 DIAGNOSIS — N39.0 URINARY TRACT INFECTION WITHOUT HEMATURIA, SITE UNSPECIFIED: ICD-10-CM

## 2021-01-01 DIAGNOSIS — J18.9 MULTIFOCAL PNEUMONIA: Primary | ICD-10-CM

## 2021-01-01 DIAGNOSIS — I95.9 HYPOTENSION: ICD-10-CM

## 2021-01-01 DIAGNOSIS — R09.02 HYPOXIA: ICD-10-CM

## 2021-01-01 DIAGNOSIS — E87.5 HYPERKALEMIA: ICD-10-CM

## 2021-01-01 DIAGNOSIS — R41.82 AMS (ALTERED MENTAL STATUS): ICD-10-CM

## 2021-01-01 DIAGNOSIS — K92.2 GASTROINTESTINAL HEMORRHAGE, UNSPECIFIED GASTROINTESTINAL HEMORRHAGE TYPE: ICD-10-CM

## 2021-01-01 DIAGNOSIS — A41.9 SEPSIS: ICD-10-CM

## 2021-01-01 DIAGNOSIS — I31.39 PERICARDIAL EFFUSION: ICD-10-CM

## 2021-01-01 DIAGNOSIS — N17.9 AKI (ACUTE KIDNEY INJURY): ICD-10-CM

## 2021-01-01 DIAGNOSIS — N30.00 ACUTE CYSTITIS WITHOUT HEMATURIA: ICD-10-CM

## 2021-01-01 DIAGNOSIS — R55 SYNCOPE, UNSPECIFIED SYNCOPE TYPE: Primary | ICD-10-CM

## 2021-01-01 DIAGNOSIS — K92.0 HEMATEMESIS: ICD-10-CM

## 2021-01-01 DIAGNOSIS — E86.0 DEHYDRATION: Primary | ICD-10-CM

## 2021-01-01 DIAGNOSIS — D64.9 NORMOCYTIC ANEMIA: ICD-10-CM

## 2021-01-01 DIAGNOSIS — A41.9 SEPSIS, DUE TO UNSPECIFIED ORGANISM, UNSPECIFIED WHETHER ACUTE ORGAN DYSFUNCTION PRESENT: Primary | ICD-10-CM

## 2021-01-01 DIAGNOSIS — N30.00 ACUTE CYSTITIS WITHOUT HEMATURIA: Primary | ICD-10-CM

## 2021-01-01 DIAGNOSIS — R53.1 WEAKNESS: Primary | ICD-10-CM

## 2021-01-01 DIAGNOSIS — N17.9 ACUTE RENAL FAILURE, UNSPECIFIED ACUTE RENAL FAILURE TYPE: ICD-10-CM

## 2021-01-01 DIAGNOSIS — R55 SYNCOPE: ICD-10-CM

## 2021-01-01 DIAGNOSIS — I25.10 CORONARY ATHEROSCLEROSIS OF NATIVE CORONARY ARTERY: ICD-10-CM

## 2021-01-01 DIAGNOSIS — G93.41 METABOLIC ENCEPHALOPATHY: ICD-10-CM

## 2021-01-01 DIAGNOSIS — I10 ESSENTIAL HYPERTENSION, MALIGNANT: Primary | ICD-10-CM

## 2021-01-01 DIAGNOSIS — R53.1 WEAKNESS: ICD-10-CM

## 2021-01-01 LAB
ABO + RH BLD: NORMAL
ALBUMIN SERPL BCP-MCNC: 1.9 G/DL (ref 3.5–5.2)
ALBUMIN SERPL BCP-MCNC: 2 G/DL (ref 3.5–5.2)
ALBUMIN SERPL BCP-MCNC: 2.1 G/DL (ref 3.5–5.2)
ALBUMIN SERPL BCP-MCNC: 2.1 G/DL (ref 3.5–5.2)
ALBUMIN SERPL BCP-MCNC: 2.2 G/DL (ref 3.5–5.2)
ALBUMIN SERPL BCP-MCNC: 2.2 G/DL (ref 3.5–5.2)
ALBUMIN SERPL BCP-MCNC: 2.3 G/DL (ref 3.5–5.2)
ALBUMIN SERPL BCP-MCNC: 2.4 G/DL (ref 3.5–5.2)
ALBUMIN SERPL BCP-MCNC: 2.4 G/DL (ref 3.5–5.2)
ALBUMIN SERPL BCP-MCNC: 2.5 G/DL (ref 3.5–5.2)
ALBUMIN SERPL BCP-MCNC: 2.6 G/DL (ref 3.5–5.2)
ALBUMIN SERPL BCP-MCNC: 2.6 G/DL (ref 3.5–5.2)
ALBUMIN SERPL BCP-MCNC: 2.7 G/DL (ref 3.5–5.2)
ALBUMIN SERPL BCP-MCNC: 2.8 G/DL (ref 3.5–5.2)
ALBUMIN SERPL BCP-MCNC: 2.8 G/DL (ref 3.5–5.2)
ALBUMIN SERPL BCP-MCNC: 2.9 G/DL (ref 3.5–5.2)
ALBUMIN SERPL BCP-MCNC: 3 G/DL (ref 3.5–5.2)
ALBUMIN SERPL BCP-MCNC: 3.1 G/DL (ref 3.5–5.2)
ALBUMIN SERPL BCP-MCNC: 3.2 G/DL (ref 3.5–5.2)
ALBUMIN SERPL BCP-MCNC: 3.3 G/DL (ref 3.5–5.2)
ALBUMIN SERPL BCP-MCNC: 3.4 G/DL (ref 3.5–5.2)
ALBUMIN SERPL BCP-MCNC: 3.5 G/DL (ref 3.5–5.2)
ALP SERPL-CCNC: 100 U/L (ref 55–135)
ALP SERPL-CCNC: 103 U/L (ref 55–135)
ALP SERPL-CCNC: 108 U/L (ref 55–135)
ALP SERPL-CCNC: 110 U/L (ref 55–135)
ALP SERPL-CCNC: 110 U/L (ref 55–135)
ALP SERPL-CCNC: 115 U/L (ref 55–135)
ALP SERPL-CCNC: 122 U/L (ref 55–135)
ALP SERPL-CCNC: 150 U/L (ref 55–135)
ALP SERPL-CCNC: 222 U/L (ref 55–135)
ALP SERPL-CCNC: 61 U/L (ref 55–135)
ALP SERPL-CCNC: 61 U/L (ref 55–135)
ALP SERPL-CCNC: 62 U/L (ref 55–135)
ALP SERPL-CCNC: 62 U/L (ref 55–135)
ALP SERPL-CCNC: 63 U/L (ref 55–135)
ALP SERPL-CCNC: 64 U/L (ref 55–135)
ALP SERPL-CCNC: 65 U/L (ref 55–135)
ALP SERPL-CCNC: 67 U/L (ref 55–135)
ALP SERPL-CCNC: 67 U/L (ref 55–135)
ALP SERPL-CCNC: 69 U/L (ref 55–135)
ALP SERPL-CCNC: 69 U/L (ref 55–135)
ALP SERPL-CCNC: 70 U/L (ref 55–135)
ALP SERPL-CCNC: 70 U/L (ref 55–135)
ALP SERPL-CCNC: 71 U/L (ref 55–135)
ALP SERPL-CCNC: 71 U/L (ref 55–135)
ALP SERPL-CCNC: 73 U/L (ref 55–135)
ALP SERPL-CCNC: 73 U/L (ref 55–135)
ALP SERPL-CCNC: 74 U/L (ref 55–135)
ALP SERPL-CCNC: 74 U/L (ref 55–135)
ALP SERPL-CCNC: 76 U/L (ref 55–135)
ALP SERPL-CCNC: 78 U/L (ref 55–135)
ALP SERPL-CCNC: 81 U/L (ref 55–135)
ALP SERPL-CCNC: 82 U/L (ref 55–135)
ALP SERPL-CCNC: 82 U/L (ref 55–135)
ALP SERPL-CCNC: 85 U/L (ref 55–135)
ALP SERPL-CCNC: 90 U/L (ref 55–135)
ALP SERPL-CCNC: 90 U/L (ref 55–135)
ALP SERPL-CCNC: 97 U/L (ref 55–135)
ALT SERPL W/O P-5'-P-CCNC: 10 U/L (ref 10–44)
ALT SERPL W/O P-5'-P-CCNC: 11 U/L (ref 10–44)
ALT SERPL W/O P-5'-P-CCNC: 12 U/L (ref 10–44)
ALT SERPL W/O P-5'-P-CCNC: 13 U/L (ref 10–44)
ALT SERPL W/O P-5'-P-CCNC: 14 U/L (ref 10–44)
ALT SERPL W/O P-5'-P-CCNC: 14 U/L (ref 10–44)
ALT SERPL W/O P-5'-P-CCNC: 15 U/L (ref 10–44)
ALT SERPL W/O P-5'-P-CCNC: 16 U/L (ref 10–44)
ALT SERPL W/O P-5'-P-CCNC: 17 U/L (ref 10–44)
ALT SERPL W/O P-5'-P-CCNC: 19 U/L (ref 10–44)
ALT SERPL W/O P-5'-P-CCNC: 22 U/L (ref 10–44)
ALT SERPL W/O P-5'-P-CCNC: 35 U/L (ref 10–44)
ALT SERPL W/O P-5'-P-CCNC: 41 U/L (ref 10–44)
ALT SERPL W/O P-5'-P-CCNC: 56 U/L (ref 10–44)
ALT SERPL W/O P-5'-P-CCNC: 8 U/L (ref 10–44)
ALT SERPL W/O P-5'-P-CCNC: 9 U/L (ref 10–44)
ALT SERPL W/O P-5'-P-CCNC: 9 U/L (ref 10–44)
AMPHET+METHAMPHET UR QL: NEGATIVE
ANION GAP SERPL CALC-SCNC: 10 MMOL/L (ref 8–16)
ANION GAP SERPL CALC-SCNC: 11 MMOL/L (ref 8–16)
ANION GAP SERPL CALC-SCNC: 13 MMOL/L (ref 8–16)
ANION GAP SERPL CALC-SCNC: 13 MMOL/L (ref 8–16)
ANION GAP SERPL CALC-SCNC: 14 MMOL/L (ref 8–16)
ANION GAP SERPL CALC-SCNC: 15 MMOL/L (ref 8–16)
ANION GAP SERPL CALC-SCNC: 3 MMOL/L (ref 8–16)
ANION GAP SERPL CALC-SCNC: 3 MMOL/L (ref 8–16)
ANION GAP SERPL CALC-SCNC: 4 MMOL/L (ref 8–16)
ANION GAP SERPL CALC-SCNC: 5 MMOL/L (ref 8–16)
ANION GAP SERPL CALC-SCNC: 6 MMOL/L (ref 8–16)
ANION GAP SERPL CALC-SCNC: 7 MMOL/L (ref 8–16)
ANION GAP SERPL CALC-SCNC: 8 MMOL/L (ref 8–16)
ANION GAP SERPL CALC-SCNC: 9 MMOL/L (ref 8–16)
AST SERPL-CCNC: 11 U/L (ref 10–40)
AST SERPL-CCNC: 13 U/L (ref 10–40)
AST SERPL-CCNC: 13 U/L (ref 10–40)
AST SERPL-CCNC: 14 U/L (ref 10–40)
AST SERPL-CCNC: 15 U/L (ref 10–40)
AST SERPL-CCNC: 16 U/L (ref 10–40)
AST SERPL-CCNC: 17 U/L (ref 10–40)
AST SERPL-CCNC: 18 U/L (ref 10–40)
AST SERPL-CCNC: 19 U/L (ref 10–40)
AST SERPL-CCNC: 20 U/L (ref 10–40)
AST SERPL-CCNC: 21 U/L (ref 10–40)
AST SERPL-CCNC: 21 U/L (ref 10–40)
AST SERPL-CCNC: 22 U/L (ref 10–40)
AST SERPL-CCNC: 25 U/L (ref 10–40)
AST SERPL-CCNC: 28 U/L (ref 10–40)
AST SERPL-CCNC: 29 U/L (ref 10–40)
AST SERPL-CCNC: 31 U/L (ref 10–40)
AST SERPL-CCNC: 32 U/L (ref 10–40)
AST SERPL-CCNC: 33 U/L (ref 10–40)
AST SERPL-CCNC: 34 U/L (ref 10–40)
AST SERPL-CCNC: 34 U/L (ref 10–40)
AST SERPL-CCNC: 41 U/L (ref 10–40)
AST SERPL-CCNC: 43 U/L (ref 10–40)
AST SERPL-CCNC: 44 U/L (ref 10–40)
AST SERPL-CCNC: 48 U/L (ref 10–40)
AST SERPL-CCNC: 50 U/L (ref 10–40)
AST SERPL-CCNC: 57 U/L (ref 10–40)
AST SERPL-CCNC: 60 U/L (ref 10–40)
AST SERPL-CCNC: 87 U/L (ref 10–40)
AST SERPL-CCNC: 89 U/L (ref 10–40)
BACTERIA #/AREA URNS HPF: ABNORMAL /HPF
BACTERIA #/AREA URNS HPF: NEGATIVE /HPF
BACTERIA #/AREA URNS HPF: NEGATIVE /HPF
BACTERIA BLD CULT: NORMAL
BACTERIA UR CULT: ABNORMAL
BARBITURATES UR QL SCN>200 NG/ML: NEGATIVE
BASOPHILS # BLD AUTO: 0.03 K/UL (ref 0–0.2)
BASOPHILS # BLD AUTO: 0.04 K/UL (ref 0–0.2)
BASOPHILS # BLD AUTO: 0.04 K/UL (ref 0–0.2)
BASOPHILS # BLD AUTO: 0.05 K/UL (ref 0–0.2)
BASOPHILS # BLD AUTO: 0.06 K/UL (ref 0–0.2)
BASOPHILS # BLD AUTO: 0.07 K/UL (ref 0–0.2)
BASOPHILS # BLD AUTO: 0.08 K/UL (ref 0–0.2)
BASOPHILS # BLD AUTO: 0.09 K/UL (ref 0–0.2)
BASOPHILS # BLD AUTO: 0.09 K/UL (ref 0–0.2)
BASOPHILS # BLD AUTO: 0.1 K/UL (ref 0–0.2)
BASOPHILS # BLD AUTO: 0.11 K/UL (ref 0–0.2)
BASOPHILS # BLD AUTO: 0.12 K/UL (ref 0–0.2)
BASOPHILS # BLD AUTO: 0.13 K/UL (ref 0–0.2)
BASOPHILS # BLD AUTO: 0.13 K/UL (ref 0–0.2)
BASOPHILS # BLD AUTO: 0.14 K/UL (ref 0–0.2)
BASOPHILS # BLD AUTO: 0.14 K/UL (ref 0–0.2)
BASOPHILS # BLD AUTO: 0.15 K/UL (ref 0–0.2)
BASOPHILS # BLD AUTO: 0.17 K/UL (ref 0–0.2)
BASOPHILS # BLD AUTO: ABNORMAL K/UL (ref 0–0.2)
BASOPHILS NFR BLD: 0 % (ref 0–1.9)
BASOPHILS NFR BLD: 0.4 % (ref 0–1.9)
BASOPHILS NFR BLD: 0.4 % (ref 0–1.9)
BASOPHILS NFR BLD: 0.6 % (ref 0–1.9)
BASOPHILS NFR BLD: 0.7 % (ref 0–1.9)
BASOPHILS NFR BLD: 0.8 % (ref 0–1.9)
BASOPHILS NFR BLD: 0.8 % (ref 0–1.9)
BASOPHILS NFR BLD: 0.9 % (ref 0–1.9)
BASOPHILS NFR BLD: 0.9 % (ref 0–1.9)
BASOPHILS NFR BLD: 1 % (ref 0–1.9)
BASOPHILS NFR BLD: 1.2 % (ref 0–1.9)
BASOPHILS NFR BLD: 1.2 % (ref 0–1.9)
BASOPHILS NFR BLD: 1.3 % (ref 0–1.9)
BASOPHILS NFR BLD: 1.5 % (ref 0–1.9)
BASOPHILS NFR BLD: 1.6 % (ref 0–1.9)
BASOPHILS NFR BLD: 1.7 % (ref 0–1.9)
BASOPHILS NFR BLD: 1.8 % (ref 0–1.9)
BASOPHILS NFR BLD: 1.9 % (ref 0–1.9)
BASOPHILS NFR BLD: 2 % (ref 0–1.9)
BASOPHILS NFR BLD: 2.1 % (ref 0–1.9)
BASOPHILS NFR BLD: 2.1 % (ref 0–1.9)
BASOPHILS NFR BLD: 2.2 % (ref 0–1.9)
BASOPHILS NFR BLD: 2.4 % (ref 0–1.9)
BASOPHILS NFR BLD: 2.5 % (ref 0–1.9)
BASOPHILS NFR BLD: 2.6 % (ref 0–1.9)
BASOPHILS NFR BLD: 2.7 % (ref 0–1.9)
BENZODIAZ UR QL SCN>200 NG/ML: NEGATIVE
BILIRUB SERPL-MCNC: 0.2 MG/DL (ref 0.1–1)
BILIRUB SERPL-MCNC: 0.3 MG/DL (ref 0.1–1)
BILIRUB SERPL-MCNC: 0.4 MG/DL (ref 0.1–1)
BILIRUB SERPL-MCNC: 0.5 MG/DL (ref 0.1–1)
BILIRUB UR QL STRIP: ABNORMAL
BILIRUB UR QL STRIP: NEGATIVE
BLD GP AB SCN CELLS X3 SERPL QL: NORMAL
BLD PROD TYP BPU: NORMAL
BLOOD UNIT EXPIRATION DATE: NORMAL
BLOOD UNIT TYPE CODE: 600
BLOOD UNIT TYPE CODE: 9500
BLOOD UNIT TYPE CODE: 9500
BLOOD UNIT TYPE: NORMAL
BUN SERPL-MCNC: 15 MG/DL (ref 8–23)
BUN SERPL-MCNC: 16 MG/DL (ref 8–23)
BUN SERPL-MCNC: 17 MG/DL (ref 8–23)
BUN SERPL-MCNC: 18 MG/DL (ref 8–23)
BUN SERPL-MCNC: 19 MG/DL (ref 8–23)
BUN SERPL-MCNC: 19 MG/DL (ref 8–23)
BUN SERPL-MCNC: 20 MG/DL (ref 8–23)
BUN SERPL-MCNC: 22 MG/DL (ref 8–23)
BUN SERPL-MCNC: 22 MG/DL (ref 8–23)
BUN SERPL-MCNC: 23 MG/DL (ref 8–23)
BUN SERPL-MCNC: 24 MG/DL (ref 8–23)
BUN SERPL-MCNC: 24 MG/DL (ref 8–23)
BUN SERPL-MCNC: 25 MG/DL (ref 8–23)
BUN SERPL-MCNC: 25 MG/DL (ref 8–23)
BUN SERPL-MCNC: 28 MG/DL (ref 8–23)
BUN SERPL-MCNC: 29 MG/DL (ref 8–23)
BUN SERPL-MCNC: 29 MG/DL (ref 8–23)
BUN SERPL-MCNC: 31 MG/DL (ref 8–23)
BUN SERPL-MCNC: 32 MG/DL (ref 8–23)
BUN SERPL-MCNC: 35 MG/DL (ref 8–23)
BUN SERPL-MCNC: 36 MG/DL (ref 8–23)
BUN SERPL-MCNC: 37 MG/DL (ref 8–23)
BUN SERPL-MCNC: 37 MG/DL (ref 8–23)
BUN SERPL-MCNC: 38 MG/DL (ref 8–23)
BUN SERPL-MCNC: 39 MG/DL (ref 8–23)
BUN SERPL-MCNC: 45 MG/DL (ref 8–23)
BUN SERPL-MCNC: 58 MG/DL (ref 8–23)
BUN SERPL-MCNC: 63 MG/DL (ref 8–23)
BUN SERPL-MCNC: 67 MG/DL (ref 8–23)
BUN SERPL-MCNC: 73 MG/DL (ref 8–23)
BUN SERPL-MCNC: 73 MG/DL (ref 8–23)
BUN SERPL-MCNC: 79 MG/DL (ref 8–23)
BUN SERPL-MCNC: 80 MG/DL (ref 8–23)
BZE UR QL SCN: NEGATIVE
C DIFF GDH STL QL: POSITIVE
C DIFF TOX A+B STL QL IA: NEGATIVE
C DIFF TOX GENS STL QL NAA+PROBE: POSITIVE
CALCIUM SERPL-MCNC: 6.5 MG/DL (ref 8.7–10.5)
CALCIUM SERPL-MCNC: 6.5 MG/DL (ref 8.7–10.5)
CALCIUM SERPL-MCNC: 7 MG/DL (ref 8.7–10.5)
CALCIUM SERPL-MCNC: 7.3 MG/DL (ref 8.7–10.5)
CALCIUM SERPL-MCNC: 7.4 MG/DL (ref 8.7–10.5)
CALCIUM SERPL-MCNC: 7.4 MG/DL (ref 8.7–10.5)
CALCIUM SERPL-MCNC: 7.6 MG/DL (ref 8.7–10.5)
CALCIUM SERPL-MCNC: 7.7 MG/DL (ref 8.7–10.5)
CALCIUM SERPL-MCNC: 7.8 MG/DL (ref 8.7–10.5)
CALCIUM SERPL-MCNC: 7.8 MG/DL (ref 8.7–10.5)
CALCIUM SERPL-MCNC: 7.9 MG/DL (ref 8.7–10.5)
CALCIUM SERPL-MCNC: 8.1 MG/DL (ref 8.7–10.5)
CALCIUM SERPL-MCNC: 8.3 MG/DL (ref 8.7–10.5)
CALCIUM SERPL-MCNC: 8.3 MG/DL (ref 8.7–10.5)
CALCIUM SERPL-MCNC: 8.4 MG/DL (ref 8.7–10.5)
CALCIUM SERPL-MCNC: 8.5 MG/DL (ref 8.7–10.5)
CALCIUM SERPL-MCNC: 8.5 MG/DL (ref 8.7–10.5)
CALCIUM SERPL-MCNC: 8.6 MG/DL (ref 8.7–10.5)
CALCIUM SERPL-MCNC: 8.7 MG/DL (ref 8.7–10.5)
CALCIUM SERPL-MCNC: 8.8 MG/DL (ref 8.7–10.5)
CALCIUM SERPL-MCNC: 8.9 MG/DL (ref 8.7–10.5)
CALCIUM SERPL-MCNC: 8.9 MG/DL (ref 8.7–10.5)
CALCIUM SERPL-MCNC: 9 MG/DL (ref 8.7–10.5)
CALCIUM SERPL-MCNC: 9 MG/DL (ref 8.7–10.5)
CALCIUM SERPL-MCNC: 9.1 MG/DL (ref 8.7–10.5)
CANNABINOIDS UR QL SCN: NEGATIVE
CHLORIDE SERPL-SCNC: 100 MMOL/L (ref 95–110)
CHLORIDE SERPL-SCNC: 100 MMOL/L (ref 95–110)
CHLORIDE SERPL-SCNC: 101 MMOL/L (ref 95–110)
CHLORIDE SERPL-SCNC: 102 MMOL/L (ref 95–110)
CHLORIDE SERPL-SCNC: 103 MMOL/L (ref 95–110)
CHLORIDE SERPL-SCNC: 104 MMOL/L (ref 95–110)
CHLORIDE SERPL-SCNC: 104 MMOL/L (ref 95–110)
CHLORIDE SERPL-SCNC: 105 MMOL/L (ref 95–110)
CHLORIDE SERPL-SCNC: 106 MMOL/L (ref 95–110)
CHLORIDE SERPL-SCNC: 107 MMOL/L (ref 95–110)
CHLORIDE SERPL-SCNC: 107 MMOL/L (ref 95–110)
CHLORIDE SERPL-SCNC: 108 MMOL/L (ref 95–110)
CHLORIDE SERPL-SCNC: 108 MMOL/L (ref 95–110)
CHLORIDE SERPL-SCNC: 109 MMOL/L (ref 95–110)
CHLORIDE SERPL-SCNC: 110 MMOL/L (ref 95–110)
CHLORIDE SERPL-SCNC: 110 MMOL/L (ref 95–110)
CHLORIDE SERPL-SCNC: 111 MMOL/L (ref 95–110)
CHLORIDE SERPL-SCNC: 94 MMOL/L (ref 95–110)
CHLORIDE SERPL-SCNC: 94 MMOL/L (ref 95–110)
CHLORIDE SERPL-SCNC: 96 MMOL/L (ref 95–110)
CHLORIDE SERPL-SCNC: 97 MMOL/L (ref 95–110)
CHLORIDE SERPL-SCNC: 99 MMOL/L (ref 95–110)
CLARITY UR: ABNORMAL
CLARITY UR: CLEAR
CO2 SERPL-SCNC: 16 MMOL/L (ref 23–29)
CO2 SERPL-SCNC: 17 MMOL/L (ref 23–29)
CO2 SERPL-SCNC: 20 MMOL/L (ref 23–29)
CO2 SERPL-SCNC: 21 MMOL/L (ref 23–29)
CO2 SERPL-SCNC: 21 MMOL/L (ref 23–29)
CO2 SERPL-SCNC: 22 MMOL/L (ref 23–29)
CO2 SERPL-SCNC: 22 MMOL/L (ref 23–29)
CO2 SERPL-SCNC: 23 MMOL/L (ref 23–29)
CO2 SERPL-SCNC: 24 MMOL/L (ref 23–29)
CO2 SERPL-SCNC: 25 MMOL/L (ref 23–29)
CO2 SERPL-SCNC: 26 MMOL/L (ref 23–29)
CO2 SERPL-SCNC: 27 MMOL/L (ref 23–29)
CO2 SERPL-SCNC: 27 MMOL/L (ref 23–29)
CO2 SERPL-SCNC: 28 MMOL/L (ref 23–29)
CO2 SERPL-SCNC: 29 MMOL/L (ref 23–29)
CO2 SERPL-SCNC: 29 MMOL/L (ref 23–29)
CO2 SERPL-SCNC: 30 MMOL/L (ref 23–29)
CO2 SERPL-SCNC: 30 MMOL/L (ref 23–29)
CO2 SERPL-SCNC: 31 MMOL/L (ref 23–29)
CO2 SERPL-SCNC: 32 MMOL/L (ref 23–29)
CO2 SERPL-SCNC: 33 MMOL/L (ref 23–29)
CO2 SERPL-SCNC: 34 MMOL/L (ref 23–29)
CO2 SERPL-SCNC: 34 MMOL/L (ref 23–29)
CO2 SERPL-SCNC: 36 MMOL/L (ref 23–29)
CODING SYSTEM: NORMAL
COLOR UR: YELLOW
COMMENTS: NORMAL
CORRECTED TEMPERATURE (PCO2): 30.9 MMHG
CORRECTED TEMPERATURE (PCO2): 67.6 MMHG
CORRECTED TEMPERATURE (PH): 7.17
CORRECTED TEMPERATURE (PH): 7.25
CORRECTED TEMPERATURE (PO2): 101 MMHG
CORRECTED TEMPERATURE (PO2): 27.4 MMHG
CREAT SERPL-MCNC: 2 MG/DL (ref 0.5–1.4)
CREAT SERPL-MCNC: 2.1 MG/DL (ref 0.5–1.4)
CREAT SERPL-MCNC: 2.2 MG/DL (ref 0.5–1.4)
CREAT SERPL-MCNC: 2.3 MG/DL (ref 0.5–1.4)
CREAT SERPL-MCNC: 2.3 MG/DL (ref 0.5–1.4)
CREAT SERPL-MCNC: 2.4 MG/DL (ref 0.5–1.4)
CREAT SERPL-MCNC: 2.4 MG/DL (ref 0.5–1.4)
CREAT SERPL-MCNC: 2.5 MG/DL (ref 0.5–1.4)
CREAT SERPL-MCNC: 2.5 MG/DL (ref 0.5–1.4)
CREAT SERPL-MCNC: 2.6 MG/DL (ref 0.5–1.4)
CREAT SERPL-MCNC: 2.7 MG/DL (ref 0.5–1.4)
CREAT SERPL-MCNC: 2.8 MG/DL (ref 0.5–1.4)
CREAT SERPL-MCNC: 2.9 MG/DL (ref 0.5–1.4)
CREAT SERPL-MCNC: 2.9 MG/DL (ref 0.5–1.4)
CREAT SERPL-MCNC: 3 MG/DL (ref 0.5–1.4)
CREAT SERPL-MCNC: 3.1 MG/DL (ref 0.5–1.4)
CREAT SERPL-MCNC: 3.2 MG/DL (ref 0.5–1.4)
CREAT SERPL-MCNC: 3.2 MG/DL (ref 0.5–1.4)
CREAT SERPL-MCNC: 3.3 MG/DL (ref 0.5–1.4)
CREAT SERPL-MCNC: 3.3 MG/DL (ref 0.5–1.4)
CREAT SERPL-MCNC: 3.4 MG/DL (ref 0.5–1.4)
CREAT SERPL-MCNC: 3.6 MG/DL (ref 0.5–1.4)
CREAT SERPL-MCNC: 3.6 MG/DL (ref 0.5–1.4)
CREAT SERPL-MCNC: 3.8 MG/DL (ref 0.5–1.4)
CREAT SERPL-MCNC: 4 MG/DL (ref 0.5–1.4)
CREAT SERPL-MCNC: 4 MG/DL (ref 0.5–1.4)
CREAT SERPL-MCNC: 4.2 MG/DL (ref 0.5–1.4)
CREAT SERPL-MCNC: 4.6 MG/DL (ref 0.5–1.4)
CREAT SERPL-MCNC: 4.8 MG/DL (ref 0.5–1.4)
CREAT SERPL-MCNC: 5.1 MG/DL (ref 0.5–1.4)
CREAT SERPL-MCNC: 5.4 MG/DL (ref 0.5–1.4)
CREAT SERPL-MCNC: 5.5 MG/DL (ref 0.5–1.4)
CREAT SERPL-MCNC: 5.7 MG/DL (ref 0.5–1.4)
CREAT SERPL-MCNC: 5.8 MG/DL (ref 0.5–1.4)
CREAT UR-MCNC: 103 MG/DL (ref 15–325)
CTP QC/QA: YES
DIFFERENTIAL METHOD: ABNORMAL
DISPENSE STATUS: NORMAL
EOSINOPHIL # BLD AUTO: 0 K/UL (ref 0–0.5)
EOSINOPHIL # BLD AUTO: 0.2 K/UL (ref 0–0.5)
EOSINOPHIL # BLD AUTO: 0.3 K/UL (ref 0–0.5)
EOSINOPHIL # BLD AUTO: 0.4 K/UL (ref 0–0.5)
EOSINOPHIL # BLD AUTO: 0.5 K/UL (ref 0–0.5)
EOSINOPHIL # BLD AUTO: 0.6 K/UL (ref 0–0.5)
EOSINOPHIL # BLD AUTO: 0.7 K/UL (ref 0–0.5)
EOSINOPHIL # BLD AUTO: ABNORMAL K/UL (ref 0–0.5)
EOSINOPHIL NFR BLD: 0 % (ref 0–8)
EOSINOPHIL NFR BLD: 0.1 % (ref 0–8)
EOSINOPHIL NFR BLD: 1 % (ref 0–8)
EOSINOPHIL NFR BLD: 10.2 % (ref 0–8)
EOSINOPHIL NFR BLD: 10.4 % (ref 0–8)
EOSINOPHIL NFR BLD: 11.5 % (ref 0–8)
EOSINOPHIL NFR BLD: 12 % (ref 0–8)
EOSINOPHIL NFR BLD: 2 % (ref 0–8)
EOSINOPHIL NFR BLD: 2 % (ref 0–8)
EOSINOPHIL NFR BLD: 2.1 % (ref 0–8)
EOSINOPHIL NFR BLD: 2.3 % (ref 0–8)
EOSINOPHIL NFR BLD: 3.2 % (ref 0–8)
EOSINOPHIL NFR BLD: 3.2 % (ref 0–8)
EOSINOPHIL NFR BLD: 3.3 % (ref 0–8)
EOSINOPHIL NFR BLD: 3.7 % (ref 0–8)
EOSINOPHIL NFR BLD: 4.1 % (ref 0–8)
EOSINOPHIL NFR BLD: 4.2 % (ref 0–8)
EOSINOPHIL NFR BLD: 4.3 % (ref 0–8)
EOSINOPHIL NFR BLD: 4.3 % (ref 0–8)
EOSINOPHIL NFR BLD: 4.4 % (ref 0–8)
EOSINOPHIL NFR BLD: 4.4 % (ref 0–8)
EOSINOPHIL NFR BLD: 4.5 % (ref 0–8)
EOSINOPHIL NFR BLD: 4.7 % (ref 0–8)
EOSINOPHIL NFR BLD: 4.8 % (ref 0–8)
EOSINOPHIL NFR BLD: 5 % (ref 0–8)
EOSINOPHIL NFR BLD: 5.2 % (ref 0–8)
EOSINOPHIL NFR BLD: 5.2 % (ref 0–8)
EOSINOPHIL NFR BLD: 5.4 % (ref 0–8)
EOSINOPHIL NFR BLD: 5.4 % (ref 0–8)
EOSINOPHIL NFR BLD: 5.5 % (ref 0–8)
EOSINOPHIL NFR BLD: 6 % (ref 0–8)
EOSINOPHIL NFR BLD: 6.6 % (ref 0–8)
EOSINOPHIL NFR BLD: 7.2 % (ref 0–8)
EOSINOPHIL NFR BLD: 7.3 % (ref 0–8)
EOSINOPHIL NFR BLD: 7.3 % (ref 0–8)
EOSINOPHIL NFR BLD: 7.5 % (ref 0–8)
EOSINOPHIL NFR BLD: 8.2 % (ref 0–8)
EOSINOPHIL NFR BLD: 8.6 % (ref 0–8)
EOSINOPHIL NFR BLD: 8.8 % (ref 0–8)
EOSINOPHIL NFR BLD: 9.3 % (ref 0–8)
ERYTHROCYTE [DISTWIDTH] IN BLOOD BY AUTOMATED COUNT: 14.7 % (ref 11.5–14.5)
ERYTHROCYTE [DISTWIDTH] IN BLOOD BY AUTOMATED COUNT: 15.5 % (ref 11.5–14.5)
ERYTHROCYTE [DISTWIDTH] IN BLOOD BY AUTOMATED COUNT: 15.6 % (ref 11.5–14.5)
ERYTHROCYTE [DISTWIDTH] IN BLOOD BY AUTOMATED COUNT: 15.7 % (ref 11.5–14.5)
ERYTHROCYTE [DISTWIDTH] IN BLOOD BY AUTOMATED COUNT: 15.8 % (ref 11.5–14.5)
ERYTHROCYTE [DISTWIDTH] IN BLOOD BY AUTOMATED COUNT: 15.9 % (ref 11.5–14.5)
ERYTHROCYTE [DISTWIDTH] IN BLOOD BY AUTOMATED COUNT: 16 % (ref 11.5–14.5)
ERYTHROCYTE [DISTWIDTH] IN BLOOD BY AUTOMATED COUNT: 16.1 % (ref 11.5–14.5)
ERYTHROCYTE [DISTWIDTH] IN BLOOD BY AUTOMATED COUNT: 16.1 % (ref 11.5–14.5)
ERYTHROCYTE [DISTWIDTH] IN BLOOD BY AUTOMATED COUNT: 16.2 % (ref 11.5–14.5)
ERYTHROCYTE [DISTWIDTH] IN BLOOD BY AUTOMATED COUNT: 16.3 % (ref 11.5–14.5)
ERYTHROCYTE [DISTWIDTH] IN BLOOD BY AUTOMATED COUNT: 16.3 % (ref 11.5–14.5)
ERYTHROCYTE [DISTWIDTH] IN BLOOD BY AUTOMATED COUNT: 16.4 % (ref 11.5–14.5)
ERYTHROCYTE [DISTWIDTH] IN BLOOD BY AUTOMATED COUNT: 16.5 % (ref 11.5–14.5)
ERYTHROCYTE [DISTWIDTH] IN BLOOD BY AUTOMATED COUNT: 16.6 % (ref 11.5–14.5)
ERYTHROCYTE [DISTWIDTH] IN BLOOD BY AUTOMATED COUNT: 16.7 % (ref 11.5–14.5)
ERYTHROCYTE [DISTWIDTH] IN BLOOD BY AUTOMATED COUNT: 16.8 % (ref 11.5–14.5)
ERYTHROCYTE [DISTWIDTH] IN BLOOD BY AUTOMATED COUNT: 16.9 % (ref 11.5–14.5)
ERYTHROCYTE [DISTWIDTH] IN BLOOD BY AUTOMATED COUNT: 17.1 % (ref 11.5–14.5)
ERYTHROCYTE [DISTWIDTH] IN BLOOD BY AUTOMATED COUNT: 17.1 % (ref 11.5–14.5)
ERYTHROCYTE [DISTWIDTH] IN BLOOD BY AUTOMATED COUNT: 17.2 % (ref 11.5–14.5)
ERYTHROCYTE [DISTWIDTH] IN BLOOD BY AUTOMATED COUNT: 17.4 % (ref 11.5–14.5)
EST. GFR  (AFRICAN AMERICAN): 11 ML/MIN/1.73 M^2
EST. GFR  (AFRICAN AMERICAN): 11.6 ML/MIN/1.73 M^2
EST. GFR  (AFRICAN AMERICAN): 11.7 ML/MIN/1.73 M^2
EST. GFR  (AFRICAN AMERICAN): 12.4 ML/MIN/1.73 M^2
EST. GFR  (AFRICAN AMERICAN): 13.2 ML/MIN/1.73 M^2
EST. GFR  (AFRICAN AMERICAN): 13.3 ML/MIN/1.73 M^2
EST. GFR  (AFRICAN AMERICAN): 14.2 ML/MIN/1.73 M^2
EST. GFR  (AFRICAN AMERICAN): 14.6 ML/MIN/1.73 M^2
EST. GFR  (AFRICAN AMERICAN): 14.7 ML/MIN/1.73 M^2
EST. GFR  (AFRICAN AMERICAN): 15.3 ML/MIN/1.73 M^2
EST. GFR  (AFRICAN AMERICAN): 15.3 ML/MIN/1.73 M^2
EST. GFR  (AFRICAN AMERICAN): 15.9 ML/MIN/1.73 M^2
EST. GFR  (AFRICAN AMERICAN): 16.5 ML/MIN/1.73 M^2
EST. GFR  (AFRICAN AMERICAN): 17.1 ML/MIN/1.73 M^2
EST. GFR  (AFRICAN AMERICAN): 17.2 ML/MIN/1.73 M^2
EST. GFR  (AFRICAN AMERICAN): 18 ML/MIN/1.73 M^2
EST. GFR  (AFRICAN AMERICAN): 18.6 ML/MIN/1.73 M^2
EST. GFR  (AFRICAN AMERICAN): 18.8 ML/MIN/1.73 M^2
EST. GFR  (AFRICAN AMERICAN): 19.6 ML/MIN/1.73 M^2
EST. GFR  (AFRICAN AMERICAN): 20.5 ML/MIN/1.73 M^2
EST. GFR  (AFRICAN AMERICAN): 20.6 ML/MIN/1.73 M^2
EST. GFR  (AFRICAN AMERICAN): 21.6 ML/MIN/1.73 M^2
EST. GFR  (AFRICAN AMERICAN): 21.6 ML/MIN/1.73 M^2
EST. GFR  (AFRICAN AMERICAN): 22.8 ML/MIN/1.73 M^2
EST. GFR  (AFRICAN AMERICAN): 22.8 ML/MIN/1.73 M^2
EST. GFR  (AFRICAN AMERICAN): 23.9 ML/MIN/1.73 M^2
EST. GFR  (AFRICAN AMERICAN): 25.2 ML/MIN/1.73 M^2
EST. GFR  (AFRICAN AMERICAN): 26.8 ML/MIN/1.73 M^2
EST. GFR  (AFRICAN AMERICAN): 7.4 ML/MIN/1.73 M^2
EST. GFR  (AFRICAN AMERICAN): 7.6 ML/MIN/1.73 M^2
EST. GFR  (AFRICAN AMERICAN): 7.9 ML/MIN/1.73 M^2
EST. GFR  (AFRICAN AMERICAN): 8.1 ML/MIN/1.73 M^2
EST. GFR  (AFRICAN AMERICAN): 8.7 ML/MIN/1.73 M^2
EST. GFR  (AFRICAN AMERICAN): 9.4 ML/MIN/1.73 M^2
EST. GFR  (AFRICAN AMERICAN): 9.8 ML/MIN/1.73 M^2
EST. GFR  (NON AFRICAN AMERICAN): 10.1 ML/MIN/1.73 M^2
EST. GFR  (NON AFRICAN AMERICAN): 10.1 ML/MIN/1.73 M^2
EST. GFR  (NON AFRICAN AMERICAN): 10.8 ML/MIN/1.73 M^2
EST. GFR  (NON AFRICAN AMERICAN): 11.4 ML/MIN/1.73 M^2
EST. GFR  (NON AFRICAN AMERICAN): 11.5 ML/MIN/1.73 M^2
EST. GFR  (NON AFRICAN AMERICAN): 12.3 ML/MIN/1.73 M^2
EST. GFR  (NON AFRICAN AMERICAN): 12.7 ML/MIN/1.73 M^2
EST. GFR  (NON AFRICAN AMERICAN): 12.8 ML/MIN/1.73 M^2
EST. GFR  (NON AFRICAN AMERICAN): 13.3 ML/MIN/1.73 M^2
EST. GFR  (NON AFRICAN AMERICAN): 13.3 ML/MIN/1.73 M^2
EST. GFR  (NON AFRICAN AMERICAN): 13.8 ML/MIN/1.73 M^2
EST. GFR  (NON AFRICAN AMERICAN): 14.3 ML/MIN/1.73 M^2
EST. GFR  (NON AFRICAN AMERICAN): 14.8 ML/MIN/1.73 M^2
EST. GFR  (NON AFRICAN AMERICAN): 14.9 ML/MIN/1.73 M^2
EST. GFR  (NON AFRICAN AMERICAN): 15.6 ML/MIN/1.73 M^2
EST. GFR  (NON AFRICAN AMERICAN): 16.2 ML/MIN/1.73 M^2
EST. GFR  (NON AFRICAN AMERICAN): 16.3 ML/MIN/1.73 M^2
EST. GFR  (NON AFRICAN AMERICAN): 17 ML/MIN/1.73 M^2
EST. GFR  (NON AFRICAN AMERICAN): 17.7 ML/MIN/1.73 M^2
EST. GFR  (NON AFRICAN AMERICAN): 17.9 ML/MIN/1.73 M^2
EST. GFR  (NON AFRICAN AMERICAN): 18.8 ML/MIN/1.73 M^2
EST. GFR  (NON AFRICAN AMERICAN): 18.8 ML/MIN/1.73 M^2
EST. GFR  (NON AFRICAN AMERICAN): 19.8 ML/MIN/1.73 M^2
EST. GFR  (NON AFRICAN AMERICAN): 19.8 ML/MIN/1.73 M^2
EST. GFR  (NON AFRICAN AMERICAN): 20.7 ML/MIN/1.73 M^2
EST. GFR  (NON AFRICAN AMERICAN): 21.9 ML/MIN/1.73 M^2
EST. GFR  (NON AFRICAN AMERICAN): 23.2 ML/MIN/1.73 M^2
EST. GFR  (NON AFRICAN AMERICAN): 6.5 ML/MIN/1.73 M^2
EST. GFR  (NON AFRICAN AMERICAN): 6.6 ML/MIN/1.73 M^2
EST. GFR  (NON AFRICAN AMERICAN): 6.9 ML/MIN/1.73 M^2
EST. GFR  (NON AFRICAN AMERICAN): 7 ML/MIN/1.73 M^2
EST. GFR  (NON AFRICAN AMERICAN): 7.5 ML/MIN/1.73 M^2
EST. GFR  (NON AFRICAN AMERICAN): 8.1 ML/MIN/1.73 M^2
EST. GFR  (NON AFRICAN AMERICAN): 8.5 ML/MIN/1.73 M^2
EST. GFR  (NON AFRICAN AMERICAN): 9.5 ML/MIN/1.73 M^2
ESTIMATED AVG GLUCOSE: 105 MG/DL (ref 68–131)
ESTIMATED AVG GLUCOSE: 114 MG/DL (ref 68–131)
ETHANOL SERPL-MCNC: <3 MG/DL
FIO2: 28 %
FIO2: 32 %
GLUCOSE SERPL-MCNC: 101 MG/DL (ref 70–110)
GLUCOSE SERPL-MCNC: 102 MG/DL (ref 70–110)
GLUCOSE SERPL-MCNC: 102 MG/DL (ref 70–110)
GLUCOSE SERPL-MCNC: 104 MG/DL (ref 70–110)
GLUCOSE SERPL-MCNC: 105 MG/DL (ref 70–110)
GLUCOSE SERPL-MCNC: 106 MG/DL (ref 70–110)
GLUCOSE SERPL-MCNC: 106 MG/DL (ref 70–110)
GLUCOSE SERPL-MCNC: 108 MG/DL (ref 70–110)
GLUCOSE SERPL-MCNC: 109 MG/DL (ref 70–110)
GLUCOSE SERPL-MCNC: 111 MG/DL (ref 70–110)
GLUCOSE SERPL-MCNC: 112 MG/DL (ref 70–110)
GLUCOSE SERPL-MCNC: 115 MG/DL (ref 70–110)
GLUCOSE SERPL-MCNC: 119 MG/DL (ref 70–110)
GLUCOSE SERPL-MCNC: 121 MG/DL (ref 70–110)
GLUCOSE SERPL-MCNC: 125 MG/DL (ref 70–110)
GLUCOSE SERPL-MCNC: 126 MG/DL (ref 70–110)
GLUCOSE SERPL-MCNC: 173 MG/DL (ref 70–110)
GLUCOSE SERPL-MCNC: 74 MG/DL (ref 70–110)
GLUCOSE SERPL-MCNC: 78 MG/DL (ref 70–110)
GLUCOSE SERPL-MCNC: 78 MG/DL (ref 70–110)
GLUCOSE SERPL-MCNC: 81 MG/DL (ref 70–110)
GLUCOSE SERPL-MCNC: 83 MG/DL (ref 70–110)
GLUCOSE SERPL-MCNC: 84 MG/DL (ref 70–110)
GLUCOSE SERPL-MCNC: 87 MG/DL (ref 70–110)
GLUCOSE SERPL-MCNC: 87 MG/DL (ref 70–110)
GLUCOSE SERPL-MCNC: 88 MG/DL (ref 70–110)
GLUCOSE SERPL-MCNC: 88 MG/DL (ref 70–110)
GLUCOSE SERPL-MCNC: 90 MG/DL (ref 70–110)
GLUCOSE SERPL-MCNC: 91 MG/DL (ref 70–110)
GLUCOSE SERPL-MCNC: 93 MG/DL (ref 70–110)
GLUCOSE SERPL-MCNC: 94 MG/DL (ref 70–110)
GLUCOSE SERPL-MCNC: 94 MG/DL (ref 70–110)
GLUCOSE SERPL-MCNC: 95 MG/DL (ref 70–110)
GLUCOSE SERPL-MCNC: 96 MG/DL (ref 70–110)
GLUCOSE SERPL-MCNC: 97 MG/DL (ref 70–110)
GLUCOSE SERPL-MCNC: 98 MG/DL (ref 70–110)
GLUCOSE SERPL-MCNC: 98 MG/DL (ref 70–110)
GLUCOSE SERPL-MCNC: 99 MG/DL (ref 70–110)
GLUCOSE UR QL STRIP: NEGATIVE
HBA1C MFR BLD: 5.3 % (ref 4–5.6)
HBA1C MFR BLD: 5.6 % (ref 4–5.6)
HCO3 UR-SCNC: 12.3 MMOL/L
HCO3 UR-SCNC: 25.3 MMOL/L
HCT VFR BLD AUTO: 20.8 % (ref 37–48.5)
HCT VFR BLD AUTO: 23 % (ref 37–48.5)
HCT VFR BLD AUTO: 23.9 % (ref 37–48.5)
HCT VFR BLD AUTO: 24.3 % (ref 37–48.5)
HCT VFR BLD AUTO: 24.4 % (ref 37–48.5)
HCT VFR BLD AUTO: 24.8 % (ref 37–48.5)
HCT VFR BLD AUTO: 24.9 % (ref 37–48.5)
HCT VFR BLD AUTO: 24.9 % (ref 37–48.5)
HCT VFR BLD AUTO: 25 % (ref 37–48.5)
HCT VFR BLD AUTO: 25.6 % (ref 37–48.5)
HCT VFR BLD AUTO: 25.8 % (ref 37–48.5)
HCT VFR BLD AUTO: 26.1 % (ref 37–48.5)
HCT VFR BLD AUTO: 26.3 % (ref 37–48.5)
HCT VFR BLD AUTO: 26.6 % (ref 37–48.5)
HCT VFR BLD AUTO: 26.6 % (ref 37–48.5)
HCT VFR BLD AUTO: 26.8 % (ref 37–48.5)
HCT VFR BLD AUTO: 26.9 % (ref 37–48.5)
HCT VFR BLD AUTO: 27.4 % (ref 37–48.5)
HCT VFR BLD AUTO: 28.5 % (ref 37–48.5)
HCT VFR BLD AUTO: 29.2 % (ref 37–48.5)
HCT VFR BLD AUTO: 30.5 % (ref 37–48.5)
HCT VFR BLD AUTO: 30.8 % (ref 37–48.5)
HCT VFR BLD AUTO: 31 % (ref 37–48.5)
HCT VFR BLD AUTO: 31.7 % (ref 37–48.5)
HCT VFR BLD AUTO: 33.7 % (ref 37–48.5)
HCT VFR BLD AUTO: 33.8 % (ref 37–48.5)
HCT VFR BLD AUTO: 33.9 % (ref 37–48.5)
HCT VFR BLD AUTO: 34 % (ref 37–48.5)
HCT VFR BLD AUTO: 34.2 % (ref 37–48.5)
HCT VFR BLD AUTO: 34.8 % (ref 37–48.5)
HCT VFR BLD AUTO: 34.9 % (ref 37–48.5)
HCT VFR BLD AUTO: 35.2 % (ref 37–48.5)
HCT VFR BLD AUTO: 35.3 % (ref 37–48.5)
HCT VFR BLD AUTO: 35.5 % (ref 37–48.5)
HCT VFR BLD AUTO: 35.6 % (ref 37–48.5)
HCT VFR BLD AUTO: 35.7 % (ref 37–48.5)
HCT VFR BLD AUTO: 36 % (ref 37–48.5)
HCT VFR BLD AUTO: 37.4 % (ref 37–48.5)
HCT VFR BLD AUTO: 39.1 % (ref 37–48.5)
HCT VFR BLD AUTO: 39.7 % (ref 37–48.5)
HCT VFR BLD AUTO: 39.7 % (ref 37–48.5)
HCT VFR BLD AUTO: 40 % (ref 37–48.5)
HCT VFR BLD AUTO: 41.6 % (ref 37–48.5)
HGB BLD-MCNC: 10.1 G/DL (ref 12–16)
HGB BLD-MCNC: 10.2 G/DL (ref 12–16)
HGB BLD-MCNC: 10.3 G/DL (ref 12–16)
HGB BLD-MCNC: 10.5 G/DL (ref 12–16)
HGB BLD-MCNC: 10.7 G/DL (ref 12–16)
HGB BLD-MCNC: 10.8 G/DL (ref 12–16)
HGB BLD-MCNC: 10.9 G/DL (ref 12–16)
HGB BLD-MCNC: 11 G/DL (ref 12–16)
HGB BLD-MCNC: 11.1 G/DL (ref 12–16)
HGB BLD-MCNC: 11.6 G/DL (ref 12–16)
HGB BLD-MCNC: 11.7 G/DL (ref 12–16)
HGB BLD-MCNC: 11.8 G/DL (ref 12–16)
HGB BLD-MCNC: 12.1 G/DL (ref 12–16)
HGB BLD-MCNC: 12.4 G/DL (ref 12–16)
HGB BLD-MCNC: 12.9 G/DL (ref 12–16)
HGB BLD-MCNC: 12.9 G/DL (ref 12–16)
HGB BLD-MCNC: 6.9 G/DL (ref 12–16)
HGB BLD-MCNC: 7 G/DL (ref 12–16)
HGB BLD-MCNC: 7.2 G/DL (ref 12–16)
HGB BLD-MCNC: 7.3 G/DL (ref 12–16)
HGB BLD-MCNC: 7.3 G/DL (ref 12–16)
HGB BLD-MCNC: 7.5 G/DL (ref 12–16)
HGB BLD-MCNC: 7.5 G/DL (ref 12–16)
HGB BLD-MCNC: 7.6 G/DL (ref 12–16)
HGB BLD-MCNC: 7.7 G/DL (ref 12–16)
HGB BLD-MCNC: 7.9 G/DL (ref 12–16)
HGB BLD-MCNC: 7.9 G/DL (ref 12–16)
HGB BLD-MCNC: 8.2 G/DL (ref 12–16)
HGB BLD-MCNC: 8.4 G/DL (ref 12–16)
HGB BLD-MCNC: 8.5 G/DL (ref 12–16)
HGB BLD-MCNC: 8.6 G/DL (ref 12–16)
HGB BLD-MCNC: 8.8 G/DL (ref 12–16)
HGB BLD-MCNC: 8.8 G/DL (ref 12–16)
HGB BLD-MCNC: 8.9 G/DL (ref 12–16)
HGB BLD-MCNC: 9.2 G/DL (ref 12–16)
HGB BLD-MCNC: 9.5 G/DL (ref 12–16)
HGB BLD-MCNC: 9.7 G/DL (ref 12–16)
HGB BLD-MCNC: 9.9 G/DL (ref 12–16)
HGB UR QL STRIP: ABNORMAL
HGB UR QL STRIP: NEGATIVE
HYALINE CASTS #/AREA URNS LPF: 0 /LPF
HYALINE CASTS #/AREA URNS LPF: 0 /LPF
HYALINE CASTS #/AREA URNS LPF: 1 /LPF
HYALINE CASTS #/AREA URNS LPF: 3 /LPF
HYALINE CASTS #/AREA URNS LPF: 3 /LPF
IMM GRANULOCYTES # BLD AUTO: 0 K/UL (ref 0–0.04)
IMM GRANULOCYTES # BLD AUTO: 0.01 K/UL (ref 0–0.04)
IMM GRANULOCYTES # BLD AUTO: 0.02 K/UL (ref 0–0.04)
IMM GRANULOCYTES # BLD AUTO: 0.03 K/UL (ref 0–0.04)
IMM GRANULOCYTES # BLD AUTO: 0.03 K/UL (ref 0–0.04)
IMM GRANULOCYTES # BLD AUTO: 0.04 K/UL (ref 0–0.04)
IMM GRANULOCYTES # BLD AUTO: 0.05 K/UL (ref 0–0.04)
IMM GRANULOCYTES # BLD AUTO: 0.06 K/UL (ref 0–0.04)
IMM GRANULOCYTES # BLD AUTO: 0.1 K/UL (ref 0–0.04)
IMM GRANULOCYTES # BLD AUTO: 0.17 K/UL (ref 0–0.04)
IMM GRANULOCYTES # BLD AUTO: 0.2 K/UL (ref 0–0.04)
IMM GRANULOCYTES # BLD AUTO: 0.24 K/UL (ref 0–0.04)
IMM GRANULOCYTES # BLD AUTO: 0.35 K/UL (ref 0–0.04)
IMM GRANULOCYTES # BLD AUTO: 0.52 K/UL (ref 0–0.04)
IMM GRANULOCYTES # BLD AUTO: ABNORMAL K/UL (ref 0–0.04)
IMM GRANULOCYTES NFR BLD AUTO: 0 % (ref 0–0.5)
IMM GRANULOCYTES NFR BLD AUTO: 0.2 % (ref 0–0.5)
IMM GRANULOCYTES NFR BLD AUTO: 0.3 % (ref 0–0.5)
IMM GRANULOCYTES NFR BLD AUTO: 0.4 % (ref 0–0.5)
IMM GRANULOCYTES NFR BLD AUTO: 0.5 % (ref 0–0.5)
IMM GRANULOCYTES NFR BLD AUTO: 0.6 % (ref 0–0.5)
IMM GRANULOCYTES NFR BLD AUTO: 0.8 % (ref 0–0.5)
IMM GRANULOCYTES NFR BLD AUTO: 1.2 % (ref 0–0.5)
IMM GRANULOCYTES NFR BLD AUTO: 1.4 % (ref 0–0.5)
IMM GRANULOCYTES NFR BLD AUTO: 2.8 % (ref 0–0.5)
IMM GRANULOCYTES NFR BLD AUTO: 4.1 % (ref 0–0.5)
IMM GRANULOCYTES NFR BLD AUTO: 4.2 % (ref 0–0.5)
IMM GRANULOCYTES NFR BLD AUTO: 5.8 % (ref 0–0.5)
IMM GRANULOCYTES NFR BLD AUTO: 6.2 % (ref 0–0.5)
IMM GRANULOCYTES NFR BLD AUTO: ABNORMAL % (ref 0–0.5)
INR PPP: 0.9 (ref 0.8–1.2)
IRON SATN MFR SERPL: 14 % (ref 20–50)
IRON SERPL-MCNC: 48 UG/DL (ref 30–160)
KETONES UR QL STRIP: NEGATIVE
LACTATE SERPL-SCNC: 1.2 MMOL/L (ref 0.5–2.2)
LACTATE SERPL-SCNC: 1.3 MMOL/L (ref 0.5–2.2)
LACTATE SERPL-SCNC: 1.5 MMOL/L (ref 0.5–2.2)
LACTATE SERPL-SCNC: 1.8 MMOL/L (ref 0.5–2.2)
LACTATE SERPL-SCNC: 1.8 MMOL/L (ref 0.5–2.2)
LACTATE SERPL-SCNC: 2.4 MMOL/L (ref 0.5–2.2)
LEUKOCYTE ESTERASE UR QL STRIP: ABNORMAL
LEUKOCYTE ESTERASE UR QL STRIP: NEGATIVE
LIPASE SERPL-CCNC: 20 U/L (ref 23–300)
LIPASE SERPL-CCNC: 93 U/L (ref 23–300)
LPM: 2
LPM: 3
LYMPHOCYTES # BLD AUTO: 0.6 K/UL (ref 1–4.8)
LYMPHOCYTES # BLD AUTO: 0.6 K/UL (ref 1–4.8)
LYMPHOCYTES # BLD AUTO: 0.7 K/UL (ref 1–4.8)
LYMPHOCYTES # BLD AUTO: 0.8 K/UL (ref 1–4.8)
LYMPHOCYTES # BLD AUTO: 0.8 K/UL (ref 1–4.8)
LYMPHOCYTES # BLD AUTO: 0.9 K/UL (ref 1–4.8)
LYMPHOCYTES # BLD AUTO: 1.1 K/UL (ref 1–4.8)
LYMPHOCYTES # BLD AUTO: 1.3 K/UL (ref 1–4.8)
LYMPHOCYTES # BLD AUTO: 1.5 K/UL (ref 1–4.8)
LYMPHOCYTES # BLD AUTO: 1.5 K/UL (ref 1–4.8)
LYMPHOCYTES # BLD AUTO: 1.6 K/UL (ref 1–4.8)
LYMPHOCYTES # BLD AUTO: 1.7 K/UL (ref 1–4.8)
LYMPHOCYTES # BLD AUTO: 1.8 K/UL (ref 1–4.8)
LYMPHOCYTES # BLD AUTO: 1.9 K/UL (ref 1–4.8)
LYMPHOCYTES # BLD AUTO: 2 K/UL (ref 1–4.8)
LYMPHOCYTES # BLD AUTO: 2 K/UL (ref 1–4.8)
LYMPHOCYTES # BLD AUTO: 2.1 K/UL (ref 1–4.8)
LYMPHOCYTES # BLD AUTO: 2.2 K/UL (ref 1–4.8)
LYMPHOCYTES # BLD AUTO: ABNORMAL K/UL (ref 1–4.8)
LYMPHOCYTES NFR BLD: 11.6 % (ref 18–48)
LYMPHOCYTES NFR BLD: 12.2 % (ref 18–48)
LYMPHOCYTES NFR BLD: 13 % (ref 18–48)
LYMPHOCYTES NFR BLD: 13.2 % (ref 18–48)
LYMPHOCYTES NFR BLD: 13.5 % (ref 18–48)
LYMPHOCYTES NFR BLD: 14 % (ref 18–48)
LYMPHOCYTES NFR BLD: 14 % (ref 18–48)
LYMPHOCYTES NFR BLD: 14.4 % (ref 18–48)
LYMPHOCYTES NFR BLD: 15.7 % (ref 18–48)
LYMPHOCYTES NFR BLD: 16 % (ref 18–48)
LYMPHOCYTES NFR BLD: 16.3 % (ref 18–48)
LYMPHOCYTES NFR BLD: 18 % (ref 18–48)
LYMPHOCYTES NFR BLD: 18.7 % (ref 18–48)
LYMPHOCYTES NFR BLD: 20.6 % (ref 18–48)
LYMPHOCYTES NFR BLD: 21 % (ref 18–48)
LYMPHOCYTES NFR BLD: 21.1 % (ref 18–48)
LYMPHOCYTES NFR BLD: 21.5 % (ref 18–48)
LYMPHOCYTES NFR BLD: 21.7 % (ref 18–48)
LYMPHOCYTES NFR BLD: 22.4 % (ref 18–48)
LYMPHOCYTES NFR BLD: 23.1 % (ref 18–48)
LYMPHOCYTES NFR BLD: 24 % (ref 18–48)
LYMPHOCYTES NFR BLD: 24 % (ref 18–48)
LYMPHOCYTES NFR BLD: 24.5 % (ref 18–48)
LYMPHOCYTES NFR BLD: 25 % (ref 18–48)
LYMPHOCYTES NFR BLD: 28 % (ref 18–48)
LYMPHOCYTES NFR BLD: 28.2 % (ref 18–48)
LYMPHOCYTES NFR BLD: 28.2 % (ref 18–48)
LYMPHOCYTES NFR BLD: 29.2 % (ref 18–48)
LYMPHOCYTES NFR BLD: 29.8 % (ref 18–48)
LYMPHOCYTES NFR BLD: 29.9 % (ref 18–48)
LYMPHOCYTES NFR BLD: 30.5 % (ref 18–48)
LYMPHOCYTES NFR BLD: 31 % (ref 18–48)
LYMPHOCYTES NFR BLD: 31.9 % (ref 18–48)
LYMPHOCYTES NFR BLD: 32.1 % (ref 18–48)
LYMPHOCYTES NFR BLD: 33.5 % (ref 18–48)
LYMPHOCYTES NFR BLD: 33.8 % (ref 18–48)
LYMPHOCYTES NFR BLD: 35 % (ref 18–48)
LYMPHOCYTES NFR BLD: 37.2 % (ref 18–48)
LYMPHOCYTES NFR BLD: 37.4 % (ref 18–48)
LYMPHOCYTES NFR BLD: 6.4 % (ref 18–48)
LYMPHOCYTES NFR BLD: 7.2 % (ref 18–48)
LYMPHOCYTES NFR BLD: 7.8 % (ref 18–48)
Lab: ABNORMAL
MAGNESIUM SERPL-MCNC: 1.4 MG/DL (ref 1.6–2.6)
MAGNESIUM SERPL-MCNC: 1.5 MG/DL (ref 1.6–2.6)
MAGNESIUM SERPL-MCNC: 1.6 MG/DL (ref 1.6–2.6)
MAGNESIUM SERPL-MCNC: 1.7 MG/DL (ref 1.6–2.6)
MAGNESIUM SERPL-MCNC: 1.8 MG/DL (ref 1.6–2.6)
MAGNESIUM SERPL-MCNC: 1.9 MG/DL (ref 1.6–2.6)
MAGNESIUM SERPL-MCNC: 2 MG/DL (ref 1.6–2.6)
MAGNESIUM SERPL-MCNC: 2.2 MG/DL (ref 1.6–2.6)
MAGNESIUM SERPL-MCNC: 2.4 MG/DL (ref 1.6–2.6)
MAGNESIUM SERPL-MCNC: 2.8 MG/DL (ref 1.6–2.6)
MAGNESIUM SERPL-MCNC: 3.1 MG/DL (ref 1.6–2.6)
MCH RBC QN AUTO: 26.4 PG (ref 27–31)
MCH RBC QN AUTO: 26.8 PG (ref 27–31)
MCH RBC QN AUTO: 26.8 PG (ref 27–31)
MCH RBC QN AUTO: 26.9 PG (ref 27–31)
MCH RBC QN AUTO: 26.9 PG (ref 27–31)
MCH RBC QN AUTO: 27.1 PG (ref 27–31)
MCH RBC QN AUTO: 27.2 PG (ref 27–31)
MCH RBC QN AUTO: 27.3 PG (ref 27–31)
MCH RBC QN AUTO: 27.9 PG (ref 27–31)
MCH RBC QN AUTO: 28.2 PG (ref 27–31)
MCH RBC QN AUTO: 28.3 PG (ref 27–31)
MCH RBC QN AUTO: 28.5 PG (ref 27–31)
MCH RBC QN AUTO: 28.6 PG (ref 27–31)
MCH RBC QN AUTO: 28.7 PG (ref 27–31)
MCH RBC QN AUTO: 28.8 PG (ref 27–31)
MCH RBC QN AUTO: 28.9 PG (ref 27–31)
MCH RBC QN AUTO: 29 PG (ref 27–31)
MCH RBC QN AUTO: 29.1 PG (ref 27–31)
MCH RBC QN AUTO: 29.2 PG (ref 27–31)
MCH RBC QN AUTO: 29.3 PG (ref 27–31)
MCH RBC QN AUTO: 29.4 PG (ref 27–31)
MCH RBC QN AUTO: 29.5 PG (ref 27–31)
MCH RBC QN AUTO: 29.6 PG (ref 27–31)
MCH RBC QN AUTO: 29.7 PG (ref 27–31)
MCH RBC QN AUTO: 29.8 PG (ref 27–31)
MCH RBC QN AUTO: 29.9 PG (ref 27–31)
MCHC RBC AUTO-ENTMCNC: 28.8 G/DL (ref 32–36)
MCHC RBC AUTO-ENTMCNC: 29.3 G/DL (ref 32–36)
MCHC RBC AUTO-ENTMCNC: 30.2 G/DL (ref 32–36)
MCHC RBC AUTO-ENTMCNC: 30.2 G/DL (ref 32–36)
MCHC RBC AUTO-ENTMCNC: 30.5 G/DL (ref 32–36)
MCHC RBC AUTO-ENTMCNC: 30.6 G/DL (ref 32–36)
MCHC RBC AUTO-ENTMCNC: 30.6 G/DL (ref 32–36)
MCHC RBC AUTO-ENTMCNC: 30.7 G/DL (ref 32–36)
MCHC RBC AUTO-ENTMCNC: 30.8 G/DL (ref 32–36)
MCHC RBC AUTO-ENTMCNC: 30.8 G/DL (ref 32–36)
MCHC RBC AUTO-ENTMCNC: 30.9 G/DL (ref 32–36)
MCHC RBC AUTO-ENTMCNC: 31 G/DL (ref 32–36)
MCHC RBC AUTO-ENTMCNC: 31 G/DL (ref 32–36)
MCHC RBC AUTO-ENTMCNC: 31.2 G/DL (ref 32–36)
MCHC RBC AUTO-ENTMCNC: 31.3 G/DL (ref 32–36)
MCHC RBC AUTO-ENTMCNC: 31.5 G/DL (ref 32–36)
MCHC RBC AUTO-ENTMCNC: 31.5 G/DL (ref 32–36)
MCHC RBC AUTO-ENTMCNC: 31.6 G/DL (ref 32–36)
MCHC RBC AUTO-ENTMCNC: 31.7 G/DL (ref 32–36)
MCHC RBC AUTO-ENTMCNC: 31.9 G/DL (ref 32–36)
MCHC RBC AUTO-ENTMCNC: 32 G/DL (ref 32–36)
MCHC RBC AUTO-ENTMCNC: 32.1 G/DL (ref 32–36)
MCHC RBC AUTO-ENTMCNC: 32.2 G/DL (ref 32–36)
MCHC RBC AUTO-ENTMCNC: 32.3 G/DL (ref 32–36)
MCHC RBC AUTO-ENTMCNC: 32.5 G/DL (ref 32–36)
MCHC RBC AUTO-ENTMCNC: 32.6 G/DL (ref 32–36)
MCHC RBC AUTO-ENTMCNC: 32.6 G/DL (ref 32–36)
MCHC RBC AUTO-ENTMCNC: 32.8 G/DL (ref 32–36)
MCHC RBC AUTO-ENTMCNC: 33.1 G/DL (ref 32–36)
MCHC RBC AUTO-ENTMCNC: 33.2 G/DL (ref 32–36)
MCV RBC AUTO: 86 FL (ref 82–98)
MCV RBC AUTO: 87 FL (ref 82–98)
MCV RBC AUTO: 88 FL (ref 82–98)
MCV RBC AUTO: 89 FL (ref 82–98)
MCV RBC AUTO: 90 FL (ref 82–98)
MCV RBC AUTO: 91 FL (ref 82–98)
MCV RBC AUTO: 91 FL (ref 82–98)
MCV RBC AUTO: 92 FL (ref 82–98)
MCV RBC AUTO: 93 FL (ref 82–98)
MCV RBC AUTO: 94 FL (ref 82–98)
MCV RBC AUTO: 94 FL (ref 82–98)
MCV RBC AUTO: 95 FL (ref 82–98)
METAMYELOCYTES NFR BLD MANUAL: 1 %
METAMYELOCYTES NFR BLD MANUAL: 2 %
METAMYELOCYTES NFR BLD MANUAL: 3 %
METHADONE UR QL SCN>300 NG/ML: NEGATIVE
MICROSCOPIC COMMENT: ABNORMAL
MICROSCOPIC COMMENT: NORMAL
MODIFIED ALLEN'S TEST: ABNORMAL
MODIFIED ALLEN'S TEST: NORMAL
MONOCYTES # BLD AUTO: 0.4 K/UL (ref 0.3–1)
MONOCYTES # BLD AUTO: 0.5 K/UL (ref 0.3–1)
MONOCYTES # BLD AUTO: 0.6 K/UL (ref 0.3–1)
MONOCYTES # BLD AUTO: 0.7 K/UL (ref 0.3–1)
MONOCYTES # BLD AUTO: 0.8 K/UL (ref 0.3–1)
MONOCYTES # BLD AUTO: 0.9 K/UL (ref 0.3–1)
MONOCYTES # BLD AUTO: 1 K/UL (ref 0.3–1)
MONOCYTES # BLD AUTO: 1 K/UL (ref 0.3–1)
MONOCYTES # BLD AUTO: 1.1 K/UL (ref 0.3–1)
MONOCYTES # BLD AUTO: 1.1 K/UL (ref 0.3–1)
MONOCYTES # BLD AUTO: 1.4 K/UL (ref 0.3–1)
MONOCYTES # BLD AUTO: ABNORMAL K/UL (ref 0.3–1)
MONOCYTES NFR BLD: 1 % (ref 4–15)
MONOCYTES NFR BLD: 10 % (ref 4–15)
MONOCYTES NFR BLD: 10 % (ref 4–15)
MONOCYTES NFR BLD: 10.1 % (ref 4–15)
MONOCYTES NFR BLD: 10.2 % (ref 4–15)
MONOCYTES NFR BLD: 10.4 % (ref 4–15)
MONOCYTES NFR BLD: 10.6 % (ref 4–15)
MONOCYTES NFR BLD: 10.8 % (ref 4–15)
MONOCYTES NFR BLD: 11.3 % (ref 4–15)
MONOCYTES NFR BLD: 11.3 % (ref 4–15)
MONOCYTES NFR BLD: 11.4 % (ref 4–15)
MONOCYTES NFR BLD: 11.7 % (ref 4–15)
MONOCYTES NFR BLD: 12 % (ref 4–15)
MONOCYTES NFR BLD: 12.3 % (ref 4–15)
MONOCYTES NFR BLD: 13.2 % (ref 4–15)
MONOCYTES NFR BLD: 14.5 % (ref 4–15)
MONOCYTES NFR BLD: 14.6 % (ref 4–15)
MONOCYTES NFR BLD: 15.6 % (ref 4–15)
MONOCYTES NFR BLD: 5 % (ref 4–15)
MONOCYTES NFR BLD: 6 % (ref 4–15)
MONOCYTES NFR BLD: 6.2 % (ref 4–15)
MONOCYTES NFR BLD: 7 % (ref 4–15)
MONOCYTES NFR BLD: 7.4 % (ref 4–15)
MONOCYTES NFR BLD: 7.6 % (ref 4–15)
MONOCYTES NFR BLD: 7.8 % (ref 4–15)
MONOCYTES NFR BLD: 8 % (ref 4–15)
MONOCYTES NFR BLD: 8.3 % (ref 4–15)
MONOCYTES NFR BLD: 9 % (ref 4–15)
MONOCYTES NFR BLD: 9 % (ref 4–15)
MONOCYTES NFR BLD: 9.1 % (ref 4–15)
MONOCYTES NFR BLD: 9.2 % (ref 4–15)
MONOCYTES NFR BLD: 9.2 % (ref 4–15)
MONOCYTES NFR BLD: 9.4 % (ref 4–15)
MONOCYTES NFR BLD: 9.5 % (ref 4–15)
MONOCYTES NFR BLD: 9.8 % (ref 4–15)
MONOCYTES NFR BLD: 9.8 % (ref 4–15)
MONOCYTES NFR BLD: 9.9 % (ref 4–15)
MYELOCYTES NFR BLD MANUAL: 1 %
NEUTROPHILS # BLD AUTO: 10.4 K/UL (ref 1.8–7.7)
NEUTROPHILS # BLD AUTO: 2.2 K/UL (ref 1.8–7.7)
NEUTROPHILS # BLD AUTO: 2.3 K/UL (ref 1.8–7.7)
NEUTROPHILS # BLD AUTO: 2.4 K/UL (ref 1.8–7.7)
NEUTROPHILS # BLD AUTO: 2.5 K/UL (ref 1.8–7.7)
NEUTROPHILS # BLD AUTO: 2.6 K/UL (ref 1.8–7.7)
NEUTROPHILS # BLD AUTO: 2.7 K/UL (ref 1.8–7.7)
NEUTROPHILS # BLD AUTO: 2.8 K/UL (ref 1.8–7.7)
NEUTROPHILS # BLD AUTO: 2.9 K/UL (ref 1.8–7.7)
NEUTROPHILS # BLD AUTO: 3 K/UL (ref 1.8–7.7)
NEUTROPHILS # BLD AUTO: 3 K/UL (ref 1.8–7.7)
NEUTROPHILS # BLD AUTO: 3.1 K/UL (ref 1.8–7.7)
NEUTROPHILS # BLD AUTO: 3.1 K/UL (ref 1.8–7.7)
NEUTROPHILS # BLD AUTO: 3.2 K/UL (ref 1.8–7.7)
NEUTROPHILS # BLD AUTO: 3.3 K/UL (ref 1.8–7.7)
NEUTROPHILS # BLD AUTO: 3.7 K/UL (ref 1.8–7.7)
NEUTROPHILS # BLD AUTO: 3.8 K/UL (ref 1.8–7.7)
NEUTROPHILS # BLD AUTO: 3.9 K/UL (ref 1.8–7.7)
NEUTROPHILS # BLD AUTO: 4.1 K/UL (ref 1.8–7.7)
NEUTROPHILS # BLD AUTO: 4.5 K/UL (ref 1.8–7.7)
NEUTROPHILS # BLD AUTO: 5.2 K/UL (ref 1.8–7.7)
NEUTROPHILS # BLD AUTO: 6.3 K/UL (ref 1.8–7.7)
NEUTROPHILS # BLD AUTO: 6.4 K/UL (ref 1.8–7.7)
NEUTROPHILS # BLD AUTO: 6.6 K/UL (ref 1.8–7.7)
NEUTROPHILS # BLD AUTO: 6.7 K/UL (ref 1.8–7.7)
NEUTROPHILS # BLD AUTO: 6.7 K/UL (ref 1.8–7.7)
NEUTROPHILS # BLD AUTO: 6.9 K/UL (ref 1.8–7.7)
NEUTROPHILS # BLD AUTO: 8 K/UL (ref 1.8–7.7)
NEUTROPHILS NFR BLD: 29 % (ref 38–73)
NEUTROPHILS NFR BLD: 43.9 % (ref 38–73)
NEUTROPHILS NFR BLD: 44.2 % (ref 38–73)
NEUTROPHILS NFR BLD: 45.1 % (ref 38–73)
NEUTROPHILS NFR BLD: 45.4 % (ref 38–73)
NEUTROPHILS NFR BLD: 47.4 % (ref 38–73)
NEUTROPHILS NFR BLD: 47.5 % (ref 38–73)
NEUTROPHILS NFR BLD: 48.4 % (ref 38–73)
NEUTROPHILS NFR BLD: 48.6 % (ref 38–73)
NEUTROPHILS NFR BLD: 48.8 % (ref 38–73)
NEUTROPHILS NFR BLD: 48.8 % (ref 38–73)
NEUTROPHILS NFR BLD: 50 % (ref 38–73)
NEUTROPHILS NFR BLD: 50.7 % (ref 38–73)
NEUTROPHILS NFR BLD: 50.8 % (ref 38–73)
NEUTROPHILS NFR BLD: 51 % (ref 38–73)
NEUTROPHILS NFR BLD: 52.5 % (ref 38–73)
NEUTROPHILS NFR BLD: 53.5 % (ref 38–73)
NEUTROPHILS NFR BLD: 54.9 % (ref 38–73)
NEUTROPHILS NFR BLD: 55.6 % (ref 38–73)
NEUTROPHILS NFR BLD: 58.2 % (ref 38–73)
NEUTROPHILS NFR BLD: 58.5 % (ref 38–73)
NEUTROPHILS NFR BLD: 59.6 % (ref 38–73)
NEUTROPHILS NFR BLD: 60 % (ref 38–73)
NEUTROPHILS NFR BLD: 63.4 % (ref 38–73)
NEUTROPHILS NFR BLD: 64 % (ref 38–73)
NEUTROPHILS NFR BLD: 64 % (ref 38–73)
NEUTROPHILS NFR BLD: 64.5 % (ref 38–73)
NEUTROPHILS NFR BLD: 64.7 % (ref 38–73)
NEUTROPHILS NFR BLD: 65 % (ref 38–73)
NEUTROPHILS NFR BLD: 65.2 % (ref 38–73)
NEUTROPHILS NFR BLD: 65.7 % (ref 38–73)
NEUTROPHILS NFR BLD: 66.4 % (ref 38–73)
NEUTROPHILS NFR BLD: 67.8 % (ref 38–73)
NEUTROPHILS NFR BLD: 67.9 % (ref 38–73)
NEUTROPHILS NFR BLD: 69 % (ref 38–73)
NEUTROPHILS NFR BLD: 70 % (ref 38–73)
NEUTROPHILS NFR BLD: 70 % (ref 38–73)
NEUTROPHILS NFR BLD: 72.3 % (ref 38–73)
NEUTROPHILS NFR BLD: 76.6 % (ref 38–73)
NEUTROPHILS NFR BLD: 76.9 % (ref 38–73)
NEUTROPHILS NFR BLD: 79 % (ref 38–73)
NEUTROPHILS NFR BLD: 80.5 % (ref 38–73)
NEUTS BAND NFR BLD MANUAL: 11 %
NEUTS BAND NFR BLD MANUAL: 12 %
NEUTS BAND NFR BLD MANUAL: 40 %
NEUTS BAND NFR BLD MANUAL: 5 %
NEUTS BAND NFR BLD MANUAL: 5 %
NEUTS BAND NFR BLD MANUAL: 9 %
NITRITE UR QL STRIP: NEGATIVE
NITRITE UR QL STRIP: POSITIVE
NITRITE UR QL STRIP: POSITIVE
NOTIFIED BY: ABNORMAL
NRBC BLD-RTO: 0 /100 WBC
NUM UNITS TRANS PACKED RBC: NORMAL
O2DEVICE: ABNORMAL
O2DEVICE: NORMAL
OB PNL STL: NEGATIVE
OB PNL STL: NEGATIVE
OB PNL STL: POSITIVE
OPIATES UR QL SCN: ABNORMAL
PCO2 BLDA: 30.9 MMHG (ref 35–45)
PCO2 BLDA: 67.6 MMHG
PCP UR QL SCN>25 NG/ML: NEGATIVE
PH SMN: 7.17 [PH] (ref 7.34–7.45)
PH SMN: 7.25 [PH] (ref 7.21–7.31)
PH UR STRIP: 5 [PH] (ref 5–8)
PH UR STRIP: 6 [PH] (ref 5–8)
PHOSPHATE SERPL-MCNC: 2.2 MG/DL (ref 2.7–4.5)
PHOSPHATE SERPL-MCNC: 2.6 MG/DL (ref 2.7–4.5)
PHOSPHATE SERPL-MCNC: 2.7 MG/DL (ref 2.7–4.5)
PHOSPHATE SERPL-MCNC: 2.8 MG/DL (ref 2.7–4.5)
PHOSPHATE SERPL-MCNC: 2.8 MG/DL (ref 2.7–4.5)
PHOSPHATE SERPL-MCNC: 2.9 MG/DL (ref 2.7–4.5)
PHOSPHATE SERPL-MCNC: 3 MG/DL (ref 2.7–4.5)
PLATELET # BLD AUTO: 113 K/UL (ref 150–350)
PLATELET # BLD AUTO: 122 K/UL (ref 150–350)
PLATELET # BLD AUTO: 124 K/UL (ref 150–350)
PLATELET # BLD AUTO: 137 K/UL (ref 150–350)
PLATELET # BLD AUTO: 143 K/UL (ref 150–350)
PLATELET # BLD AUTO: 182 K/UL (ref 150–350)
PLATELET # BLD AUTO: 187 K/UL (ref 150–350)
PLATELET # BLD AUTO: 201 K/UL (ref 150–350)
PLATELET # BLD AUTO: 209 K/UL (ref 150–450)
PLATELET # BLD AUTO: 219 K/UL (ref 150–450)
PLATELET # BLD AUTO: 223 K/UL (ref 150–450)
PLATELET # BLD AUTO: 225 K/UL (ref 150–450)
PLATELET # BLD AUTO: 235 K/UL (ref 150–350)
PLATELET # BLD AUTO: 241 K/UL (ref 150–450)
PLATELET # BLD AUTO: 242 K/UL (ref 150–450)
PLATELET # BLD AUTO: 244 K/UL (ref 150–450)
PLATELET # BLD AUTO: 245 K/UL (ref 150–350)
PLATELET # BLD AUTO: 246 K/UL (ref 150–450)
PLATELET # BLD AUTO: 256 K/UL (ref 150–450)
PLATELET # BLD AUTO: 258 K/UL (ref 150–450)
PLATELET # BLD AUTO: 259 K/UL (ref 150–450)
PLATELET # BLD AUTO: 269 K/UL (ref 150–450)
PLATELET # BLD AUTO: 275 K/UL (ref 150–450)
PLATELET # BLD AUTO: 276 K/UL (ref 150–450)
PLATELET # BLD AUTO: 277 K/UL (ref 150–350)
PLATELET # BLD AUTO: 277 K/UL (ref 150–450)
PLATELET # BLD AUTO: 280 K/UL (ref 150–450)
PLATELET # BLD AUTO: 284 K/UL (ref 150–450)
PLATELET # BLD AUTO: 296 K/UL (ref 150–450)
PLATELET # BLD AUTO: 307 K/UL (ref 150–350)
PLATELET # BLD AUTO: 308 K/UL (ref 150–350)
PLATELET # BLD AUTO: 316 K/UL (ref 150–350)
PLATELET # BLD AUTO: 342 K/UL (ref 150–350)
PLATELET # BLD AUTO: 364 K/UL (ref 150–350)
PLATELET # BLD AUTO: 377 K/UL (ref 150–350)
PLATELET # BLD AUTO: 395 K/UL (ref 150–350)
PLATELET # BLD AUTO: 422 K/UL (ref 150–350)
PLATELET # BLD AUTO: 426 K/UL (ref 150–350)
PLATELET # BLD AUTO: 429 K/UL (ref 150–350)
PLATELET # BLD AUTO: 442 K/UL (ref 150–350)
PLATELET # BLD AUTO: 445 K/UL (ref 150–350)
PLATELET # BLD AUTO: 475 K/UL (ref 150–350)
PLATELET # BLD AUTO: 479 K/UL (ref 150–350)
PLATELET BLD QL SMEAR: ABNORMAL
PLATELET BLD QL SMEAR: ABNORMAL
PMV BLD AUTO: 10 FL (ref 9.2–12.9)
PMV BLD AUTO: 10 FL (ref 9.2–12.9)
PMV BLD AUTO: 10.1 FL (ref 9.2–12.9)
PMV BLD AUTO: 10.2 FL (ref 9.2–12.9)
PMV BLD AUTO: 10.3 FL (ref 9.2–12.9)
PMV BLD AUTO: 10.4 FL (ref 9.2–12.9)
PMV BLD AUTO: 10.4 FL (ref 9.2–12.9)
PMV BLD AUTO: 10.5 FL (ref 9.2–12.9)
PMV BLD AUTO: 10.5 FL (ref 9.2–12.9)
PMV BLD AUTO: 10.6 FL (ref 9.2–12.9)
PMV BLD AUTO: 10.7 FL (ref 9.2–12.9)
PMV BLD AUTO: 10.7 FL (ref 9.2–12.9)
PMV BLD AUTO: 10.9 FL (ref 9.2–12.9)
PMV BLD AUTO: 11 FL (ref 9.2–12.9)
PMV BLD AUTO: 11.2 FL (ref 9.2–12.9)
PMV BLD AUTO: 11.3 FL (ref 9.2–12.9)
PMV BLD AUTO: 11.5 FL (ref 9.2–12.9)
PMV BLD AUTO: 11.7 FL (ref 9.2–12.9)
PMV BLD AUTO: 11.9 FL (ref 9.2–12.9)
PMV BLD AUTO: 12 FL (ref 9.2–12.9)
PMV BLD AUTO: 12.1 FL (ref 9.2–12.9)
PMV BLD AUTO: 12.5 FL (ref 9.2–12.9)
PMV BLD AUTO: 9.6 FL (ref 9.2–12.9)
PMV BLD AUTO: 9.6 FL (ref 9.2–12.9)
PMV BLD AUTO: 9.7 FL (ref 9.2–12.9)
PMV BLD AUTO: 9.8 FL (ref 9.2–12.9)
PMV BLD AUTO: 9.9 FL (ref 9.2–12.9)
PO2 BLDA: 101 MMHG (ref 80–100)
PO2 BLDA: 27.4 MMHG
POC BASE DEFICIT: -17.2 MMOL/L
POC BASE DEFICIT: 2.4 MMOL/L
POC MOLECULAR INFLUENZA A AGN: NEGATIVE
POC MOLECULAR INFLUENZA B AGN: NEGATIVE
POC NOTIFIED NOTE: ABNORMAL
POC PERFORMED BY: ABNORMAL
POC PERFORMED BY: NORMAL
POC SATURATED O2: 46.6 %
POC SATURATED O2: 96 %
POC TCO2: 29.5 MMOL/L
POC TEMPERATURE: 37 C
POC TEMPERATURE: 37 C
POCT GLUCOSE: 101 MG/DL (ref 70–110)
POCT GLUCOSE: 102 MG/DL (ref 70–110)
POCT GLUCOSE: 103 MG/DL (ref 70–110)
POCT GLUCOSE: 104 MG/DL (ref 70–110)
POCT GLUCOSE: 104 MG/DL (ref 70–110)
POCT GLUCOSE: 105 MG/DL (ref 70–110)
POCT GLUCOSE: 106 MG/DL (ref 70–110)
POCT GLUCOSE: 107 MG/DL (ref 70–110)
POCT GLUCOSE: 107 MG/DL (ref 70–110)
POCT GLUCOSE: 108 MG/DL (ref 70–110)
POCT GLUCOSE: 108 MG/DL (ref 70–110)
POCT GLUCOSE: 109 MG/DL (ref 70–110)
POCT GLUCOSE: 109 MG/DL (ref 70–110)
POCT GLUCOSE: 110 MG/DL (ref 70–110)
POCT GLUCOSE: 110 MG/DL (ref 70–110)
POCT GLUCOSE: 112 MG/DL (ref 70–110)
POCT GLUCOSE: 112 MG/DL (ref 70–110)
POCT GLUCOSE: 113 MG/DL (ref 70–110)
POCT GLUCOSE: 115 MG/DL (ref 70–110)
POCT GLUCOSE: 115 MG/DL (ref 70–110)
POCT GLUCOSE: 117 MG/DL (ref 70–110)
POCT GLUCOSE: 117 MG/DL (ref 70–110)
POCT GLUCOSE: 119 MG/DL (ref 70–110)
POCT GLUCOSE: 120 MG/DL (ref 70–110)
POCT GLUCOSE: 121 MG/DL (ref 70–110)
POCT GLUCOSE: 121 MG/DL (ref 70–110)
POCT GLUCOSE: 122 MG/DL (ref 70–110)
POCT GLUCOSE: 123 MG/DL (ref 70–110)
POCT GLUCOSE: 126 MG/DL (ref 70–110)
POCT GLUCOSE: 127 MG/DL (ref 70–110)
POCT GLUCOSE: 129 MG/DL (ref 70–110)
POCT GLUCOSE: 131 MG/DL (ref 70–110)
POCT GLUCOSE: 133 MG/DL (ref 70–110)
POCT GLUCOSE: 146 MG/DL (ref 70–110)
POCT GLUCOSE: 159 MG/DL (ref 70–110)
POCT GLUCOSE: 163 MG/DL (ref 70–110)
POCT GLUCOSE: 205 MG/DL (ref 70–110)
POCT GLUCOSE: 34 MG/DL (ref 70–110)
POCT GLUCOSE: 36 MG/DL (ref 70–110)
POCT GLUCOSE: 48 MG/DL (ref 70–110)
POCT GLUCOSE: 54 MG/DL (ref 70–110)
POCT GLUCOSE: 57 MG/DL (ref 70–110)
POCT GLUCOSE: 65 MG/DL (ref 70–110)
POCT GLUCOSE: 68 MG/DL (ref 70–110)
POCT GLUCOSE: 74 MG/DL (ref 70–110)
POCT GLUCOSE: 74 MG/DL (ref 70–110)
POCT GLUCOSE: 75 MG/DL (ref 70–110)
POCT GLUCOSE: 76 MG/DL (ref 70–110)
POCT GLUCOSE: 77 MG/DL (ref 70–110)
POCT GLUCOSE: 77 MG/DL (ref 70–110)
POCT GLUCOSE: 78 MG/DL (ref 70–110)
POCT GLUCOSE: 79 MG/DL (ref 70–110)
POCT GLUCOSE: 80 MG/DL (ref 70–110)
POCT GLUCOSE: 81 MG/DL (ref 70–110)
POCT GLUCOSE: 83 MG/DL (ref 70–110)
POCT GLUCOSE: 87 MG/DL (ref 70–110)
POCT GLUCOSE: 88 MG/DL (ref 70–110)
POCT GLUCOSE: 88 MG/DL (ref 70–110)
POCT GLUCOSE: 91 MG/DL (ref 70–110)
POCT GLUCOSE: 92 MG/DL (ref 70–110)
POCT GLUCOSE: 93 MG/DL (ref 70–110)
POCT GLUCOSE: 94 MG/DL (ref 70–110)
POCT GLUCOSE: 95 MG/DL (ref 70–110)
POCT GLUCOSE: 96 MG/DL (ref 70–110)
POCT GLUCOSE: 97 MG/DL (ref 70–110)
POCT GLUCOSE: 97 MG/DL (ref 70–110)
POCT GLUCOSE: 99 MG/DL (ref 70–110)
POCT GLUCOSE: 99 MG/DL (ref 70–110)
POTASSIUM SERPL-SCNC: 2.9 MMOL/L (ref 3.5–5.1)
POTASSIUM SERPL-SCNC: 3 MMOL/L (ref 3.5–5.1)
POTASSIUM SERPL-SCNC: 3.2 MMOL/L (ref 3.5–5.1)
POTASSIUM SERPL-SCNC: 3.3 MMOL/L (ref 3.5–5.1)
POTASSIUM SERPL-SCNC: 3.4 MMOL/L (ref 3.5–5.1)
POTASSIUM SERPL-SCNC: 3.5 MMOL/L (ref 3.5–5.1)
POTASSIUM SERPL-SCNC: 3.6 MMOL/L (ref 3.5–5.1)
POTASSIUM SERPL-SCNC: 3.7 MMOL/L (ref 3.5–5.1)
POTASSIUM SERPL-SCNC: 3.8 MMOL/L (ref 3.5–5.1)
POTASSIUM SERPL-SCNC: 3.9 MMOL/L (ref 3.5–5.1)
POTASSIUM SERPL-SCNC: 4 MMOL/L (ref 3.5–5.1)
POTASSIUM SERPL-SCNC: 4.1 MMOL/L (ref 3.5–5.1)
POTASSIUM SERPL-SCNC: 4.2 MMOL/L (ref 3.5–5.1)
POTASSIUM SERPL-SCNC: 4.4 MMOL/L (ref 3.5–5.1)
POTASSIUM SERPL-SCNC: 4.6 MMOL/L (ref 3.5–5.1)
POTASSIUM SERPL-SCNC: 5.2 MMOL/L (ref 3.5–5.1)
POTASSIUM SERPL-SCNC: 5.3 MMOL/L (ref 3.5–5.1)
POTASSIUM SERPL-SCNC: 5.4 MMOL/L (ref 3.5–5.1)
POTASSIUM SERPL-SCNC: 5.9 MMOL/L (ref 3.5–5.1)
POTASSIUM SERPL-SCNC: 7.8 MMOL/L (ref 3.5–5.1)
PROT SERPL-MCNC: 4.8 G/DL (ref 6–8.4)
PROT SERPL-MCNC: 5.1 G/DL (ref 6–8.4)
PROT SERPL-MCNC: 5.1 G/DL (ref 6–8.4)
PROT SERPL-MCNC: 5.2 G/DL (ref 6–8.4)
PROT SERPL-MCNC: 5.2 G/DL (ref 6–8.4)
PROT SERPL-MCNC: 5.4 G/DL (ref 6–8.4)
PROT SERPL-MCNC: 5.4 G/DL (ref 6–8.4)
PROT SERPL-MCNC: 5.5 G/DL (ref 6–8.4)
PROT SERPL-MCNC: 5.6 G/DL (ref 6–8.4)
PROT SERPL-MCNC: 5.7 G/DL (ref 6–8.4)
PROT SERPL-MCNC: 5.8 G/DL (ref 6–8.4)
PROT SERPL-MCNC: 5.9 G/DL (ref 6–8.4)
PROT SERPL-MCNC: 5.9 G/DL (ref 6–8.4)
PROT SERPL-MCNC: 6 G/DL (ref 6–8.4)
PROT SERPL-MCNC: 6 G/DL (ref 6–8.4)
PROT SERPL-MCNC: 6.1 G/DL (ref 6–8.4)
PROT SERPL-MCNC: 6.2 G/DL (ref 6–8.4)
PROT SERPL-MCNC: 6.3 G/DL (ref 6–8.4)
PROT SERPL-MCNC: 6.3 G/DL (ref 6–8.4)
PROT SERPL-MCNC: 6.4 G/DL (ref 6–8.4)
PROT SERPL-MCNC: 6.4 G/DL (ref 6–8.4)
PROT SERPL-MCNC: 6.5 G/DL (ref 6–8.4)
PROT SERPL-MCNC: 6.5 G/DL (ref 6–8.4)
PROT SERPL-MCNC: 6.6 G/DL (ref 6–8.4)
PROT SERPL-MCNC: 6.7 G/DL (ref 6–8.4)
PROT SERPL-MCNC: 6.8 G/DL (ref 6–8.4)
PROT SERPL-MCNC: 6.8 G/DL (ref 6–8.4)
PROT SERPL-MCNC: 6.9 G/DL (ref 6–8.4)
PROT SERPL-MCNC: 6.9 G/DL (ref 6–8.4)
PROT SERPL-MCNC: 7 G/DL (ref 6–8.4)
PROT SERPL-MCNC: 7 G/DL (ref 6–8.4)
PROT UR QL STRIP: ABNORMAL
PROTHROMBIN TIME: 10.2 SEC (ref 9–12.5)
PROVIDER NOTIFIED: ABNORMAL
RBC # BLD AUTO: 2.31 M/UL (ref 4–5.4)
RBC # BLD AUTO: 2.41 M/UL (ref 4–5.4)
RBC # BLD AUTO: 2.57 M/UL (ref 4–5.4)
RBC # BLD AUTO: 2.68 M/UL (ref 4–5.4)
RBC # BLD AUTO: 2.75 M/UL (ref 4–5.4)
RBC # BLD AUTO: 2.77 M/UL (ref 4–5.4)
RBC # BLD AUTO: 2.77 M/UL (ref 4–5.4)
RBC # BLD AUTO: 2.83 M/UL (ref 4–5.4)
RBC # BLD AUTO: 2.85 M/UL (ref 4–5.4)
RBC # BLD AUTO: 2.87 M/UL (ref 4–5.4)
RBC # BLD AUTO: 2.88 M/UL (ref 4–5.4)
RBC # BLD AUTO: 2.94 M/UL (ref 4–5.4)
RBC # BLD AUTO: 2.95 M/UL (ref 4–5.4)
RBC # BLD AUTO: 2.96 M/UL (ref 4–5.4)
RBC # BLD AUTO: 2.97 M/UL (ref 4–5.4)
RBC # BLD AUTO: 3.01 M/UL (ref 4–5.4)
RBC # BLD AUTO: 3.08 M/UL (ref 4–5.4)
RBC # BLD AUTO: 3.09 M/UL (ref 4–5.4)
RBC # BLD AUTO: 3.11 M/UL (ref 4–5.4)
RBC # BLD AUTO: 3.25 M/UL (ref 4–5.4)
RBC # BLD AUTO: 3.27 M/UL (ref 4–5.4)
RBC # BLD AUTO: 3.46 M/UL (ref 4–5.4)
RBC # BLD AUTO: 3.49 M/UL (ref 4–5.4)
RBC # BLD AUTO: 3.56 M/UL (ref 4–5.4)
RBC # BLD AUTO: 3.59 M/UL (ref 4–5.4)
RBC # BLD AUTO: 3.63 M/UL (ref 4–5.4)
RBC # BLD AUTO: 3.71 M/UL (ref 4–5.4)
RBC # BLD AUTO: 3.72 M/UL (ref 4–5.4)
RBC # BLD AUTO: 3.75 M/UL (ref 4–5.4)
RBC # BLD AUTO: 3.76 M/UL (ref 4–5.4)
RBC # BLD AUTO: 3.78 M/UL (ref 4–5.4)
RBC # BLD AUTO: 3.83 M/UL (ref 4–5.4)
RBC # BLD AUTO: 3.85 M/UL (ref 4–5.4)
RBC # BLD AUTO: 3.85 M/UL (ref 4–5.4)
RBC # BLD AUTO: 3.86 M/UL (ref 4–5.4)
RBC # BLD AUTO: 3.9 M/UL (ref 4–5.4)
RBC # BLD AUTO: 3.93 M/UL (ref 4–5.4)
RBC # BLD AUTO: 4.01 M/UL (ref 4–5.4)
RBC # BLD AUTO: 4.14 M/UL (ref 4–5.4)
RBC # BLD AUTO: 4.33 M/UL (ref 4–5.4)
RBC # BLD AUTO: 4.34 M/UL (ref 4–5.4)
RBC # BLD AUTO: 4.4 M/UL (ref 4–5.4)
RBC # BLD AUTO: 4.51 M/UL (ref 4–5.4)
RBC #/AREA URNS HPF: 0 /HPF (ref 0–4)
RBC #/AREA URNS HPF: 1 /HPF (ref 0–4)
RBC #/AREA URNS HPF: 7 /HPF (ref 0–4)
SARS-COV-2 RDRP RESP QL NAA+PROBE: NEGATIVE
SODIUM SERPL-SCNC: 125 MMOL/L (ref 136–145)
SODIUM SERPL-SCNC: 127 MMOL/L (ref 136–145)
SODIUM SERPL-SCNC: 133 MMOL/L (ref 136–145)
SODIUM SERPL-SCNC: 133 MMOL/L (ref 136–145)
SODIUM SERPL-SCNC: 134 MMOL/L (ref 136–145)
SODIUM SERPL-SCNC: 135 MMOL/L (ref 136–145)
SODIUM SERPL-SCNC: 136 MMOL/L (ref 136–145)
SODIUM SERPL-SCNC: 137 MMOL/L (ref 136–145)
SODIUM SERPL-SCNC: 138 MMOL/L (ref 136–145)
SODIUM SERPL-SCNC: 139 MMOL/L (ref 136–145)
SODIUM SERPL-SCNC: 140 MMOL/L (ref 136–145)
SODIUM SERPL-SCNC: 141 MMOL/L (ref 136–145)
SODIUM SERPL-SCNC: 142 MMOL/L (ref 136–145)
SODIUM SERPL-SCNC: 143 MMOL/L (ref 136–145)
SP GR UR STRIP: 1.01 (ref 1–1.03)
SP GR UR STRIP: 1.02 (ref 1–1.03)
SPECIMEN SOURCE: ABNORMAL
SPECIMEN SOURCE: NORMAL
SQUAMOUS #/AREA URNS HPF: 1 /HPF
SQUAMOUS #/AREA URNS HPF: 4 /HPF
SQUAMOUS #/AREA URNS HPF: 5 /HPF
SQUAMOUS #/AREA URNS HPF: 8 /HPF
SQUAMOUS #/AREA URNS HPF: >36 /HPF
TB INDURATION 48 - 72 HR READ: 0 MM
TOTAL IRON BINDING CAPACITY: 341 UG/DL (ref 250–450)
TOXICOLOGY INFORMATION: ABNORMAL
TROPONIN I SERPL DL<=0.01 NG/ML-MCNC: 7.4 PG/ML (ref 0–60)
TROPONIN I SERPL DL<=0.01 NG/ML-MCNC: <0.02 NG/ML (ref 0–0.03)
TROPONIN I SERPL DL<=0.01 NG/ML-MCNC: <0.02 NG/ML (ref 0–0.03)
URN SPEC COLLECT METH UR: ABNORMAL
UROBILINOGEN UR STRIP-ACNC: NEGATIVE EU/DL
VANCOMYCIN SERPL-MCNC: 16.4 UG/ML
VANCOMYCIN SERPL-MCNC: 18 UG/ML
VANCOMYCIN SERPL-MCNC: 21.4 UG/ML
VANCOMYCIN SERPL-MCNC: 22.4 UG/ML
VANCOMYCIN SERPL-MCNC: 23 UG/ML
VANCOMYCIN SERPL-MCNC: 42.9 UG/ML
WBC # BLD AUTO: 10.05 K/UL (ref 3.9–12.7)
WBC # BLD AUTO: 10.39 K/UL (ref 3.9–12.7)
WBC # BLD AUTO: 11.69 K/UL (ref 3.9–12.7)
WBC # BLD AUTO: 13.44 K/UL (ref 3.9–12.7)
WBC # BLD AUTO: 13.47 K/UL (ref 3.9–12.7)
WBC # BLD AUTO: 3.93 K/UL (ref 3.9–12.7)
WBC # BLD AUTO: 4.26 K/UL (ref 3.9–12.7)
WBC # BLD AUTO: 4.27 K/UL (ref 3.9–12.7)
WBC # BLD AUTO: 4.8 K/UL (ref 3.9–12.7)
WBC # BLD AUTO: 4.9 K/UL (ref 3.9–12.7)
WBC # BLD AUTO: 4.97 K/UL (ref 3.9–12.7)
WBC # BLD AUTO: 5.01 K/UL (ref 3.9–12.7)
WBC # BLD AUTO: 5.04 K/UL (ref 3.9–12.7)
WBC # BLD AUTO: 5.27 K/UL (ref 3.9–12.7)
WBC # BLD AUTO: 5.29 K/UL (ref 3.9–12.7)
WBC # BLD AUTO: 5.62 K/UL (ref 3.9–12.7)
WBC # BLD AUTO: 5.67 K/UL (ref 3.9–12.7)
WBC # BLD AUTO: 5.76 K/UL (ref 3.9–12.7)
WBC # BLD AUTO: 5.77 K/UL (ref 3.9–12.7)
WBC # BLD AUTO: 5.83 K/UL (ref 3.9–12.7)
WBC # BLD AUTO: 5.84 K/UL (ref 3.9–12.7)
WBC # BLD AUTO: 5.87 K/UL (ref 3.9–12.7)
WBC # BLD AUTO: 5.91 K/UL (ref 3.9–12.7)
WBC # BLD AUTO: 5.96 K/UL (ref 3.9–12.7)
WBC # BLD AUTO: 5.99 K/UL (ref 3.9–12.7)
WBC # BLD AUTO: 6 K/UL (ref 3.9–12.7)
WBC # BLD AUTO: 6.03 K/UL (ref 3.9–12.7)
WBC # BLD AUTO: 6.07 K/UL (ref 3.9–12.7)
WBC # BLD AUTO: 6.56 K/UL (ref 3.9–12.7)
WBC # BLD AUTO: 6.57 K/UL (ref 3.9–12.7)
WBC # BLD AUTO: 6.6 K/UL (ref 3.9–12.7)
WBC # BLD AUTO: 6.97 K/UL (ref 3.9–12.7)
WBC # BLD AUTO: 6.99 K/UL (ref 3.9–12.7)
WBC # BLD AUTO: 7.41 K/UL (ref 3.9–12.7)
WBC # BLD AUTO: 7.82 K/UL (ref 3.9–12.7)
WBC # BLD AUTO: 8.22 K/UL (ref 3.9–12.7)
WBC # BLD AUTO: 8.38 K/UL (ref 3.9–12.7)
WBC # BLD AUTO: 8.5 K/UL (ref 3.9–12.7)
WBC # BLD AUTO: 8.66 K/UL (ref 3.9–12.7)
WBC # BLD AUTO: 8.93 K/UL (ref 3.9–12.7)
WBC # BLD AUTO: 9.23 K/UL (ref 3.9–12.7)
WBC # BLD AUTO: 9.86 K/UL (ref 3.9–12.7)
WBC # BLD AUTO: 9.93 K/UL (ref 3.9–12.7)
WBC #/AREA URNS HPF: 2 /HPF (ref 0–5)
WBC #/AREA URNS HPF: 5 /HPF (ref 0–5)
WBC #/AREA URNS HPF: >100 /HPF (ref 0–5)

## 2021-01-01 PROCEDURE — 80053 COMPREHEN METABOLIC PANEL: CPT | Performed by: EMERGENCY MEDICINE

## 2021-01-01 PROCEDURE — 97535 SELF CARE MNGMENT TRAINING: CPT

## 2021-01-01 PROCEDURE — 85027 COMPLETE CBC AUTOMATED: CPT

## 2021-01-01 PROCEDURE — 99900031 HC PATIENT EDUCATION (STAT)

## 2021-01-01 PROCEDURE — 63600175 PHARM REV CODE 636 W HCPCS: Performed by: INTERNAL MEDICINE

## 2021-01-01 PROCEDURE — 85025 COMPLETE CBC W/AUTO DIFF WBC: CPT

## 2021-01-01 PROCEDURE — 97110 THERAPEUTIC EXERCISES: CPT

## 2021-01-01 PROCEDURE — 97116 GAIT TRAINING THERAPY: CPT

## 2021-01-01 PROCEDURE — 25000003 PHARM REV CODE 250: Performed by: NURSE PRACTITIONER

## 2021-01-01 PROCEDURE — 25000003 PHARM REV CODE 250: Performed by: INTERNAL MEDICINE

## 2021-01-01 PROCEDURE — 80053 COMPREHEN METABOLIC PANEL: CPT | Performed by: INTERNAL MEDICINE

## 2021-01-01 PROCEDURE — 11000001 HC ACUTE MED/SURG PRIVATE ROOM

## 2021-01-01 PROCEDURE — 80053 COMPREHEN METABOLIC PANEL: CPT

## 2021-01-01 PROCEDURE — 97530 THERAPEUTIC ACTIVITIES: CPT

## 2021-01-01 PROCEDURE — 94761 N-INVAS EAR/PLS OXIMETRY MLT: CPT

## 2021-01-01 PROCEDURE — 94760 N-INVAS EAR/PLS OXIMETRY 1: CPT

## 2021-01-01 PROCEDURE — 83605 ASSAY OF LACTIC ACID: CPT | Performed by: EMERGENCY MEDICINE

## 2021-01-01 PROCEDURE — 96361 HYDRATE IV INFUSION ADD-ON: CPT

## 2021-01-01 PROCEDURE — 86900 BLOOD TYPING SEROLOGIC ABO: CPT | Performed by: EMERGENCY MEDICINE

## 2021-01-01 PROCEDURE — 83735 ASSAY OF MAGNESIUM: CPT

## 2021-01-01 PROCEDURE — 99900035 HC TECH TIME PER 15 MIN (STAT)

## 2021-01-01 PROCEDURE — 99291 CRITICAL CARE FIRST HOUR: CPT | Mod: 25

## 2021-01-01 PROCEDURE — 80202 ASSAY OF VANCOMYCIN: CPT

## 2021-01-01 PROCEDURE — 81000 URINALYSIS NONAUTO W/SCOPE: CPT

## 2021-01-01 PROCEDURE — 84100 ASSAY OF PHOSPHORUS: CPT

## 2021-01-01 PROCEDURE — 85007 BL SMEAR W/DIFF WBC COUNT: CPT | Performed by: INTERNAL MEDICINE

## 2021-01-01 PROCEDURE — 36415 COLL VENOUS BLD VENIPUNCTURE: CPT | Performed by: INTERNAL MEDICINE

## 2021-01-01 PROCEDURE — 27000221 HC OXYGEN, UP TO 24 HOURS

## 2021-01-01 PROCEDURE — 96365 THER/PROPH/DIAG IV INF INIT: CPT

## 2021-01-01 PROCEDURE — 81000 URINALYSIS NONAUTO W/SCOPE: CPT | Performed by: EMERGENCY MEDICINE

## 2021-01-01 PROCEDURE — S0030 INJECTION, METRONIDAZOLE: HCPCS | Performed by: INTERNAL MEDICINE

## 2021-01-01 PROCEDURE — 36415 COLL VENOUS BLD VENIPUNCTURE: CPT

## 2021-01-01 PROCEDURE — 20000000 HC ICU ROOM

## 2021-01-01 PROCEDURE — 92507 TX SP LANG VOICE COMM INDIV: CPT

## 2021-01-01 PROCEDURE — 97161 PT EVAL LOW COMPLEX 20 MIN: CPT

## 2021-01-01 PROCEDURE — 87088 URINE BACTERIA CULTURE: CPT | Performed by: EMERGENCY MEDICINE

## 2021-01-01 PROCEDURE — 63600175 PHARM REV CODE 636 W HCPCS: Performed by: EMERGENCY MEDICINE

## 2021-01-01 PROCEDURE — 85007 BL SMEAR W/DIFF WBC COUNT: CPT

## 2021-01-01 PROCEDURE — 25000003 PHARM REV CODE 250: Performed by: EMERGENCY MEDICINE

## 2021-01-01 PROCEDURE — 87040 BLOOD CULTURE FOR BACTERIA: CPT | Performed by: EMERGENCY MEDICINE

## 2021-01-01 PROCEDURE — 36415 COLL VENOUS BLD VENIPUNCTURE: CPT | Performed by: EMERGENCY MEDICINE

## 2021-01-01 PROCEDURE — P9016 RBC LEUKOCYTES REDUCED: HCPCS | Performed by: EMERGENCY MEDICINE

## 2021-01-01 PROCEDURE — 87077 CULTURE AEROBIC IDENTIFY: CPT | Performed by: EMERGENCY MEDICINE

## 2021-01-01 PROCEDURE — C9113 INJ PANTOPRAZOLE SODIUM, VIA: HCPCS | Performed by: INTERNAL MEDICINE

## 2021-01-01 PROCEDURE — 36430 TRANSFUSION BLD/BLD COMPNT: CPT

## 2021-01-01 PROCEDURE — 86580 TB INTRADERMAL TEST: CPT | Performed by: INTERNAL MEDICINE

## 2021-01-01 PROCEDURE — U0002 COVID-19 LAB TEST NON-CDC: HCPCS | Performed by: EMERGENCY MEDICINE

## 2021-01-01 PROCEDURE — 85025 COMPLETE CBC W/AUTO DIFF WBC: CPT | Performed by: INTERNAL MEDICINE

## 2021-01-01 PROCEDURE — 85027 COMPLETE CBC AUTOMATED: CPT | Performed by: INTERNAL MEDICINE

## 2021-01-01 PROCEDURE — 86920 COMPATIBILITY TEST SPIN: CPT | Performed by: EMERGENCY MEDICINE

## 2021-01-01 PROCEDURE — 93010 EKG 12-LEAD: ICD-10-PCS | Mod: ,,, | Performed by: INTERNAL MEDICINE

## 2021-01-01 PROCEDURE — 97162 PT EVAL MOD COMPLEX 30 MIN: CPT

## 2021-01-01 PROCEDURE — 99283 EMERGENCY DEPT VISIT LOW MDM: CPT

## 2021-01-01 PROCEDURE — 87086 URINE CULTURE/COLONY COUNT: CPT | Performed by: EMERGENCY MEDICINE

## 2021-01-01 PROCEDURE — 87040 BLOOD CULTURE FOR BACTERIA: CPT | Mod: 59

## 2021-01-01 PROCEDURE — 97166 OT EVAL MOD COMPLEX 45 MIN: CPT

## 2021-01-01 PROCEDURE — 87449 NOS EACH ORGANISM AG IA: CPT

## 2021-01-01 PROCEDURE — 51702 INSERT TEMP BLADDER CATH: CPT

## 2021-01-01 PROCEDURE — 82272 OCCULT BLD FECES 1-3 TESTS: CPT | Performed by: EMERGENCY MEDICINE

## 2021-01-01 PROCEDURE — 83690 ASSAY OF LIPASE: CPT

## 2021-01-01 PROCEDURE — 83605 ASSAY OF LACTIC ACID: CPT

## 2021-01-01 PROCEDURE — P9612 CATHETERIZE FOR URINE SPEC: HCPCS

## 2021-01-01 PROCEDURE — 80202 ASSAY OF VANCOMYCIN: CPT | Performed by: INTERNAL MEDICINE

## 2021-01-01 PROCEDURE — 87493 C DIFF AMPLIFIED PROBE: CPT

## 2021-01-01 PROCEDURE — 36600 WITHDRAWAL OF ARTERIAL BLOOD: CPT

## 2021-01-01 PROCEDURE — 87324 CLOSTRIDIUM AG IA: CPT

## 2021-01-01 PROCEDURE — 85025 COMPLETE CBC W/AUTO DIFF WBC: CPT | Performed by: EMERGENCY MEDICINE

## 2021-01-01 PROCEDURE — 92610 EVALUATE SWALLOWING FUNCTION: CPT

## 2021-01-01 PROCEDURE — 83735 ASSAY OF MAGNESIUM: CPT | Performed by: INTERNAL MEDICINE

## 2021-01-01 PROCEDURE — 83036 HEMOGLOBIN GLYCOSYLATED A1C: CPT | Performed by: EMERGENCY MEDICINE

## 2021-01-01 PROCEDURE — S0073 INJECTION, AZTREONAM, 500 MG: HCPCS | Performed by: EMERGENCY MEDICINE

## 2021-01-01 PROCEDURE — P9016 RBC LEUKOCYTES REDUCED: HCPCS

## 2021-01-01 PROCEDURE — 30200315 PPD INTRADERMAL TEST REV CODE 302: Performed by: INTERNAL MEDICINE

## 2021-01-01 PROCEDURE — 87186 SC STD MICRODIL/AGAR DIL: CPT | Performed by: EMERGENCY MEDICINE

## 2021-01-01 PROCEDURE — 87040 BLOOD CULTURE FOR BACTERIA: CPT | Mod: 59 | Performed by: EMERGENCY MEDICINE

## 2021-01-01 PROCEDURE — 84484 ASSAY OF TROPONIN QUANT: CPT

## 2021-01-01 PROCEDURE — 83690 ASSAY OF LIPASE: CPT | Performed by: EMERGENCY MEDICINE

## 2021-01-01 PROCEDURE — 99284 EMERGENCY DEPT VISIT MOD MDM: CPT | Mod: 25

## 2021-01-01 PROCEDURE — 36410 VNPNXR 3YR/> PHY/QHP DX/THER: CPT

## 2021-01-01 PROCEDURE — 96368 THER/DIAG CONCURRENT INF: CPT

## 2021-01-01 PROCEDURE — U0002 COVID-19 LAB TEST NON-CDC: HCPCS | Performed by: CLINICAL NURSE SPECIALIST

## 2021-01-01 PROCEDURE — C1751 CATH, INF, PER/CENT/MIDLINE: HCPCS

## 2021-01-01 PROCEDURE — 97165 OT EVAL LOW COMPLEX 30 MIN: CPT

## 2021-01-01 PROCEDURE — 83540 ASSAY OF IRON: CPT | Performed by: EMERGENCY MEDICINE

## 2021-01-01 PROCEDURE — 81000 URINALYSIS NONAUTO W/SCOPE: CPT | Mod: 59 | Performed by: EMERGENCY MEDICINE

## 2021-01-01 PROCEDURE — 94640 AIRWAY INHALATION TREATMENT: CPT

## 2021-01-01 PROCEDURE — 82962 GLUCOSE BLOOD TEST: CPT

## 2021-01-01 PROCEDURE — C9113 INJ PANTOPRAZOLE SODIUM, VIA: HCPCS | Performed by: EMERGENCY MEDICINE

## 2021-01-01 PROCEDURE — A4216 STERILE WATER/SALINE, 10 ML: HCPCS | Performed by: EMERGENCY MEDICINE

## 2021-01-01 PROCEDURE — 82077 ASSAY SPEC XCP UR&BREATH IA: CPT | Performed by: EMERGENCY MEDICINE

## 2021-01-01 PROCEDURE — 82803 BLOOD GASES ANY COMBINATION: CPT

## 2021-01-01 PROCEDURE — 86901 BLOOD TYPING SEROLOGIC RH(D): CPT | Performed by: EMERGENCY MEDICINE

## 2021-01-01 PROCEDURE — 82272 OCCULT BLD FECES 1-3 TESTS: CPT

## 2021-01-01 PROCEDURE — 93010 ELECTROCARDIOGRAM REPORT: CPT | Mod: ,,, | Performed by: INTERNAL MEDICINE

## 2021-01-01 PROCEDURE — 83036 HEMOGLOBIN GLYCOSYLATED A1C: CPT

## 2021-01-01 PROCEDURE — 96375 TX/PRO/DX INJ NEW DRUG ADDON: CPT

## 2021-01-01 PROCEDURE — 84484 ASSAY OF TROPONIN QUANT: CPT | Performed by: EMERGENCY MEDICINE

## 2021-01-01 PROCEDURE — 80048 BASIC METABOLIC PNL TOTAL CA: CPT

## 2021-01-01 PROCEDURE — 93005 ELECTROCARDIOGRAM TRACING: CPT

## 2021-01-01 PROCEDURE — 96367 TX/PROPH/DG ADDL SEQ IV INF: CPT

## 2021-01-01 PROCEDURE — 99285 EMERGENCY DEPT VISIT HI MDM: CPT | Mod: 25

## 2021-01-01 PROCEDURE — 80307 DRUG TEST PRSMV CHEM ANLYZR: CPT | Performed by: EMERGENCY MEDICINE

## 2021-01-01 PROCEDURE — A4216 STERILE WATER/SALINE, 10 ML: HCPCS | Performed by: INTERNAL MEDICINE

## 2021-01-01 PROCEDURE — 25000242 PHARM REV CODE 250 ALT 637 W/ HCPCS: Performed by: EMERGENCY MEDICINE

## 2021-01-01 PROCEDURE — 85610 PROTHROMBIN TIME: CPT

## 2021-01-01 PROCEDURE — 86900 BLOOD TYPING SEROLOGIC ABO: CPT

## 2021-01-01 PROCEDURE — 86920 COMPATIBILITY TEST SPIN: CPT

## 2021-01-01 RX ORDER — CEFEPIME HYDROCHLORIDE 1 G/50ML
2 INJECTION, SOLUTION INTRAVENOUS
Status: COMPLETED | OUTPATIENT
Start: 2021-01-01 | End: 2021-01-01

## 2021-01-01 RX ORDER — ACETAMINOPHEN 650 MG/1
650 SUPPOSITORY RECTAL EVERY 8 HOURS PRN
COMMUNITY
Start: 2021-01-01

## 2021-01-01 RX ORDER — MUPIROCIN 20 MG/G
OINTMENT TOPICAL 2 TIMES DAILY
Status: DISPENSED | OUTPATIENT
Start: 2021-01-01 | End: 2021-01-01

## 2021-01-01 RX ORDER — POTASSIUM CHLORIDE 7.45 MG/ML
10 INJECTION INTRAVENOUS
Status: COMPLETED | OUTPATIENT
Start: 2021-01-01 | End: 2021-01-01

## 2021-01-01 RX ORDER — BENZONATATE 200 MG/1
200 CAPSULE ORAL 3 TIMES DAILY PRN
COMMUNITY

## 2021-01-01 RX ORDER — FUROSEMIDE 10 MG/ML
80 INJECTION INTRAMUSCULAR; INTRAVENOUS
Status: DISCONTINUED | OUTPATIENT
Start: 2021-01-01 | End: 2021-01-01 | Stop reason: HOSPADM

## 2021-01-01 RX ORDER — LEVOFLOXACIN 500 MG/1
500 TABLET, FILM COATED ORAL DAILY
Qty: 7 TABLET | Refills: 0 | Status: SHIPPED | OUTPATIENT
Start: 2021-01-01 | End: 2021-01-01

## 2021-01-01 RX ORDER — ACETAMINOPHEN 325 MG/1
650 TABLET ORAL EVERY 6 HOURS PRN
Status: DISCONTINUED | OUTPATIENT
Start: 2021-01-01 | End: 2021-01-01 | Stop reason: HOSPADM

## 2021-01-01 RX ORDER — CEFEPIME HYDROCHLORIDE 1 G/50ML
2 INJECTION, SOLUTION INTRAVENOUS
Status: DISCONTINUED | OUTPATIENT
Start: 2021-01-01 | End: 2021-01-01

## 2021-01-01 RX ORDER — HYOSCYAMINE SULFATE 0.125 MG
125 TABLET ORAL EVERY 4 HOURS PRN
COMMUNITY

## 2021-01-01 RX ORDER — HYDRALAZINE HYDROCHLORIDE 25 MG/1
25 TABLET, FILM COATED ORAL EVERY 12 HOURS PRN
Status: DISCONTINUED | OUTPATIENT
Start: 2021-01-01 | End: 2021-01-01 | Stop reason: HOSPADM

## 2021-01-01 RX ORDER — MORPHINE SULFATE 20 MG/ML
10 SOLUTION ORAL EVERY 4 HOURS PRN
COMMUNITY

## 2021-01-01 RX ORDER — HYDROCODONE BITARTRATE AND ACETAMINOPHEN 500; 5 MG/1; MG/1
TABLET ORAL
Status: DISCONTINUED | OUTPATIENT
Start: 2021-01-01 | End: 2021-01-01 | Stop reason: HOSPADM

## 2021-01-01 RX ORDER — IBUPROFEN 200 MG
16 TABLET ORAL
Status: DISCONTINUED | OUTPATIENT
Start: 2021-01-01 | End: 2021-01-01 | Stop reason: HOSPADM

## 2021-01-01 RX ORDER — CALCIUM GLUCONATE 20 MG/ML
1000 INJECTION, SOLUTION INTRAVENOUS ONCE
Status: DISCONTINUED | OUTPATIENT
Start: 2021-01-01 | End: 2021-01-01

## 2021-01-01 RX ORDER — MEROPENEM AND SODIUM CHLORIDE 500 MG/50ML
500 INJECTION, SOLUTION INTRAVENOUS
Status: DISCONTINUED | OUTPATIENT
Start: 2021-01-01 | End: 2021-01-01

## 2021-01-01 RX ORDER — ACETAMINOPHEN 325 MG/1
650 TABLET ORAL EVERY 8 HOURS PRN
Status: DISCONTINUED | OUTPATIENT
Start: 2021-01-01 | End: 2021-01-01 | Stop reason: HOSPADM

## 2021-01-01 RX ORDER — CALCIUM GLUCONATE 20 MG/ML
1000 INJECTION, SOLUTION INTRAVENOUS ONCE
Status: COMPLETED | OUTPATIENT
Start: 2021-01-01 | End: 2021-01-01

## 2021-01-01 RX ORDER — LACTULOSE 10 G/15ML
SOLUTION ORAL; RECTAL 4 TIMES DAILY
COMMUNITY

## 2021-01-01 RX ORDER — GABAPENTIN 100 MG/1
100 CAPSULE ORAL 2 TIMES DAILY
Status: DISCONTINUED | OUTPATIENT
Start: 2021-01-01 | End: 2021-01-01 | Stop reason: HOSPADM

## 2021-01-01 RX ORDER — PANTOPRAZOLE SODIUM 40 MG/1
40 TABLET, DELAYED RELEASE ORAL 2 TIMES DAILY
Status: DISCONTINUED | OUTPATIENT
Start: 2021-01-01 | End: 2021-01-01 | Stop reason: HOSPADM

## 2021-01-01 RX ORDER — MEROPENEM AND SODIUM CHLORIDE 1 G/50ML
1 INJECTION, SOLUTION INTRAVENOUS
Status: DISCONTINUED | OUTPATIENT
Start: 2021-01-01 | End: 2021-01-01

## 2021-01-01 RX ORDER — METHYLPREDNISOLONE SOD SUCC 125 MG
125 VIAL (EA) INJECTION EVERY 6 HOURS
Status: DISCONTINUED | OUTPATIENT
Start: 2021-01-01 | End: 2021-01-01 | Stop reason: HOSPADM

## 2021-01-01 RX ORDER — HYDROCORTISONE 25 MG/G
CREAM TOPICAL 2 TIMES DAILY
Qty: 30 G | Refills: 1 | Status: SHIPPED | OUTPATIENT
Start: 2021-01-01 | End: 2021-01-01

## 2021-01-01 RX ORDER — GLUCAGON 1 MG
1 KIT INJECTION
Status: DISCONTINUED | OUTPATIENT
Start: 2021-01-01 | End: 2021-01-01 | Stop reason: HOSPADM

## 2021-01-01 RX ORDER — NOREPINEPHRINE BITARTRATE/D5W 4MG/250ML
0.05 PLASTIC BAG, INJECTION (ML) INTRAVENOUS CONTINUOUS
Status: DISCONTINUED | OUTPATIENT
Start: 2021-01-01 | End: 2021-01-01

## 2021-01-01 RX ORDER — INSULIN ASPART 100 [IU]/ML
0-5 INJECTION, SOLUTION INTRAVENOUS; SUBCUTANEOUS 2 TIMES DAILY PRN
Status: DISCONTINUED | OUTPATIENT
Start: 2021-01-01 | End: 2021-01-01 | Stop reason: HOSPADM

## 2021-01-01 RX ORDER — TALC
6 POWDER (GRAM) TOPICAL NIGHTLY PRN
Status: DISCONTINUED | OUTPATIENT
Start: 2021-01-01 | End: 2021-01-01 | Stop reason: HOSPADM

## 2021-01-01 RX ORDER — MUPIROCIN 20 MG/G
OINTMENT TOPICAL 2 TIMES DAILY
Status: COMPLETED | OUTPATIENT
Start: 2021-01-01 | End: 2021-01-01

## 2021-01-01 RX ORDER — SODIUM CHLORIDE 0.9 % (FLUSH) 0.9 %
10 SYRINGE (ML) INJECTION
Status: DISCONTINUED | OUTPATIENT
Start: 2021-01-01 | End: 2021-01-01 | Stop reason: HOSPADM

## 2021-01-01 RX ORDER — FUROSEMIDE 10 MG/ML
40 INJECTION INTRAMUSCULAR; INTRAVENOUS
Status: DISCONTINUED | OUTPATIENT
Start: 2021-01-01 | End: 2021-01-01

## 2021-01-01 RX ORDER — CALCIUM GLUCONATE 20 MG/ML
1 INJECTION, SOLUTION INTRAVENOUS
Status: COMPLETED | OUTPATIENT
Start: 2021-01-01 | End: 2021-01-01

## 2021-01-01 RX ORDER — VANCOMYCIN HYDROCHLORIDE 125 MG/1
125 CAPSULE ORAL EVERY 6 HOURS
Status: DISCONTINUED | OUTPATIENT
Start: 2021-01-01 | End: 2021-01-01

## 2021-01-01 RX ORDER — CEFUROXIME AXETIL 250 MG/1
500 TABLET ORAL DAILY
Status: COMPLETED | OUTPATIENT
Start: 2021-01-01 | End: 2021-01-01

## 2021-01-01 RX ORDER — HYDRALAZINE HYDROCHLORIDE 50 MG/1
50 TABLET, FILM COATED ORAL 3 TIMES DAILY PRN
Start: 2021-01-01 | End: 2021-01-01

## 2021-01-01 RX ORDER — IBUPROFEN 200 MG
24 TABLET ORAL
Status: DISCONTINUED | OUTPATIENT
Start: 2021-01-01 | End: 2021-01-01 | Stop reason: HOSPADM

## 2021-01-01 RX ORDER — MAGNESIUM SULFATE 1 G/100ML
1 INJECTION INTRAVENOUS ONCE
Status: COMPLETED | OUTPATIENT
Start: 2021-01-01 | End: 2021-01-01

## 2021-01-01 RX ORDER — LEVOFLOXACIN 5 MG/ML
750 INJECTION, SOLUTION INTRAVENOUS ONCE
Status: COMPLETED | OUTPATIENT
Start: 2021-01-01 | End: 2021-01-01

## 2021-01-01 RX ORDER — MORPHINE SULFATE 2 MG/ML
2 INJECTION, SOLUTION INTRAMUSCULAR; INTRAVENOUS
Status: COMPLETED | OUTPATIENT
Start: 2021-01-01 | End: 2021-01-01

## 2021-01-01 RX ORDER — SODIUM CHLORIDE 9 MG/ML
INJECTION, SOLUTION INTRAVENOUS CONTINUOUS
Status: DISCONTINUED | OUTPATIENT
Start: 2021-01-01 | End: 2021-01-01 | Stop reason: HOSPADM

## 2021-01-01 RX ORDER — MORPHINE SULFATE 4 MG/ML
4 INJECTION, SOLUTION INTRAMUSCULAR; INTRAVENOUS EVERY 4 HOURS PRN
Status: DISCONTINUED | OUTPATIENT
Start: 2021-01-01 | End: 2021-01-01 | Stop reason: HOSPADM

## 2021-01-01 RX ORDER — SODIUM CHLORIDE 9 MG/ML
INJECTION, SOLUTION INTRAVENOUS ONCE
Status: COMPLETED | OUTPATIENT
Start: 2021-01-01 | End: 2021-01-01

## 2021-01-01 RX ORDER — VANCOMYCIN HYDROCHLORIDE 250 MG/1
250 CAPSULE ORAL EVERY 6 HOURS
Start: 2021-01-01 | End: 2021-01-01

## 2021-01-01 RX ORDER — LORAZEPAM 2 MG/ML
1 INJECTION INTRAMUSCULAR EVERY 4 HOURS PRN
COMMUNITY

## 2021-01-01 RX ORDER — DEXTROSE MONOHYDRATE 50 MG/ML
INJECTION, SOLUTION INTRAVENOUS CONTINUOUS
Status: DISCONTINUED | OUTPATIENT
Start: 2021-01-01 | End: 2021-01-01

## 2021-01-01 RX ORDER — MICONAZOLE NITRATE 2 %
POWDER (GRAM) TOPICAL 2 TIMES DAILY
Qty: 60 G | Refills: 0 | Status: SHIPPED | OUTPATIENT
Start: 2021-01-01 | End: 2021-01-01

## 2021-01-01 RX ORDER — TRAMADOL HYDROCHLORIDE 50 MG/1
50 TABLET ORAL EVERY 8 HOURS PRN
Status: DISCONTINUED | OUTPATIENT
Start: 2021-01-01 | End: 2021-01-01 | Stop reason: HOSPADM

## 2021-01-01 RX ORDER — PANTOPRAZOLE SODIUM 40 MG/10ML
40 INJECTION, POWDER, LYOPHILIZED, FOR SOLUTION INTRAVENOUS 2 TIMES DAILY
Status: DISCONTINUED | OUTPATIENT
Start: 2021-01-01 | End: 2021-01-01 | Stop reason: HOSPADM

## 2021-01-01 RX ORDER — VANCOMYCIN HCL IN 5 % DEXTROSE 1G/250ML
1000 PLASTIC BAG, INJECTION (ML) INTRAVENOUS ONCE
Status: DISCONTINUED | OUTPATIENT
Start: 2021-01-01 | End: 2021-01-01

## 2021-01-01 RX ORDER — L. ACIDOPHILUS/L.BULGARICUS 100MM CELL
1 GRANULES IN PACKET (EA) ORAL 3 TIMES DAILY
Status: DISCONTINUED | OUTPATIENT
Start: 2021-01-01 | End: 2021-01-01 | Stop reason: HOSPADM

## 2021-01-01 RX ORDER — ONDANSETRON 4 MG/1
8 TABLET, ORALLY DISINTEGRATING ORAL EVERY 8 HOURS PRN
Status: DISCONTINUED | OUTPATIENT
Start: 2021-01-01 | End: 2021-01-01 | Stop reason: HOSPADM

## 2021-01-01 RX ORDER — GLUCAGON 1 MG
1 KIT INJECTION
Status: DISCONTINUED | OUTPATIENT
Start: 2021-01-01 | End: 2021-01-01

## 2021-01-01 RX ORDER — INSULIN ASPART 100 [IU]/ML
0-5 INJECTION, SOLUTION INTRAVENOUS; SUBCUTANEOUS
Status: DISCONTINUED | OUTPATIENT
Start: 2021-01-01 | End: 2021-01-01 | Stop reason: HOSPADM

## 2021-01-01 RX ORDER — ONDANSETRON 2 MG/ML
4 INJECTION INTRAMUSCULAR; INTRAVENOUS EVERY 4 HOURS PRN
Status: DISCONTINUED | OUTPATIENT
Start: 2021-01-01 | End: 2021-01-01 | Stop reason: HOSPADM

## 2021-01-01 RX ORDER — OXYCODONE AND ACETAMINOPHEN 10; 325 MG/1; MG/1
1 TABLET ORAL EVERY 4 HOURS PRN
Status: DISCONTINUED | OUTPATIENT
Start: 2021-01-01 | End: 2021-01-01 | Stop reason: HOSPADM

## 2021-01-01 RX ORDER — TALC
6 POWDER (GRAM) TOPICAL NIGHTLY PRN
Status: DISCONTINUED | OUTPATIENT
Start: 2021-01-01 | End: 2021-01-01

## 2021-01-01 RX ORDER — ACETAMINOPHEN 325 MG/1
650 TABLET ORAL
Status: COMPLETED | OUTPATIENT
Start: 2021-01-01 | End: 2021-01-01

## 2021-01-01 RX ORDER — METRONIDAZOLE 500 MG/100ML
500 INJECTION, SOLUTION INTRAVENOUS
Status: DISCONTINUED | OUTPATIENT
Start: 2021-01-01 | End: 2021-01-01 | Stop reason: HOSPADM

## 2021-01-01 RX ORDER — IPRATROPIUM BROMIDE AND ALBUTEROL SULFATE 2.5; .5 MG/3ML; MG/3ML
3 SOLUTION RESPIRATORY (INHALATION)
Status: DISPENSED | OUTPATIENT
Start: 2021-01-01 | End: 2021-01-01

## 2021-01-01 RX ORDER — MAGNESIUM SULFATE HEPTAHYDRATE 40 MG/ML
2 INJECTION, SOLUTION INTRAVENOUS ONCE
Status: COMPLETED | OUTPATIENT
Start: 2021-01-01 | End: 2021-01-01

## 2021-01-01 RX ORDER — HYDROCODONE BITARTRATE AND ACETAMINOPHEN 10; 325 MG/1; MG/1
1 TABLET ORAL EVERY 6 HOURS PRN
Status: DISCONTINUED | OUTPATIENT
Start: 2021-01-01 | End: 2021-01-01 | Stop reason: HOSPADM

## 2021-01-01 RX ORDER — CIPROFLOXACIN 2 MG/ML
200 INJECTION, SOLUTION INTRAVENOUS
Status: DISCONTINUED | OUTPATIENT
Start: 2021-01-01 | End: 2021-01-01

## 2021-01-01 RX ORDER — ACETAMINOPHEN 650 MG/1
650 SUPPOSITORY RECTAL EVERY 8 HOURS PRN
Status: DISCONTINUED | OUTPATIENT
Start: 2021-01-01 | End: 2021-01-01 | Stop reason: HOSPADM

## 2021-01-01 RX ORDER — TALC
6 POWDER (GRAM) TOPICAL NIGHTLY PRN
Refills: 0
Start: 2021-01-01

## 2021-01-01 RX ORDER — VANCOMYCIN HYDROCHLORIDE 250 MG/1
250 CAPSULE ORAL EVERY 6 HOURS
Status: DISCONTINUED | OUTPATIENT
Start: 2021-01-01 | End: 2021-01-01 | Stop reason: HOSPADM

## 2021-01-01 RX ORDER — HYDRALAZINE HYDROCHLORIDE 50 MG/1
50 TABLET, FILM COATED ORAL 3 TIMES DAILY PRN
Status: DISCONTINUED | OUTPATIENT
Start: 2021-01-01 | End: 2021-01-01 | Stop reason: HOSPADM

## 2021-01-01 RX ORDER — ONDANSETRON 4 MG/1
4 TABLET, ORALLY DISINTEGRATING ORAL EVERY 8 HOURS PRN
Status: DISCONTINUED | OUTPATIENT
Start: 2021-01-01 | End: 2021-01-01

## 2021-01-01 RX ORDER — DEXTROSE 50 % IN WATER (D50W) INTRAVENOUS SYRINGE
25
Status: COMPLETED | OUTPATIENT
Start: 2021-01-01 | End: 2021-01-01

## 2021-01-01 RX ORDER — HYDROCORTISONE 25 MG/G
CREAM TOPICAL 2 TIMES DAILY
Status: DISCONTINUED | OUTPATIENT
Start: 2021-01-01 | End: 2021-01-01 | Stop reason: HOSPADM

## 2021-01-01 RX ORDER — MORPHINE SULFATE 2 MG/ML
2 INJECTION, SOLUTION INTRAMUSCULAR; INTRAVENOUS EVERY 4 HOURS PRN
Status: DISCONTINUED | OUTPATIENT
Start: 2021-01-01 | End: 2021-01-01 | Stop reason: HOSPADM

## 2021-01-01 RX ORDER — SUCRALFATE 1 G/1
1 TABLET ORAL 4 TIMES DAILY
Status: DISCONTINUED | OUTPATIENT
Start: 2021-01-01 | End: 2021-01-01 | Stop reason: HOSPADM

## 2021-01-01 RX ORDER — DEXTROSE MONOHYDRATE 50 MG/ML
INJECTION, SOLUTION INTRAVENOUS CONTINUOUS
Status: DISCONTINUED | OUTPATIENT
Start: 2021-01-01 | End: 2021-01-01 | Stop reason: HOSPADM

## 2021-01-01 RX ORDER — ALUMINUM HYDROXIDE, MAGNESIUM HYDROXIDE, AND SIMETHICONE 2400; 240; 2400 MG/30ML; MG/30ML; MG/30ML
10 SUSPENSION ORAL EVERY 4 HOURS PRN
COMMUNITY

## 2021-01-01 RX ORDER — PANTOPRAZOLE SODIUM 40 MG/10ML
40 INJECTION, POWDER, LYOPHILIZED, FOR SOLUTION INTRAVENOUS
Status: COMPLETED | OUTPATIENT
Start: 2021-01-01 | End: 2021-01-01

## 2021-01-01 RX ORDER — PAROXETINE HYDROCHLORIDE 20 MG/1
20 TABLET, FILM COATED ORAL DAILY
Status: DISCONTINUED | OUTPATIENT
Start: 2021-01-01 | End: 2021-01-01 | Stop reason: HOSPADM

## 2021-01-01 RX ORDER — CLOPIDOGREL BISULFATE 75 MG/1
75 TABLET ORAL DAILY
Status: DISCONTINUED | OUTPATIENT
Start: 2021-01-01 | End: 2021-01-01

## 2021-01-01 RX ORDER — INSULIN ASPART 100 [IU]/ML
0-5 INJECTION, SOLUTION INTRAVENOUS; SUBCUTANEOUS 2 TIMES DAILY
Status: DISCONTINUED | OUTPATIENT
Start: 2021-01-01 | End: 2021-01-01

## 2021-01-01 RX ORDER — SODIUM CHLORIDE 9 MG/ML
INJECTION, SOLUTION INTRAVENOUS CONTINUOUS
Status: DISCONTINUED | OUTPATIENT
Start: 2021-01-01 | End: 2021-01-01

## 2021-01-01 RX ORDER — TIZANIDINE HYDROCHLORIDE 4 MG/1
4 CAPSULE, GELATIN COATED ORAL EVERY 8 HOURS PRN
COMMUNITY

## 2021-01-01 RX ORDER — HYDROCODONE BITARTRATE AND ACETAMINOPHEN 10; 325 MG/1; MG/1
1 TABLET ORAL EVERY 6 HOURS PRN
Refills: 0
Start: 2021-01-01 | End: 2021-01-01 | Stop reason: SDUPTHER

## 2021-01-01 RX ORDER — MEROPENEM AND SODIUM CHLORIDE 1 G/50ML
1 INJECTION, SOLUTION INTRAVENOUS
Status: DISCONTINUED | OUTPATIENT
Start: 2021-01-01 | End: 2021-01-01 | Stop reason: HOSPADM

## 2021-01-01 RX ORDER — ONDANSETRON 2 MG/ML
4 INJECTION INTRAMUSCULAR; INTRAVENOUS EVERY 8 HOURS PRN
Status: DISCONTINUED | OUTPATIENT
Start: 2021-01-01 | End: 2021-01-01

## 2021-01-01 RX ORDER — DOXYCYCLINE 100 MG/1
100 CAPSULE ORAL EVERY 12 HOURS
Qty: 20 CAPSULE | Refills: 0
Start: 2021-01-01 | End: 2021-01-01 | Stop reason: CLARIF

## 2021-01-01 RX ORDER — FUROSEMIDE 10 MG/ML
80 INJECTION INTRAMUSCULAR; INTRAVENOUS ONCE
Status: COMPLETED | OUTPATIENT
Start: 2021-01-01 | End: 2021-01-01

## 2021-01-01 RX ORDER — HYDRALAZINE HYDROCHLORIDE 20 MG/ML
10 INJECTION INTRAMUSCULAR; INTRAVENOUS EVERY 6 HOURS PRN
Status: DISCONTINUED | OUTPATIENT
Start: 2021-01-01 | End: 2021-01-01 | Stop reason: HOSPADM

## 2021-01-01 RX ORDER — NALOXONE HCL 0.4 MG/ML
0.4 VIAL (ML) INJECTION
Status: COMPLETED | OUTPATIENT
Start: 2021-01-01 | End: 2021-01-01

## 2021-01-01 RX ORDER — ENOXAPARIN SODIUM 100 MG/ML
30 INJECTION SUBCUTANEOUS EVERY 24 HOURS
Status: DISCONTINUED | OUTPATIENT
Start: 2021-01-01 | End: 2021-01-01

## 2021-01-01 RX ORDER — INSULIN ASPART 100 [IU]/ML
1-10 INJECTION, SOLUTION INTRAVENOUS; SUBCUTANEOUS EVERY 6 HOURS PRN
Status: DISCONTINUED | OUTPATIENT
Start: 2021-01-01 | End: 2021-01-01 | Stop reason: HOSPADM

## 2021-01-01 RX ORDER — MIRTAZAPINE 15 MG/1
15 TABLET, FILM COATED ORAL NIGHTLY
Qty: 30 TABLET | Refills: 11
Start: 2021-01-01 | End: 2021-01-01 | Stop reason: SDUPTHER

## 2021-01-01 RX ORDER — TRAMADOL HYDROCHLORIDE 50 MG/1
50 TABLET ORAL EVERY 8 HOURS PRN
Qty: 28 TABLET | Refills: 0 | Status: SHIPPED | OUTPATIENT
Start: 2021-01-01 | End: 2021-01-01

## 2021-01-01 RX ORDER — ACETAMINOPHEN 325 MG/1
325 TABLET ORAL EVERY 6 HOURS PRN
COMMUNITY

## 2021-01-01 RX ORDER — ONDANSETRON 2 MG/ML
4 INJECTION INTRAMUSCULAR; INTRAVENOUS EVERY 8 HOURS PRN
Status: DISCONTINUED | OUTPATIENT
Start: 2021-01-01 | End: 2021-01-01 | Stop reason: HOSPADM

## 2021-01-01 RX ADMIN — SUCRALFATE 1 G: 1 TABLET ORAL at 09:01

## 2021-01-01 RX ADMIN — MEROPENEM AND SODIUM CHLORIDE 1 G: 1 INJECTION, SOLUTION INTRAVENOUS at 10:12

## 2021-01-01 RX ADMIN — HYDROCODONE BITARTRATE AND ACETAMINOPHEN 1 TABLET: 10; 325 TABLET ORAL at 10:02

## 2021-01-01 RX ADMIN — MEROPENEM 500 MG: 500 INJECTION, POWDER, FOR SOLUTION INTRAVENOUS at 02:06

## 2021-01-01 RX ADMIN — VANCOMYCIN HYDROCHLORIDE 125 MG: 125 CAPSULE ORAL at 11:01

## 2021-01-01 RX ADMIN — ONDANSETRON 8 MG: 4 TABLET, ORALLY DISINTEGRATING ORAL at 01:01

## 2021-01-01 RX ADMIN — PANTOPRAZOLE SODIUM 40 MG: 40 INJECTION, POWDER, FOR SOLUTION INTRAVENOUS at 06:02

## 2021-01-01 RX ADMIN — MEROPENEM 1 G: 1 INJECTION, POWDER, FOR SOLUTION INTRAVENOUS at 03:06

## 2021-01-01 RX ADMIN — METRONIDAZOLE 500 MG: 500 INJECTION, SOLUTION INTRAVENOUS at 01:02

## 2021-01-01 RX ADMIN — ONDANSETRON 8 MG: 4 TABLET, ORALLY DISINTEGRATING ORAL at 08:01

## 2021-01-01 RX ADMIN — VANCOMYCIN HYDROCHLORIDE 125 MG: 125 CAPSULE ORAL at 05:01

## 2021-01-01 RX ADMIN — POTASSIUM CHLORIDE 10 MEQ: 7.46 INJECTION, SOLUTION INTRAVENOUS at 11:02

## 2021-01-01 RX ADMIN — DEXTROSE MONOHYDRATE 12.5 G: 25 INJECTION, SOLUTION INTRAVENOUS at 05:02

## 2021-01-01 RX ADMIN — OXYCODONE HYDROCHLORIDE AND ACETAMINOPHEN 1 TABLET: 10; 325 TABLET ORAL at 03:01

## 2021-01-01 RX ADMIN — HYDRALAZINE HYDROCHLORIDE 50 MG: 50 TABLET, FILM COATED ORAL at 06:02

## 2021-01-01 RX ADMIN — PANTOPRAZOLE SODIUM 40 MG: 40 INJECTION, POWDER, FOR SOLUTION INTRAVENOUS at 08:02

## 2021-01-01 RX ADMIN — GABAPENTIN 100 MG: 100 CAPSULE ORAL at 08:01

## 2021-01-01 RX ADMIN — LIDOCAINE HYDROCHLORIDE 10 ML: 20 SOLUTION ORAL; TOPICAL at 04:01

## 2021-01-01 RX ADMIN — SODIUM CHLORIDE: 0.9 INJECTION, SOLUTION INTRAVENOUS at 01:12

## 2021-01-01 RX ADMIN — OXYCODONE AND ACETAMINOPHEN 1 TABLET: 325; 10 TABLET ORAL at 08:01

## 2021-01-01 RX ADMIN — VANCOMYCIN HYDROCHLORIDE 1750 MG: 500 INJECTION, POWDER, LYOPHILIZED, FOR SOLUTION INTRAVENOUS at 02:12

## 2021-01-01 RX ADMIN — MEROPENEM AND SODIUM CHLORIDE 1 G: 1 INJECTION, SOLUTION INTRAVENOUS at 11:12

## 2021-01-01 RX ADMIN — LACTOBACILLUS ACIDOPHILUS / LACTOBACILLUS BULGARICUS 1 EACH: 100 MILLION CFU STRENGTH GRANULES at 09:02

## 2021-01-01 RX ADMIN — MUPIROCIN: 20 OINTMENT TOPICAL at 08:02

## 2021-01-01 RX ADMIN — MUPIROCIN: 20 OINTMENT TOPICAL at 09:12

## 2021-01-01 RX ADMIN — SUCRALFATE 1 G: 1 TABLET ORAL at 08:06

## 2021-01-01 RX ADMIN — PANTOPRAZOLE SODIUM 40 MG: 40 TABLET, DELAYED RELEASE ORAL at 09:01

## 2021-01-01 RX ADMIN — SUCRALFATE 1 G: 1 TABLET ORAL at 04:01

## 2021-01-01 RX ADMIN — VANCOMYCIN HYDROCHLORIDE 250 MG: 250 CAPSULE ORAL at 11:02

## 2021-01-01 RX ADMIN — CARVEDILOL 3.12 MG: 3.12 TABLET, FILM COATED ORAL at 08:01

## 2021-01-01 RX ADMIN — SUCRALFATE 1 G: 1 TABLET ORAL at 03:06

## 2021-01-01 RX ADMIN — SUCRALFATE 1 G: 1 TABLET ORAL at 04:06

## 2021-01-01 RX ADMIN — IPRATROPIUM BROMIDE AND ALBUTEROL SULFATE 3 ML: .5; 3 SOLUTION RESPIRATORY (INHALATION) at 11:02

## 2021-01-01 RX ADMIN — VANCOMYCIN HYDROCHLORIDE 250 MG: 250 CAPSULE ORAL at 05:02

## 2021-01-01 RX ADMIN — ACETAMINOPHEN 650 MG: 325 TABLET ORAL at 12:01

## 2021-01-01 RX ADMIN — ANTI-FUNGAL POWDER MICONAZOLE NITRATE TALC FREE: 1.42 POWDER TOPICAL at 08:01

## 2021-01-01 RX ADMIN — ONDANSETRON 4 MG: 2 INJECTION INTRAMUSCULAR; INTRAVENOUS at 04:02

## 2021-01-01 RX ADMIN — SODIUM BICARBONATE: 84 INJECTION, SOLUTION INTRAVENOUS at 02:02

## 2021-01-01 RX ADMIN — HYDROCODONE BITARTRATE AND ACETAMINOPHEN 1 TABLET: 10; 325 TABLET ORAL at 09:02

## 2021-01-01 RX ADMIN — MONTELUKAST 10 MG: 10 TABLET, FILM COATED ORAL at 08:01

## 2021-01-01 RX ADMIN — TRAMADOL HYDROCHLORIDE 50 MG: 50 TABLET ORAL at 07:01

## 2021-01-01 RX ADMIN — ANTI-FUNGAL POWDER MICONAZOLE NITRATE TALC FREE: 1.42 POWDER TOPICAL at 09:01

## 2021-01-01 RX ADMIN — PANTOPRAZOLE SODIUM 40 MG: 40 TABLET, DELAYED RELEASE ORAL at 08:01

## 2021-01-01 RX ADMIN — HYDROCODONE BITARTRATE AND ACETAMINOPHEN 1 TABLET: 10; 325 TABLET ORAL at 05:02

## 2021-01-01 RX ADMIN — VANCOMYCIN HYDROCHLORIDE 500 MG: 500 INJECTION, POWDER, LYOPHILIZED, FOR SOLUTION INTRAVENOUS at 03:02

## 2021-01-01 RX ADMIN — MORPHINE SULFATE 4 MG: 4 INJECTION, SOLUTION INTRAMUSCULAR; INTRAVENOUS at 02:06

## 2021-01-01 RX ADMIN — TRAMADOL HYDROCHLORIDE 50 MG: 50 TABLET ORAL at 05:01

## 2021-01-01 RX ADMIN — LIDOCAINE HYDROCHLORIDE 10 ML: 20 SOLUTION ORAL; TOPICAL at 12:01

## 2021-01-01 RX ADMIN — TRAMADOL HYDROCHLORIDE 50 MG: 50 TABLET ORAL at 04:01

## 2021-01-01 RX ADMIN — SUCRALFATE 1 G: 1 TABLET ORAL at 08:01

## 2021-01-01 RX ADMIN — VANCOMYCIN HYDROCHLORIDE 250 MG: 250 CAPSULE ORAL at 01:02

## 2021-01-01 RX ADMIN — MORPHINE SULFATE 2 MG: 2 INJECTION, SOLUTION INTRAMUSCULAR; INTRAVENOUS at 04:06

## 2021-01-01 RX ADMIN — GABAPENTIN 100 MG: 100 CAPSULE ORAL at 09:06

## 2021-01-01 RX ADMIN — FUROSEMIDE 40 MG: 10 INJECTION, SOLUTION INTRAMUSCULAR; INTRAVENOUS at 10:12

## 2021-01-01 RX ADMIN — DEXTROSE: 5 SOLUTION INTRAVENOUS at 09:06

## 2021-01-01 RX ADMIN — MEROPENEM AND SODIUM CHLORIDE 500 MG: 500 INJECTION, SOLUTION INTRAVENOUS at 11:12

## 2021-01-01 RX ADMIN — TRAMADOL HYDROCHLORIDE 50 MG: 50 TABLET ORAL at 08:01

## 2021-01-01 RX ADMIN — Medication 10 ML: at 08:02

## 2021-01-01 RX ADMIN — VANCOMYCIN HYDROCHLORIDE 1500 MG: 1.5 INJECTION, POWDER, LYOPHILIZED, FOR SOLUTION INTRAVENOUS at 11:02

## 2021-01-01 RX ADMIN — MAGNESIUM SULFATE 1 G: 1 INJECTION INTRAVENOUS at 07:02

## 2021-01-01 RX ADMIN — CIPROFLOXACIN 200 MG: 2 INJECTION, SOLUTION INTRAVENOUS at 07:02

## 2021-01-01 RX ADMIN — SODIUM BICARBONATE: 84 INJECTION, SOLUTION INTRAVENOUS at 04:02

## 2021-01-01 RX ADMIN — PANTOPRAZOLE SODIUM 40 MG: 40 TABLET, DELAYED RELEASE ORAL at 08:06

## 2021-01-01 RX ADMIN — PAROXETINE HYDROCHLORIDE 20 MG: 20 TABLET, FILM COATED ORAL at 09:06

## 2021-01-01 RX ADMIN — HYDROCODONE BITARTRATE AND ACETAMINOPHEN 1 TABLET: 10; 325 TABLET ORAL at 12:02

## 2021-01-01 RX ADMIN — DEXTROSE 50 ML/HR: 5 SOLUTION INTRAVENOUS at 10:06

## 2021-01-01 RX ADMIN — DEXTROSE MONOHYDRATE 12.5 G: 25 INJECTION, SOLUTION INTRAVENOUS at 11:02

## 2021-01-01 RX ADMIN — HYDROCORTISONE: 25 CREAM TOPICAL at 08:01

## 2021-01-01 RX ADMIN — OXYCODONE AND ACETAMINOPHEN 1 TABLET: 325; 10 TABLET ORAL at 10:01

## 2021-01-01 RX ADMIN — METRONIDAZOLE 500 MG: 500 INJECTION, SOLUTION INTRAVENOUS at 06:02

## 2021-01-01 RX ADMIN — DEXTROSE: 5 SOLUTION INTRAVENOUS at 09:02

## 2021-01-01 RX ADMIN — GABAPENTIN 100 MG: 100 CAPSULE ORAL at 08:06

## 2021-01-01 RX ADMIN — METRONIDAZOLE 500 MG: 500 INJECTION, SOLUTION INTRAVENOUS at 05:02

## 2021-01-01 RX ADMIN — OXYCODONE AND ACETAMINOPHEN 1 TABLET: 325; 10 TABLET ORAL at 09:01

## 2021-01-01 RX ADMIN — MORPHINE SULFATE 2 MG: 2 INJECTION, SOLUTION INTRAMUSCULAR; INTRAVENOUS at 06:06

## 2021-01-01 RX ADMIN — SODIUM CHLORIDE: 0.9 INJECTION, SOLUTION INTRAVENOUS at 09:12

## 2021-01-01 RX ADMIN — MUPIROCIN: 20 OINTMENT TOPICAL at 11:12

## 2021-01-01 RX ADMIN — Medication 6 MG: at 08:01

## 2021-01-01 RX ADMIN — LACTOBACILLUS ACIDOPHILUS / LACTOBACILLUS BULGARICUS 1 EACH: 100 MILLION CFU STRENGTH GRANULES at 08:02

## 2021-01-01 RX ADMIN — ONDANSETRON 4 MG: 2 INJECTION INTRAMUSCULAR; INTRAVENOUS at 08:02

## 2021-01-01 RX ADMIN — ONDANSETRON 4 MG: 2 INJECTION INTRAMUSCULAR; INTRAVENOUS at 11:02

## 2021-01-01 RX ADMIN — LACTOBACILLUS ACIDOPHILUS / LACTOBACILLUS BULGARICUS 1 EACH: 100 MILLION CFU STRENGTH GRANULES at 03:02

## 2021-01-01 RX ADMIN — POTASSIUM BICARBONATE 25 MEQ: 978 TABLET, EFFERVESCENT ORAL at 08:06

## 2021-01-01 RX ADMIN — METRONIDAZOLE 500 MG: 500 INJECTION, SOLUTION INTRAVENOUS at 10:02

## 2021-01-01 RX ADMIN — MAGNESIUM SULFATE IN WATER 2 G: 40 INJECTION, SOLUTION INTRAVENOUS at 01:02

## 2021-01-01 RX ADMIN — TUBERCULIN PURIFIED PROTEIN DERIVATIVE 5 UNITS: 5 INJECTION, SOLUTION INTRADERMAL at 04:02

## 2021-01-01 RX ADMIN — MORPHINE SULFATE 2 MG: 2 INJECTION, SOLUTION INTRAMUSCULAR; INTRAVENOUS at 02:02

## 2021-01-01 RX ADMIN — ONDANSETRON 8 MG: 4 TABLET, ORALLY DISINTEGRATING ORAL at 04:01

## 2021-01-01 RX ADMIN — SODIUM POLYSTYRENE SULFONATE 30 G: 15 SUSPENSION ORAL; RECTAL at 12:02

## 2021-01-01 RX ADMIN — ONDANSETRON 4 MG: 2 INJECTION INTRAMUSCULAR; INTRAVENOUS at 02:02

## 2021-01-01 RX ADMIN — LIDOCAINE HYDROCHLORIDE 10 ML: 20 SOLUTION ORAL; TOPICAL at 08:01

## 2021-01-01 RX ADMIN — VANCOMYCIN HYDROCHLORIDE 250 MG: 250 CAPSULE ORAL at 06:02

## 2021-01-01 RX ADMIN — OXYCODONE HYDROCHLORIDE AND ACETAMINOPHEN 1 TABLET: 10; 325 TABLET ORAL at 08:01

## 2021-01-01 RX ADMIN — POTASSIUM BICARBONATE 20 MEQ: 391 TABLET, EFFERVESCENT ORAL at 09:02

## 2021-01-01 RX ADMIN — MORPHINE SULFATE 2 MG: 2 INJECTION, SOLUTION INTRAMUSCULAR; INTRAVENOUS at 12:06

## 2021-01-01 RX ADMIN — PANTOPRAZOLE SODIUM 40 MG: 40 INJECTION, POWDER, FOR SOLUTION INTRAVENOUS at 09:02

## 2021-01-01 RX ADMIN — OXYCODONE AND ACETAMINOPHEN 1 TABLET: 325; 10 TABLET ORAL at 12:01

## 2021-01-01 RX ADMIN — ONDANSETRON 8 MG: 4 TABLET, ORALLY DISINTEGRATING ORAL at 03:01

## 2021-01-01 RX ADMIN — LIDOCAINE HYDROCHLORIDE 3.33 ML: 20 SOLUTION ORAL; TOPICAL at 08:01

## 2021-01-01 RX ADMIN — Medication 6 MG: at 09:01

## 2021-01-01 RX ADMIN — HYDROCODONE BITARTRATE AND ACETAMINOPHEN 1 TABLET: 10; 325 TABLET ORAL at 02:02

## 2021-01-01 RX ADMIN — SODIUM CHLORIDE, SODIUM LACTATE, POTASSIUM CHLORIDE, AND CALCIUM CHLORIDE 1000 ML: .6; .31; .03; .02 INJECTION, SOLUTION INTRAVENOUS at 09:12

## 2021-01-01 RX ADMIN — VANCOMYCIN HYDROCHLORIDE 125 MG: KIT at 05:01

## 2021-01-01 RX ADMIN — POTASSIUM CHLORIDE 10 MEQ: 7.46 INJECTION, SOLUTION INTRAVENOUS at 10:02

## 2021-01-01 RX ADMIN — POTASSIUM CHLORIDE 10 MEQ: 10 INJECTION, SOLUTION INTRAVENOUS at 03:12

## 2021-01-01 RX ADMIN — GENTAMICIN SULFATE 139.2 MG: 40 INJECTION, SOLUTION INTRAMUSCULAR; INTRAVENOUS at 04:02

## 2021-01-01 RX ADMIN — SUCRALFATE 1 G: 1 TABLET ORAL at 12:01

## 2021-01-01 RX ADMIN — LIDOCAINE HYDROCHLORIDE: 20 SOLUTION ORAL; TOPICAL at 08:01

## 2021-01-01 RX ADMIN — MORPHINE SULFATE 4 MG: 4 INJECTION, SOLUTION INTRAMUSCULAR; INTRAVENOUS at 10:06

## 2021-01-01 RX ADMIN — ONDANSETRON 8 MG: 4 TABLET, ORALLY DISINTEGRATING ORAL at 07:01

## 2021-01-01 RX ADMIN — POTASSIUM CHLORIDE 10 MEQ: 10 INJECTION, SOLUTION INTRAVENOUS at 10:12

## 2021-01-01 RX ADMIN — ONDANSETRON 4 MG: 4 TABLET, ORALLY DISINTEGRATING ORAL at 07:01

## 2021-01-01 RX ADMIN — SUCRALFATE 1 G: 1 TABLET ORAL at 09:06

## 2021-01-01 RX ADMIN — MORPHINE SULFATE 2 MG: 2 INJECTION, SOLUTION INTRAMUSCULAR; INTRAVENOUS at 08:06

## 2021-01-01 RX ADMIN — FUROSEMIDE 80 MG: 10 INJECTION, SOLUTION INTRAVENOUS at 08:12

## 2021-01-01 RX ADMIN — GABAPENTIN 100 MG: 100 CAPSULE ORAL at 10:06

## 2021-01-01 RX ADMIN — SUCRALFATE 1 G: 1 TABLET ORAL at 01:06

## 2021-01-01 RX ADMIN — SODIUM BICARBONATE: 84 INJECTION, SOLUTION INTRAVENOUS at 11:02

## 2021-01-01 RX ADMIN — CEFUROXIME AXETIL 500 MG: 250 TABLET, FILM COATED ORAL at 02:12

## 2021-01-01 RX ADMIN — CALCIUM GLUCONATE 1000 MG: 20 INJECTION, SOLUTION INTRAVENOUS at 11:02

## 2021-01-01 RX ADMIN — GABAPENTIN 100 MG: 100 CAPSULE ORAL at 09:01

## 2021-01-01 RX ADMIN — METRONIDAZOLE 500 MG: 500 INJECTION, SOLUTION INTRAVENOUS at 09:02

## 2021-01-01 RX ADMIN — HYDROCODONE BITARTRATE AND ACETAMINOPHEN 1 TABLET: 10; 325 TABLET ORAL at 08:02

## 2021-01-01 RX ADMIN — HYDROCODONE BITARTRATE AND ACETAMINOPHEN 1 TABLET: 10; 325 TABLET ORAL at 04:02

## 2021-01-01 RX ADMIN — CEFUROXIME AXETIL 500 MG: 250 TABLET, FILM COATED ORAL at 08:12

## 2021-01-01 RX ADMIN — NALOXONE HYDROCHLORIDE 0.4 MG: 0.4 INJECTION, SOLUTION INTRAMUSCULAR; INTRAVENOUS; SUBCUTANEOUS at 09:06

## 2021-01-01 RX ADMIN — CARVEDILOL 3.12 MG: 3.12 TABLET, FILM COATED ORAL at 09:01

## 2021-01-01 RX ADMIN — FUROSEMIDE 80 MG: 10 INJECTION, SOLUTION INTRAVENOUS at 09:12

## 2021-01-01 RX ADMIN — PANTOPRAZOLE SODIUM 40 MG: 40 TABLET, DELAYED RELEASE ORAL at 03:01

## 2021-01-01 RX ADMIN — VANCOMYCIN HYDROCHLORIDE 250 MG: 250 CAPSULE ORAL at 07:02

## 2021-01-01 RX ADMIN — MEROPENEM 500 MG: 500 INJECTION, POWDER, FOR SOLUTION INTRAVENOUS at 03:06

## 2021-01-01 RX ADMIN — SUCRALFATE 1 G: 1 TABLET ORAL at 10:06

## 2021-01-01 RX ADMIN — TRAMADOL HYDROCHLORIDE 50 MG: 50 TABLET ORAL at 10:01

## 2021-01-01 RX ADMIN — HYDROCORTISONE: 25 CREAM TOPICAL at 09:01

## 2021-01-01 RX ADMIN — Medication 16 G: at 03:06

## 2021-01-01 RX ADMIN — POTASSIUM CHLORIDE 10 MEQ: 10 INJECTION, SOLUTION INTRAVENOUS at 12:12

## 2021-01-01 RX ADMIN — SODIUM CHLORIDE: 0.9 INJECTION, SOLUTION INTRAVENOUS at 10:02

## 2021-01-01 RX ADMIN — SODIUM CHLORIDE: 0.9 INJECTION, SOLUTION INTRAVENOUS at 05:12

## 2021-01-01 RX ADMIN — PANTOPRAZOLE SODIUM 40 MG: 40 TABLET, DELAYED RELEASE ORAL at 10:06

## 2021-01-01 RX ADMIN — POTASSIUM BICARBONATE 20 MEQ: 391 TABLET, EFFERVESCENT ORAL at 08:02

## 2021-01-01 RX ADMIN — TRAMADOL HYDROCHLORIDE 50 MG: 50 TABLET ORAL at 12:01

## 2021-01-01 RX ADMIN — POTASSIUM CHLORIDE 10 MEQ: 7.46 INJECTION, SOLUTION INTRAVENOUS at 09:02

## 2021-01-01 RX ADMIN — METRONIDAZOLE 500 MG: 500 INJECTION, SOLUTION INTRAVENOUS at 02:02

## 2021-01-01 RX ADMIN — Medication 6 MG: at 09:12

## 2021-01-01 RX ADMIN — SODIUM CHLORIDE: 0.9 INJECTION, SOLUTION INTRAVENOUS at 01:02

## 2021-01-01 RX ADMIN — HYDRALAZINE HYDROCHLORIDE 10 MG: 20 INJECTION, SOLUTION INTRAMUSCULAR; INTRAVENOUS at 09:12

## 2021-01-01 RX ADMIN — VANCOMYCIN HYDROCHLORIDE 250 MG: 250 CAPSULE ORAL at 12:02

## 2021-01-01 RX ADMIN — SODIUM CHLORIDE 1000 ML: 0.9 INJECTION, SOLUTION INTRAVENOUS at 09:06

## 2021-01-01 RX ADMIN — METRONIDAZOLE 500 MG: 500 INJECTION, SOLUTION INTRAVENOUS at 02:01

## 2021-01-01 RX ADMIN — OXYCODONE HYDROCHLORIDE AND ACETAMINOPHEN 1 TABLET: 10; 325 TABLET ORAL at 12:01

## 2021-01-01 RX ADMIN — ONDANSETRON 8 MG: 4 TABLET, ORALLY DISINTEGRATING ORAL at 05:01

## 2021-01-01 RX ADMIN — ACETAMINOPHEN 650 MG: 325 TABLET ORAL at 03:02

## 2021-01-01 RX ADMIN — LIDOCAINE HYDROCHLORIDE 10 ML: 20 SOLUTION ORAL; TOPICAL at 09:01

## 2021-01-01 RX ADMIN — SODIUM CHLORIDE 1000 ML: 0.9 INJECTION, SOLUTION INTRAVENOUS at 10:02

## 2021-01-01 RX ADMIN — OXYCODONE AND ACETAMINOPHEN 1 TABLET: 325; 10 TABLET ORAL at 11:01

## 2021-01-01 RX ADMIN — TUBERCULIN PURIFIED PROTEIN DERIVATIVE 5 UNITS: 5 INJECTION INTRADERMAL at 04:12

## 2021-01-01 RX ADMIN — CEFEPIME HYDROCHLORIDE 2 G: 2 INJECTION, SOLUTION INTRAVENOUS at 12:12

## 2021-01-01 RX ADMIN — SODIUM CHLORIDE: 0.9 INJECTION, SOLUTION INTRAVENOUS at 07:12

## 2021-01-01 RX ADMIN — Medication 10 ML: at 11:02

## 2021-01-01 RX ADMIN — VANCOMYCIN HYDROCHLORIDE 125 MG: KIT at 12:01

## 2021-01-01 RX ADMIN — HYDROCODONE BITARTRATE AND ACETAMINOPHEN 1 TABLET: 10; 325 TABLET ORAL at 06:02

## 2021-01-01 RX ADMIN — Medication 6 MG: at 07:01

## 2021-01-01 RX ADMIN — LIDOCAINE HYDROCHLORIDE 10 ML: 20 SOLUTION ORAL; TOPICAL at 01:01

## 2021-01-01 RX ADMIN — POTASSIUM BICARBONATE 20 MEQ: 391 TABLET, EFFERVESCENT ORAL at 12:01

## 2021-01-01 RX ADMIN — OXYCODONE AND ACETAMINOPHEN 1 TABLET: 325; 10 TABLET ORAL at 01:01

## 2021-01-01 RX ADMIN — VANCOMYCIN HYDROCHLORIDE 125 MG: 125 CAPSULE ORAL at 12:01

## 2021-01-01 RX ADMIN — ONDANSETRON 4 MG: 4 TABLET, ORALLY DISINTEGRATING ORAL at 11:01

## 2021-01-01 RX ADMIN — HYDRALAZINE HYDROCHLORIDE 10 MG: 20 INJECTION, SOLUTION INTRAMUSCULAR; INTRAVENOUS at 08:12

## 2021-01-01 RX ADMIN — TRAMADOL HYDROCHLORIDE 50 MG: 50 TABLET ORAL at 02:01

## 2021-01-01 RX ADMIN — PANTOPRAZOLE SODIUM 40 MG: 40 INJECTION, POWDER, FOR SOLUTION INTRAVENOUS at 10:02

## 2021-01-01 RX ADMIN — POTASSIUM BICARBONATE 25 MEQ: 978 TABLET, EFFERVESCENT ORAL at 09:06

## 2021-01-01 RX ADMIN — CIPROFLOXACIN 200 MG: 2 INJECTION, SOLUTION INTRAVENOUS at 08:02

## 2021-01-01 RX ADMIN — ONDANSETRON 4 MG: 2 INJECTION INTRAMUSCULAR; INTRAVENOUS at 12:02

## 2021-01-01 RX ADMIN — SODIUM CHLORIDE 500 ML: 0.9 INJECTION, SOLUTION INTRAVENOUS at 12:01

## 2021-01-01 RX ADMIN — PAROXETINE HYDROCHLORIDE 20 MG: 20 TABLET, FILM COATED ORAL at 08:06

## 2021-01-01 RX ADMIN — OXYCODONE HYDROCHLORIDE AND ACETAMINOPHEN 1 TABLET: 10; 325 TABLET ORAL at 05:01

## 2021-01-01 RX ADMIN — PANTOPRAZOLE SODIUM 40 MG: 40 TABLET, DELAYED RELEASE ORAL at 09:06

## 2021-01-01 RX ADMIN — ONDANSETRON 4 MG: 2 INJECTION INTRAMUSCULAR; INTRAVENOUS at 03:02

## 2021-01-01 RX ADMIN — ONDANSETRON 4 MG: 2 INJECTION INTRAMUSCULAR; INTRAVENOUS at 07:02

## 2021-01-01 RX ADMIN — ONDANSETRON 8 MG: 4 TABLET, ORALLY DISINTEGRATING ORAL at 09:01

## 2021-01-01 RX ADMIN — SUCRALFATE 1 G: 1 TABLET ORAL at 05:01

## 2021-01-01 RX ADMIN — HYDRALAZINE HYDROCHLORIDE 10 MG: 20 INJECTION, SOLUTION INTRAMUSCULAR; INTRAVENOUS at 07:12

## 2021-01-01 RX ADMIN — VANCOMYCIN HYDROCHLORIDE 1250 MG: 1.25 INJECTION, POWDER, LYOPHILIZED, FOR SOLUTION INTRAVENOUS at 09:12

## 2021-01-01 RX ADMIN — ACETAMINOPHEN 650 MG: 325 TABLET ORAL at 09:02

## 2021-01-01 RX ADMIN — LACTOBACILLUS ACIDOPHILUS / LACTOBACILLUS BULGARICUS 1 EACH: 100 MILLION CFU STRENGTH GRANULES at 02:02

## 2021-01-01 RX ADMIN — ONDANSETRON 4 MG: 4 TABLET, ORALLY DISINTEGRATING ORAL at 08:01

## 2021-01-01 RX ADMIN — MEROPENEM 1 G: 1 INJECTION, POWDER, FOR SOLUTION INTRAVENOUS at 08:06

## 2021-01-01 RX ADMIN — FUROSEMIDE 80 MG: 10 INJECTION, SOLUTION INTRAVENOUS at 03:02

## 2021-01-01 RX ADMIN — POTASSIUM CHLORIDE 10 MEQ: 7.46 INJECTION, SOLUTION INTRAVENOUS at 03:02

## 2021-01-01 RX ADMIN — METHYLPREDNISOLONE SODIUM SUCCINATE 125 MG: 125 INJECTION, POWDER, FOR SOLUTION INTRAMUSCULAR; INTRAVENOUS at 11:12

## 2021-01-01 RX ADMIN — SODIUM CHLORIDE: 0.9 INJECTION, SOLUTION INTRAVENOUS at 11:12

## 2021-01-01 RX ADMIN — SUCRALFATE 1 G: 1 TABLET ORAL at 12:06

## 2021-01-01 RX ADMIN — MUPIROCIN: 20 OINTMENT TOPICAL at 08:12

## 2021-01-01 RX ADMIN — DEXTROSE: 5 SOLUTION INTRAVENOUS at 08:02

## 2021-01-01 RX ADMIN — ACETAMINOPHEN 650 MG: 325 TABLET ORAL at 08:12

## 2021-01-01 RX ADMIN — ACETAMINOPHEN 650 MG: 325 TABLET ORAL at 06:12

## 2021-01-01 RX ADMIN — FUROSEMIDE 80 MG: 10 INJECTION, SOLUTION INTRAVENOUS at 11:12

## 2021-01-01 RX ADMIN — POTASSIUM CHLORIDE 10 MEQ: 7.46 INJECTION, SOLUTION INTRAVENOUS at 01:02

## 2021-01-01 RX ADMIN — CALCIUM CHLORIDE: 100 INJECTION INTRAVENOUS; INTRAVENTRICULAR at 07:02

## 2021-01-01 RX ADMIN — HYDROCODONE BITARTRATE AND ACETAMINOPHEN 1 TABLET: 10; 325 TABLET ORAL at 03:02

## 2021-01-01 RX ADMIN — MUPIROCIN: 20 OINTMENT TOPICAL at 09:02

## 2021-01-01 RX ADMIN — CLOPIDOGREL 75 MG: 75 TABLET, FILM COATED ORAL at 01:06

## 2021-01-01 RX ADMIN — VANCOMYCIN HYDROCHLORIDE 1500 MG: 1.5 INJECTION, POWDER, LYOPHILIZED, FOR SOLUTION INTRAVENOUS at 09:12

## 2021-01-01 RX ADMIN — SODIUM CHLORIDE: 0.9 INJECTION, SOLUTION INTRAVENOUS at 09:02

## 2021-01-01 RX ADMIN — INSULIN HUMAN 10 UNITS: 100 INJECTION, SOLUTION PARENTERAL at 11:02

## 2021-01-01 RX ADMIN — Medication 6 MG: at 08:12

## 2021-01-01 RX ADMIN — CALCIUM GLUCONATE 1000 MG: 20 INJECTION, SOLUTION INTRAVENOUS at 01:02

## 2021-01-01 RX ADMIN — CIPROFLOXACIN 200 MG: 2 INJECTION, SOLUTION INTRAVENOUS at 06:02

## 2021-01-01 RX ADMIN — DEXTROSE MONOHYDRATE 25 G: 25 INJECTION, SOLUTION INTRAVENOUS at 11:02

## 2021-01-01 RX ADMIN — MEROPENEM 1 G: 1 INJECTION, POWDER, FOR SOLUTION INTRAVENOUS at 01:04

## 2021-01-01 RX ADMIN — SODIUM CHLORIDE 500 ML/HR: 0.9 INJECTION, SOLUTION INTRAVENOUS at 09:02

## 2021-01-01 RX ADMIN — HYDROCODONE BITARTRATE AND ACETAMINOPHEN 1 TABLET: 10; 325 TABLET ORAL at 01:02

## 2021-01-01 RX ADMIN — MEROPENEM AND SODIUM CHLORIDE 500 MG: 500 INJECTION, SOLUTION INTRAVENOUS at 12:12

## 2021-01-01 RX ADMIN — SODIUM CHLORIDE, SODIUM LACTATE, POTASSIUM CHLORIDE, AND CALCIUM CHLORIDE 1000 ML: .6; .31; .03; .02 INJECTION, SOLUTION INTRAVENOUS at 12:12

## 2021-01-01 RX ADMIN — MORPHINE SULFATE 2 MG: 2 INJECTION, SOLUTION INTRAMUSCULAR; INTRAVENOUS at 09:06

## 2021-01-01 RX ADMIN — MORPHINE SULFATE 2 MG: 2 INJECTION, SOLUTION INTRAMUSCULAR; INTRAVENOUS at 01:06

## 2021-01-01 RX ADMIN — ACETAMINOPHEN 650 MG: 325 TABLET ORAL at 12:02

## 2021-01-01 RX ADMIN — ENOXAPARIN SODIUM 30 MG: 30 INJECTION SUBCUTANEOUS at 05:06

## 2021-01-01 RX ADMIN — ONDANSETRON 4 MG: 2 INJECTION INTRAMUSCULAR; INTRAVENOUS at 01:02

## 2021-01-01 RX ADMIN — CALCIUM GLUCONATE 1000 MG: 20 INJECTION, SOLUTION INTRAVENOUS at 10:02

## 2021-01-01 RX ADMIN — LEVOFLOXACIN 750 MG: 750 INJECTION, SOLUTION INTRAVENOUS at 12:12

## 2021-01-01 RX ADMIN — SODIUM CHLORIDE 1000 ML: 0.9 INJECTION, SOLUTION INTRAVENOUS at 01:02

## 2021-01-01 RX ADMIN — HYDROCODONE BITARTRATE AND ACETAMINOPHEN 1 TABLET: 10; 325 TABLET ORAL at 11:02

## 2021-01-01 RX ADMIN — ONDANSETRON 4 MG: 2 INJECTION INTRAMUSCULAR; INTRAVENOUS at 09:02

## 2021-01-01 RX ADMIN — SODIUM CHLORIDE: 0.9 INJECTION, SOLUTION INTRAVENOUS at 08:02

## 2021-01-01 RX ADMIN — HYDRALAZINE HYDROCHLORIDE 10 MG: 20 INJECTION, SOLUTION INTRAMUSCULAR; INTRAVENOUS at 05:12

## 2021-01-01 RX ADMIN — OXYCODONE HYDROCHLORIDE AND ACETAMINOPHEN 1 TABLET: 10; 325 TABLET ORAL at 10:01

## 2021-01-01 RX ADMIN — ACETAMINOPHEN 650 MG: 325 TABLET ORAL at 09:12

## 2021-01-01 RX ADMIN — TRAMADOL HYDROCHLORIDE 50 MG: 50 TABLET ORAL at 03:01

## 2021-01-01 RX ADMIN — SODIUM CHLORIDE: 0.9 INJECTION, SOLUTION INTRAVENOUS at 11:02

## 2021-01-01 RX ADMIN — LACTOBACILLUS ACIDOPHILUS / LACTOBACILLUS BULGARICUS 1 EACH: 100 MILLION CFU STRENGTH GRANULES at 10:02

## 2021-01-01 RX ADMIN — Medication 10 ML: at 05:02

## 2021-01-01 RX ADMIN — Medication 0.05 MCG/KG/MIN: at 10:06

## 2021-01-01 RX ADMIN — AZTREONAM 1000 MG: 1 INJECTION, POWDER, LYOPHILIZED, FOR SOLUTION INTRAMUSCULAR; INTRAVENOUS at 12:02

## 2021-01-01 RX ADMIN — HYDRALAZINE HYDROCHLORIDE 10 MG: 20 INJECTION, SOLUTION INTRAMUSCULAR; INTRAVENOUS at 10:12

## 2021-01-01 RX ADMIN — POTASSIUM CHLORIDE 10 MEQ: 10 INJECTION, SOLUTION INTRAVENOUS at 05:12

## 2021-01-01 RX ADMIN — MORPHINE SULFATE 2 MG: 2 INJECTION, SOLUTION INTRAMUSCULAR; INTRAVENOUS at 10:06

## 2021-01-01 RX ADMIN — DEXTROSE: 5 SOLUTION INTRAVENOUS at 07:02

## 2021-01-01 RX ADMIN — POTASSIUM BICARBONATE 20 MEQ: 391 TABLET, EFFERVESCENT ORAL at 01:02

## 2021-01-01 RX ADMIN — MEROPENEM 1 G: 1 INJECTION, POWDER, FOR SOLUTION INTRAVENOUS at 10:06

## 2021-01-01 RX ADMIN — SODIUM CHLORIDE 1000 ML: 0.9 INJECTION, SOLUTION INTRAVENOUS at 12:04

## 2021-01-01 RX ADMIN — SUCRALFATE 1 G: 1 TABLET ORAL at 05:06

## 2021-01-01 RX ADMIN — NOREPINEPHRINE BITARTRATE 0.2 MCG/KG/MIN: 1 INJECTION, SOLUTION, CONCENTRATE INTRAVENOUS at 01:02

## 2021-01-01 RX ADMIN — MEROPENEM AND SODIUM CHLORIDE 1 G: 1 INJECTION, SOLUTION INTRAVENOUS at 12:12

## 2021-01-04 PROBLEM — R21 RASH: Status: ACTIVE | Noted: 2021-01-01

## 2021-01-08 PROBLEM — K12.1 MOUTH ULCERATION: Status: ACTIVE | Noted: 2021-01-01

## 2021-02-09 PROBLEM — A41.9 SEPSIS: Status: ACTIVE | Noted: 2021-01-01

## 2021-02-10 PROBLEM — R53.1 GENERALIZED WEAKNESS: Status: ACTIVE | Noted: 2020-01-01

## 2021-02-12 PROBLEM — E83.51 HYPOCALCEMIA: Status: ACTIVE | Noted: 2021-01-01

## 2021-03-26 PROBLEM — R23.2 HOT FLASHES: Status: ACTIVE | Noted: 2021-01-01

## 2021-03-26 PROBLEM — E03.9 HYPOTHYROIDISM: Status: ACTIVE | Noted: 2021-01-01

## 2021-03-26 PROBLEM — R65.21 SEPTIC SHOCK: Status: RESOLVED | Noted: 2020-12-19 | Resolved: 2021-01-01

## 2021-03-26 PROBLEM — E78.5 HYPERLIPIDEMIA: Status: ACTIVE | Noted: 2021-01-01

## 2021-03-26 PROBLEM — R42 DIZZINESS: Status: RESOLVED | Noted: 2021-01-01 | Resolved: 2021-01-01

## 2021-03-26 PROBLEM — R11.0 NAUSEA: Status: ACTIVE | Noted: 2021-01-01

## 2021-03-26 PROBLEM — E66.9 OBESITY: Status: ACTIVE | Noted: 2021-01-01

## 2021-03-26 PROBLEM — K12.1 MOUTH ULCERATION: Status: RESOLVED | Noted: 2021-01-01 | Resolved: 2021-01-01

## 2021-03-26 PROBLEM — A41.9 SEPTIC SHOCK: Status: RESOLVED | Noted: 2020-12-19 | Resolved: 2021-01-01

## 2021-03-26 PROBLEM — R42 DIZZINESS: Status: ACTIVE | Noted: 2021-01-01

## 2021-03-26 PROBLEM — I63.9 CVA (CEREBRAL VASCULAR ACCIDENT): Status: ACTIVE | Noted: 2021-01-01

## 2021-03-26 PROBLEM — M79.606 LEG PAIN: Status: ACTIVE | Noted: 2021-01-01

## 2021-03-26 PROBLEM — A41.9 SEPSIS: Status: RESOLVED | Noted: 2021-01-01 | Resolved: 2021-01-01

## 2021-03-26 PROBLEM — M54.9 BACK PAIN: Status: ACTIVE | Noted: 2021-01-01

## 2021-03-26 PROBLEM — M75.02 ADHESIVE CAPSULITIS OF LEFT SHOULDER: Status: ACTIVE | Noted: 2021-01-01

## 2021-03-26 PROBLEM — F32.9 MAJOR DEPRESSIVE DISORDER: Status: ACTIVE | Noted: 2021-01-01

## 2021-03-26 PROBLEM — I10 HTN (HYPERTENSION): Status: ACTIVE | Noted: 2021-01-01

## 2021-03-26 PROBLEM — R06.00 DYSPNEA: Status: ACTIVE | Noted: 2021-01-01

## 2021-03-26 PROBLEM — A49.8 CLOSTRIDIUM DIFFICILE INFECTION: Status: RESOLVED | Noted: 2020-01-01 | Resolved: 2021-01-01
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
## 2021-03-26 PROBLEM — G43.909 MIGRAINES: Status: ACTIVE | Noted: 2021-01-01

## 2021-03-26 PROBLEM — N39.0 UTI (URINARY TRACT INFECTION): Status: RESOLVED | Noted: 2020-12-19 | Resolved: 2021-01-01

## 2021-05-28 NOTE — ASSESSMENT & PLAN NOTE
Current treatment: Ciprofloxacin.  With tailored based on culture and sensitivity.    Blood Culture, Routine   Date Value Ref Range Status   12/19/2020 No Growth to date  Preliminary   12/19/2020 No Growth to date  Preliminary   12/19/2020 No Growth to date  Preliminary     Urine Culture, Routine   Date Value Ref Range Status   12/19/2020 (A)  Preliminary    GRAM NEGATIVE RAVI  >100,000 cfu/ml  Identification and susceptibility pending               Pls call pt, no message  Labs look terrific!  - DM control is great!  A1c is 5.9 - can decrease metformin to 500 mg w/ dinner. Will recheck labs at [facility] in November  - lipids under perfect control w/ atorva 20  - kidney fn, liver tests, thyroid fn, blood ct are all nl.

## 2021-06-02 PROBLEM — K64.9 HEMORRHOID: Status: ACTIVE | Noted: 2021-01-01

## 2021-06-20 PROBLEM — N30.00 ACUTE CYSTITIS WITHOUT HEMATURIA: Status: ACTIVE | Noted: 2021-01-01

## 2021-06-21 PROBLEM — D62 ACUTE BLOOD LOSS ANEMIA: Status: ACTIVE | Noted: 2021-01-01

## 2021-08-27 PROBLEM — G62.9 NEUROPATHY: Status: ACTIVE | Noted: 2021-01-01

## 2021-08-27 PROBLEM — K21.9 GERD WITHOUT ESOPHAGITIS: Status: ACTIVE | Noted: 2021-01-01

## 2021-09-17 PROBLEM — J30.9 ALLERGIC RHINITIS: Status: ACTIVE | Noted: 2021-01-01

## 2021-09-17 PROBLEM — H91.93 BILATERAL HEARING LOSS: Status: ACTIVE | Noted: 2021-01-01

## 2021-12-20 NOTE — Clinical Note
Diagnosis: AMS (altered mental status) [[id number]]   Future Attending Provider: CYNDY BEY JR [[id number]]   Admitting Provider:: MICHI CASIANO [[id number]]   Special Needs:: Disoriented [14]

## 2021-12-20 NOTE — ED PROVIDER NOTES
EMERGENCY DEPARTMENT HISTORY AND PHYSICAL EXAM          Date: 12/20/2021   Patient Name: Smiley Harmon       History of Presenting Illness           Chief Complaint   Patient presents with    Altered Mental Status     Per nursing home client not acting her normal self. Had low O2 sats and a breathing treatment was given at the home.          History Provided By: EMS    0839   Smiley Harmon is a 81 y.o. female with PMHX of  dementia, CVA, CAD, mi, hypertension, hyperlipidemia, who presents to the emergency department C/O altered mental status.    Patient transferred from nursing home due to altered mental status.  Per EMS patient was noted to be altered this morning.  Normally has some orientation and is interactive.  This morning patient less responsive.  Was reportedly 80% on room air.  Patient is awake but provides no history.  She is in hospice but apparently is full code.      PCP: Osmany Allison Jr, MD        Current Facility-Administered Medications   Medication Dose Route Frequency Provider Last Rate Last Admin    0.9%  NaCl infusion (for blood administration)   Intravenous Q24H PRN Hardik Lainez MD        cefepime in dextrose 5 % IVPB 2 g  2 g Intravenous ED 1 Time Hardik Lainez MD        lactated ringers bolus 1,000 mL  1,000 mL Intravenous ED 1 Time Hardik Lainez MD        levoFLOXacin 750 mg/150 mL IVPB 750 mg  750 mg Intravenous Once Hardik Lainez MD        melatonin tablet 6 mg  6 mg Oral Nightly PRN Hardik Lainez MD        sodium chloride 0.9% flush 10 mL  10 mL Intravenous PRN Hardik Lainez MD         Current Outpatient Medications   Medication Sig Dispense Refill    acetaminophen (TYLENOL) 325 MG tablet Take 325 mg by mouth every 6 (six) hours as needed for Pain. 2 tablets      albuterol-ipratropium (DUO-NEB) 2.5 mg-0.5 mg/3 mL nebulizer solution Take 3 mLs by nebulization every 6 (six) hours as needed for Wheezing. Rescue 1 Box 5     ascorbic acid, vitamin C, (VITAMIN C) 500 MG tablet Take 500 mg by mouth once daily. For 30 days for COVID      budesonide (PULMICORT) 0.5 mg/2 mL nebulizer solution Take 2 mLs (0.5 mg total) by nebulization 2 (two) times a day. Controller 120 mL 0    carvediloL (COREG) 3.125 MG tablet Take 3.125 mg by mouth 2 (two) times daily with meals.      doxycycline (VIBRAMYCIN) 100 MG Cap Take 1 capsule (100 mg total) by mouth every 12 (twelve) hours. 20 capsule 0    fluticasone propionate (FLONASE) 50 mcg/actuation nasal spray 1 spray by Each Nostril route once daily.      gabapentin (NEURONTIN) 100 MG capsule TAKE 1 CAPSULE BY MOUTH TWICE DAILY 180 capsule 12    hydrALAZINE (APRESOLINE) 25 MG tablet Take 25 mg by mouth every 12 (twelve) hours as needed.      HYDROcodone-acetaminophen (NORCO)  mg per tablet Take 1 tablet by mouth every 8 (eight) hours as needed for Pain. 90 tablet 0    insulin lispro 100 unit/mL injection Inject into the skin 2 (two) times a day. Sliding scale      LIDOcaine HCl 2% (XYLOCAINE) 2 % Soln 10 mLs by Mucous Membrane route every 8 (eight) hours as needed. Mix 10mL Viscous Lidocaine with 30mL of Maalox and Benadryl 12.5mg/5mL for GI COCKTAIL      loratadine (CLARITIN) 10 mg tablet Take 10 mg by mouth once daily.      melatonin (MELATIN) 3 mg tablet Take 2 tablets (6 mg total) by mouth nightly as needed for Insomnia.  0    mirtazapine (REMERON) 15 MG tablet Take 15 mg by mouth every evening.      montelukast (SINGULAIR) 10 mg tablet Take 10 mg by mouth every evening.      pantoprazole (PROTONIX) 40 MG tablet TAKE 1 TABLET BY MOUTH TWICE DAILY 180 tablet 1    paroxetine (PAXIL) 20 MG tablet Take 20 mg by mouth every morning.      sucralfate (CARAFATE) 1 gram tablet Take 1 g by mouth 4 (four) times daily.      tiZANidine 4 mg Cap Take 4 mg by mouth every 8 (eight) hours as needed.             Past History     Past Medical History:   Past Medical History:   Diagnosis Date     Blood disorder     Clostridium difficile infection 2020    Colitis     Coronary artery disease     Depression     Diabetes mellitus     Dizziness 3/26/2021    GERD (gastroesophageal reflux disease)     H/O heart artery stent     Heart disease, unspecified     Hyperlipidemia     Hypertension     Memory loss     Mouth ulceration 2021    Myocardial infarct     Renal disorder     CKD    Sepsis 2021    Septic shock 2020    Stomach ulcer     Stroke     Thyroid disease     UTI (urinary tract infection) 2020        Past Surgical History:   Past Surgical History:   Procedure Laterality Date    APPENDECTOMY      BILATERAL SALPINGOOPHORECTOMY       SECTION      CHOLECYSTECTOMY      CORONARY ANGIOPLASTY WITH STENT PLACEMENT      DILATION AND CURETTAGE OF UTERUS      ELBOW SURGERY      HERNIA REPAIR      abdominal    HIP SURGERY      HYSTERECTOMY  age 27    bleeding    HYSTERECTOMY      KNEE SURGERY      SHOULDER OPEN ROTATOR CUFF REPAIR          Family History:   Family History   Problem Relation Age of Onset    Migraines Mother     Diabetes Mother     Thyroid disease Mother     Hypertension Mother     Cancer Maternal Grandmother         liver, lung    Cancer Paternal Grandfather     Stroke Paternal Aunt     Migraines Paternal Aunt     Migraines Son     Heart attack Father     Aneurysm Neg Hx     Seizures Neg Hx         Social History:   Social History     Tobacco Use    Smoking status: Former Smoker     Quit date: 2005     Years since quittin.9   Substance Use Topics    Alcohol use: No    Drug use: No        Allergies:   Review of patient's allergies indicates:   Allergen Reactions    Motrin [ibuprofen] Other (See Comments)     Feels like heart is swelling    Penicillins Hives and Itching    Sulfa (sulfonamide antibiotics) Nausea And Vomiting    Iodine and iodide containing products Edema    Lisinopril Edema          Review of  Systems   Review of Systems   Unable to perform ROS: Mental status change                Physical Exam     Vitals:    12/20/21 0947 12/20/21 1002 12/20/21 1017 12/20/21 1050   BP: 135/73 134/71 133/65 120/66   BP Location:       Patient Position:       Pulse: 82 87 80 110   Resp: 12 11 12   Temp:       SpO2: 96% 96% 98%    Weight:       Height:          Physical Exam  Vitals and nursing note reviewed.   Constitutional:       General: She is not in acute distress.     Appearance: She is overweight. She is ill-appearing.      Interventions: Nasal cannula in place.   HENT:      Head: Normocephalic and atraumatic.      Nose: Nose normal. No congestion or rhinorrhea.      Mouth/Throat:      Mouth: Mucous membranes are dry.   Eyes:      General: No scleral icterus.        Right eye: No discharge.         Left eye: No discharge.      Conjunctiva/sclera: Conjunctivae normal.      Pupils: Pupils are equal, round, and reactive to light.   Cardiovascular:      Rate and Rhythm: Regular rhythm. Tachycardia present.      Pulses: Normal pulses.      Heart sounds: Normal heart sounds. No murmur heard.      Pulmonary:      Effort: No respiratory distress.      Breath sounds: Rhonchi present.   Abdominal:      General: Bowel sounds are normal.      Palpations: Abdomen is soft.      Tenderness: There is no abdominal tenderness.   Musculoskeletal:         General: No tenderness or deformity. Normal range of motion.      Cervical back: Neck supple. No rigidity.      Right lower leg: No edema.      Left lower leg: No edema.   Skin:     General: Skin is dry.      Coloration: Skin is pale.   Neurological:      Mental Status: She is lethargic.      GCS: GCS eye subscore is 3. GCS verbal subscore is 1. GCS motor subscore is 5.   Psychiatric:         Behavior: Behavior normal.              Diagnostic Study Results      Labs -   Recent Results (from the past 12 hour(s))   POCT glucose    Collection Time: 12/20/21  8:59 AM   Result Value Ref  Range    POCT Glucose 94 70 - 110 mg/dL   POCT Influenza A/B Molecular    Collection Time: 12/20/21  9:07 AM   Result Value Ref Range    POC Molecular Influenza A Ag Negative Negative, Not Reported    POC Molecular Influenza B Ag Negative Negative, Not Reported     Acceptable Yes    CBC auto differential    Collection Time: 12/20/21  9:23 AM   Result Value Ref Range    WBC 9.23 3.90 - 12.70 K/uL    RBC 2.57 (L) 4.00 - 5.40 M/uL    Hemoglobin 7.0 (L) 12.0 - 16.0 g/dL    Hematocrit 24.3 (L) 37.0 - 48.5 %    MCV 95 82 - 98 fL    MCH 27.2 27.0 - 31.0 pg    MCHC 28.8 (L) 32.0 - 36.0 g/dL    RDW 14.7 (H) 11.5 - 14.5 %    Platelets 296 150 - 450 K/uL    MPV 9.8 9.2 - 12.9 fL    Immature Granulocytes 0.4 0.0 - 0.5 %    Gran # (ANC) 6.7 1.8 - 7.7 K/uL    Immature Grans (Abs) 0.04 0.00 - 0.04 K/uL    Lymph # 1.1 1.0 - 4.8 K/uL    Mono # 0.9 0.3 - 1.0 K/uL    Eos # 0.5 0.0 - 0.5 K/uL    Baso # 0.06 0.00 - 0.20 K/uL    nRBC 0 0 /100 WBC    Gran % 72.3 38.0 - 73.0 %    Lymph % 11.6 (L) 18.0 - 48.0 %    Mono % 9.8 4.0 - 15.0 %    Eosinophil % 5.2 0.0 - 8.0 %    Basophil % 0.7 0.0 - 1.9 %    Differential Method Automated    Comprehensive metabolic panel    Collection Time: 12/20/21  9:23 AM   Result Value Ref Range    Sodium 139 136 - 145 mmol/L    Potassium 4.0 3.5 - 5.1 mmol/L    Chloride 104 95 - 110 mmol/L    CO2 29 23 - 29 mmol/L    Glucose 87 70 - 110 mg/dL    BUN 35 (H) 8 - 23 mg/dL    Creatinine 4.6 (H) 0.5 - 1.4 mg/dL    Calcium 8.4 (L) 8.7 - 10.5 mg/dL    Total Protein 7.0 6.0 - 8.4 g/dL    Albumin 2.9 (L) 3.5 - 5.2 g/dL    Total Bilirubin 0.2 0.1 - 1.0 mg/dL    Alkaline Phosphatase 82 55 - 135 U/L    AST 18 10 - 40 U/L    ALT 11 10 - 44 U/L    Anion Gap 6 (L) 8 - 16 mmol/L    eGFR if African American 9.8 (A) >60 mL/min/1.73 m^2    eGFR if non African American 8.5 (A) >60 mL/min/1.73 m^2   Lipase    Collection Time: 12/20/21  9:23 AM   Result Value Ref Range    Lipase Result 20 (L) 23 - 300 U/L   TROPONIN  I HIGH SENSITIVITY    Collection Time: 12/20/21  9:23 AM   Result Value Ref Range    Troponin I High Sensitivity 7.4 0.0 - 60.0 pg/mL   Iron and TIBC    Collection Time: 12/20/21  9:23 AM   Result Value Ref Range    Iron 48 30 - 160 ug/dL    TIBC 341 250 - 450 ug/dL    Iron Saturation 14 (L) 20 - 50 %   POCT COVID-19 Rapid Screening    Collection Time: 12/20/21  9:29 AM   Result Value Ref Range    POC Rapid COVID Negative Negative     Acceptable Yes    Lactic acid, plasma #1    Collection Time: 12/20/21  9:31 AM   Result Value Ref Range    Lactate (Lactic Acid) 1.2 0.5 - 2.2 mmol/L   POCT Capillary Blood Gas-Resp    Collection Time: 12/20/21 10:30 AM   Result Value Ref Range    POC PH 7.251 7.210 - 7.310    POC PCO2 67.6 mmHg    POC PO2 27.4 mmHg    POC SATURATED O2 46.6 %    Correct Temperature (PH) 7.251     Corrected Temperature (pCO2) 67.6 mmHg    Corrected Temperature (pO2) 27.4 mmHg    POC HCO3 25.3 mmol/l    Base Deficit 2.4 mmol/l    POC TCO2 29.5 mmol/l    POC Temp 37.0 C    Specimen source Venous     Performed By: Foret     MODIFIED OG'S TEST NA     FiO2 32.0 %    O2DEVICE Nasal Cannula     LPM 3.0     Comments DRAWN BY ALEKSANDAR PHLEBOTOMIST    Urinalysis, Reflex to Urine Culture Urine, Clean Catch    Collection Time: 12/20/21 11:00 AM    Specimen: Urine, Catheterized   Result Value Ref Range    Specimen UA Urine, Catheterized     Color, UA Yellow Yellow, Straw, Rosana    Appearance, UA Cloudy (A) Clear    pH, UA 5.0 5.0 - 8.0    Specific Gravity, UA 1.015 1.005 - 1.030    Protein, UA 2+ (A) Negative    Glucose, UA Negative Negative    Ketones, UA Negative Negative    Bilirubin (UA) Negative Negative    Occult Blood UA 1+ (A) Negative    Nitrite, UA Positive (A) Negative    Urobilinogen, UA Negative <2.0 EU/dL    Leukocytes, UA 3+ (A) Negative   Drug screen panel, emergency    Collection Time: 12/20/21 11:00 AM   Result Value Ref Range    Benzodiazepines Negative Negative    Methadone  metabolites Negative Negative    Cocaine (Metab.) Negative Negative    Opiate Scrn, Ur Presumptive Positive (A) Negative    Barbiturate Screen, Ur Negative Negative    Amphetamine Screen, Ur Negative Negative    THC Negative Negative    Phencyclidine Negative Negative    Creatinine, Urine 103.0 15.0 - 325.0 mg/dL    Toxicology Information SEE COMMENT    Occult blood x 1, stool    Collection Time: 12/20/21 11:00 AM    Specimen: Stool   Result Value Ref Range    Occult Blood Negative Negative   Urinalysis Microscopic    Collection Time: 12/20/21 11:00 AM   Result Value Ref Range    RBC, UA 1 0 - 4 /hpf    WBC, UA >100 (H) 0 - 5 /hpf    Bacteria Few (A) None-Occ /hpf    Squam Epithel, UA 8 /hpf    Hyaline Casts, UA 3 (A) 0-1/lpf /lpf    Microscopic Comment SEE COMMENT         Radiologic Studies - \   CT Chest Without Contrast   Final Result      1. Pericardial effusion   2. Bibasilar and left lingular infiltrates and bilateral effusions left greater than right with diffusion posterior extending up to the left upper lobe         Electronically signed by: Natali Finley MD   Date:    12/20/2021   Time:    10:49      CT Head Without Contrast   Final Result      Chronic changes with no acute intracranial abnormality.      Opacified mastoid air cells on the left         Electronically signed by: Natali Finley MD   Date:    12/20/2021   Time:    10:45      X-Ray Chest AP Portable   Final Result      Cardiomegaly with perihilar and basilar infiltrates and or discoid atelectasis         Electronically signed by: Natali Finley MD   Date:    12/20/2021   Time:    10:31           Medications given in the ED-   Medications   0.9%  NaCl infusion (for blood administration) (has no administration in time range)   lactated ringers bolus 1,000 mL (has no administration in time range)   levoFLOXacin 750 mg/150 mL IVPB 750 mg (has no administration in time range)   cefepime in dextrose 5 % IVPB 2 g (has no administration in time range)    sodium chloride 0.9% flush 10 mL (has no administration in time range)   melatonin tablet 6 mg (has no administration in time range)   lactated ringers bolus 1,000 mL (1,000 mLs Intravenous New Bag 12/20/21 0953)           Medical Decision Making    I am the first provider for this patient.     I reviewed the vital signs, available nursing notes, past medical history, past surgical history, family history and social history.     Vital Signs-Reviewed the patient's vital signs.     Pulse Oximetry Analysis and Interpretation:    94% on NC, hypoxia requiring supplemental oxygen    Cardiac Monitor:   Interpreted by Dr. Hardik Lainez at 0910    Rate: 104 bpm   Rhythm: sinus tach     EKG interpretation: (Preliminary)   Interpreted by Hardik Lainez MD at 0847   Poor study.  Unknown rhythm.  Unable to discern P waves.  Appears regular.  No ST elevation     CXR  interpretation: (Preliminary)   CXR read by Dr. Hardik Lainez at 0911    Pulmonary edema, bilateral hazy infiltrates     Records review: Old medical records.     Provider Notes (Medical Decision Making): Smiley Harmon is a 81 y.o. female here with altered mental status.  Patient tachycardic.  Afebrile.  Has reported increased oxygenation requirements with supplemental oxygen.  Coarse lungs sounds throughout.  Bedside ultrasound reveals no B lines.  Chest x-ray  shows bilateral hazy infiltrates consistent with pneumonia versus pulmonary edema.  Plan on sepsis workup, altered mental status workup.    Procedures:   ED US Lung    Date/Time: 12/20/2021 10:15 AM  Performed by: Hardik Lainez MD  Authorized by: Hardik Lainez MD     Indication:  Hypoxia  Lung sliding:  Present  B-lines:  Absent (3 or less)  Lung sliding:  Present  B-lines:  Absent (3 or less)          ED Course:    9:56 AM  Covid negative.  No leukocytosis.  Hemoglobin 7.      10:58 AM  Spoken updated with patient's son.  Reports that he thinks he has been confused more than  couple days.  Says she was treated for pneumonia as an outpatient 2 weeks ago.  Reports that she has a history of anemia requiring transfusions in the past due to bone marrow issue      CONSULT NOTE:    12:02 PM      Dr. Lainez discussed care with?Dr Allison, Hospitalist   It was a standard discussion,?including history of patients chief complaint, available diagnostic results, and treatment course.?Discussed case. Agrees with admission.              Diagnosis and Disposition     12:03 PM     I have spent 38 minutes of critical care time involved in lab review, consultations with specialist, family decision-making, and documentation.  During this entire length of time I was immediately available to the patient.     Critical Care:  The reason for providing this level of medical care for this critically ill patient was due a critical illness that impaired one or more vital organ systems such that there was a high probability of imminent or life threatening deterioration in the patients condition. This care involved high complexity decision making to assess, manipulate, and support vital system functions, to treat this degreee vital organ system failure and to prevent further life threatening deterioration of the patients condition.                   CLINICAL IMPRESSION:        1. Multifocal pneumonia    2. AMS (altered mental status)    3. Pericardial effusion    4. Normocytic anemia    5. JOSE A (acute kidney injury)    6. Urinary tract infection without hematuria, site unspecified    7. Metabolic encephalopathy    8. Hypoxia              PLAN:   1. Admit to Hospital  2.      Medication List      ASK your doctor about these medications    acetaminophen 325 MG tablet  Commonly known as: TYLENOL     albuterol-ipratropium 2.5 mg-0.5 mg/3 mL nebulizer solution  Commonly known as: DUO-NEB  Take 3 mLs by nebulization every 6 (six) hours as needed for Wheezing. Rescue     ascorbic acid (vitamin C) 500 MG tablet  Commonly  known as: VITAMIN C     budesonide 0.5 mg/2 mL nebulizer solution  Commonly known as: PULMICORT  Take 2 mLs (0.5 mg total) by nebulization 2 (two) times a day. Controller     carvediloL 3.125 MG tablet  Commonly known as: COREG     doxycycline 100 MG Cap  Commonly known as: VIBRAMYCIN  Take 1 capsule (100 mg total) by mouth every 12 (twelve) hours.     fluticasone propionate 50 mcg/actuation nasal spray  Commonly known as: FLONASE     gabapentin 100 MG capsule  Commonly known as: NEURONTIN  TAKE 1 CAPSULE BY MOUTH TWICE DAILY     hydrALAZINE 25 MG tablet  Commonly known as: APRESOLINE     HYDROcodone-acetaminophen  mg per tablet  Commonly known as: NORCO  Take 1 tablet by mouth every 8 (eight) hours as needed for Pain.     insulin lispro 100 unit/mL injection     LIDOcaine HCl 2% 2 % Soln  Commonly known as: XYLOCAINE     loratadine 10 mg tablet  Commonly known as: CLARITIN     melatonin 3 mg tablet  Commonly known as: MELATIN  Take 2 tablets (6 mg total) by mouth nightly as needed for Insomnia.     mirtazapine 15 MG tablet  Commonly known as: REMERON     montelukast 10 mg tablet  Commonly known as: SINGULAIR     pantoprazole 40 MG tablet  Commonly known as: PROTONIX  TAKE 1 TABLET BY MOUTH TWICE DAILY     paroxetine 20 MG tablet  Commonly known as: PAXIL     sucralfate 1 gram tablet  Commonly known as: CARAFATE     tiZANidine 4 mg Cap           3. No follow-up provider specified.     _______________________________     Please note that this dictation was completed with [employer], the computer voice recognition software.  Quite often unanticipated grammatical, syntax, homophones, and other interpretive errors are inadvertently transcribed by the computer software.  Please disregard these errors.  Please excuse any errors that have escaped final proofreading.             Hardik Lainez MD  12/20/21 0036

## 2021-12-20 NOTE — ED NOTES
Client remains stable and does not appear to be having any adverse reactions at this time to blood administration. Client remains sleeping but wakes and follows commands when calling her name. Will continue to monitor.

## 2021-12-20 NOTE — ED NOTES
Client is stable at this time bed low and locked with door open so she can be seen from nurses station

## 2021-12-21 PROBLEM — I31.39 PERICARDIAL EFFUSION: Status: ACTIVE | Noted: 2021-01-01

## 2021-12-21 PROBLEM — G93.41 ENCEPHALOPATHY, METABOLIC: Status: ACTIVE | Noted: 2021-01-01

## 2021-12-21 PROBLEM — J18.9 MULTIFOCAL PNEUMONIA: Status: ACTIVE | Noted: 2021-01-01

## 2021-12-21 NOTE — ASSESSMENT & PLAN NOTE
Stable no active signs of bleeding  12/20:  S/p 1 unit PRBC    Recent Labs   Lab 06/24/21  0357 12/20/21  0923 12/21/21  0303   Hemoglobin 11.7 L 7.0 L 7.5 L

## 2021-12-21 NOTE — ASSESSMENT & PLAN NOTE
Suspect prerenal azotemia due to severe dehydration.  Continue to monitor with volume resuscitation.  Yancey catheter has been placed for some retention    Lab Results   Component Value Date    CREATININE 4.0 (H) 12/21/2021    CREATININE 4.6 (H) 12/20/2021    CREATININE 2.9 (H) 06/24/2021

## 2021-12-21 NOTE — ED NOTES
Pt responsive to tactile stimuli, but non-verbal, moans only. Yancey inserted, urine cloudy, thick, yellow. Soaked diaper noted. No BM. Pt temp done orally, pt covered in 5 blankets. Blankets removed and will re-check temp shortly.

## 2021-12-21 NOTE — PROGRESS NOTES
Pharmacokinetic Initial Assessment: IV Vancomycin    Assessment/Plan:    Initiate intravenous vancomycin with loading dose of 1500 mg once with subsequent doses when random concentrations are less than 20 mcg/mL  Desired empiric serum trough concentration is 15 to 20 mcg/mL  Draw vancomycin random level on 12/21 at 1000.  Pharmacy will continue to follow and monitor vancomycin.      Please contact pharmacy with any questions regarding this assessment.     Thank you for the consult,   Kavya Larose       Patient brief summary:  Smiley Harmon is a 81 y.o. female initiated on antimicrobial therapy with IV Vancomycin for treatment of suspected lower respiratory infection    Drug Allergies:   Review of patient's allergies indicates:   Allergen Reactions    Motrin [ibuprofen] Other (See Comments)     Feels like heart is swelling    Penicillins Hives and Itching    Sulfa (sulfonamide antibiotics) Nausea And Vomiting    Iodine and iodide containing products Edema    Lisinopril Edema       Actual Body Weight:   90.2kg    Renal Function:   Estimated Creatinine Clearance: 11.2 mL/min (A) (based on SCr of 4.6 mg/dL (H)).,     Dialysis Method (if applicable):  N/A    CBC (last 72 hours):  Recent Labs   Lab Result Units 12/20/21  0923   WBC K/uL 9.23   Hemoglobin g/dL 7.0*   Hematocrit % 24.3*   Platelets K/uL 296   Gran % % 72.3   Lymph % % 11.6*   Mono % % 9.8   Eosinophil % % 5.2   Basophil % % 0.7   Differential Method  Automated       Metabolic Panel (last 72 hours):  Recent Labs   Lab Result Units 12/20/21  0923 12/20/21  1100   Sodium mmol/L 139  --    Potassium mmol/L 4.0  --    Chloride mmol/L 104  --    CO2 mmol/L 29  --    Glucose mg/dL 87  --    Glucose, UA   --  Negative   BUN mg/dL 35*  --    Creatinine mg/dL 4.6*  --    Creatinine, Urine mg/dL  --  103.0   Albumin g/dL 2.9*  --    Total Bilirubin mg/dL 0.2  --    Alkaline Phosphatase U/L 82  --    AST U/L 18  --    ALT U/L 11  --        Drug levels (last  3 results):  No results for input(s): VANCOMYCINRA, VANCOMYCINPE, VANCOMYCINTR in the last 72 hours.    Microbiologic Results:  Microbiology Results (last 7 days)     Procedure Component Value Units Date/Time    Blood culture x two cultures. Draw prior to antibiotics. [[id number]] Collected: 12/20/21 0923    Order Status: Sent Specimen: Blood Updated: 12/20/21 1638    Blood culture x two cultures. Draw prior to antibiotics. [[id number]] Collected: 12/20/21 0931    Order Status: Sent Specimen: Blood Updated: 12/20/21 1638    Urine culture [[id number]] Collected: 12/20/21 1100    Order Status: No result Specimen: Urine Updated: 12/20/21 1143

## 2021-12-21 NOTE — H&P
[facility] Medicine  History & Physical    Patient Name: Smiley Harmon  MRN: [id number]  Patient Class: IP- Inpatient  Admission Date: 12/20/2021  Attending Physician: Osmany Allison Jr., MD   Primary Care Provider: Osmany Allison Jr, MD         Patient information was obtained from ER records.     Subjective:     Principal Problem:<principal problem not specified>    Chief Complaint:   Chief Complaint   Patient presents with    Altered Mental Status     Per nursing home client not acting her normal self. Had low O2 sats and a breathing treatment was given at the home.         HPI:   Chief Complaint   Patient presents with    Altered Mental Status       Per nursing home client not acting her normal self. Had low O2 sats and a breathing treatment was given at the home.           History Provided By: EMS     0839   Smiley Harmon is a 81 y.o. female with PMHX of  dementia, CVA, CAD, mi, hypertension, hyperlipidemia, who presents to the emergency department C/O altered mental status.     Patient transferred from nursing home due to altered mental status.  Per EMS patient was noted to be altered this morning.  Normally has some orientation and is interactive.  This morning patient less responsive.  Was reportedly 80% on room air.  Patient is awake but provides no history.  She is in hospice but apparently is full code.       PCP: Osmany Allison Jr, MD          Past Medical History:   Diagnosis Date    Blood disorder     Clostridium difficile infection 12/30/2020    Colitis     Coronary artery disease     Depression     Diabetes mellitus     Dizziness 3/26/2021    GERD (gastroesophageal reflux disease)     H/O heart artery stent     Heart disease, unspecified     Hyperlipidemia     Hypertension     Memory loss     Mouth ulceration 1/8/2021    Myocardial infarct     Renal disorder     CKD    Sepsis 2/9/2021    Septic shock 12/19/2020    Stomach ulcer     Stroke      Thyroid disease     UTI (urinary tract infection) 2020       Past Surgical History:   Procedure Laterality Date    APPENDECTOMY      BILATERAL SALPINGOOPHORECTOMY       SECTION      CHOLECYSTECTOMY      CORONARY ANGIOPLASTY WITH STENT PLACEMENT      DILATION AND CURETTAGE OF UTERUS      ELBOW SURGERY      HERNIA REPAIR      abdominal    HIP SURGERY      HYSTERECTOMY  age 27    bleeding    HYSTERECTOMY      KNEE SURGERY      SHOULDER OPEN ROTATOR CUFF REPAIR         Review of patient's allergies indicates:   Allergen Reactions    Motrin [ibuprofen] Other (See Comments)     Feels like heart is swelling    Penicillins Hives and Itching    Sulfa (sulfonamide antibiotics) Nausea And Vomiting    Iodine and iodide containing products Edema    Lisinopril Edema       No current facility-administered medications on file prior to encounter.     Current Outpatient Medications on File Prior to Encounter   Medication Sig    acetaminophen (TYLENOL) 325 MG tablet Take 325 mg by mouth every 6 (six) hours as needed for Pain. 2 tablets  (650 mg)    acetaminophen (TYLENOL) 650 MG Supp Place 650 mg rectally every 8 (eight) hours as needed (for discomfort).    albuterol-ipratropium (DUO-NEB) 2.5 mg-0.5 mg/3 mL nebulizer solution Take 3 mLs by nebulization every 6 (six) hours as needed for Wheezing. Rescue    benzonatate (TESSALON) 200 MG capsule Take 200 mg by mouth 3 (three) times daily as needed for Cough.    gabapentin (NEURONTIN) 100 MG capsule TAKE 1 CAPSULE BY MOUTH TWICE DAILY    hydrALAZINE (APRESOLINE) 25 MG tablet Take 25 mg by mouth every 12 (twelve) hours as needed.    HYDROcodone-acetaminophen (NORCO)  mg per tablet Take 1 tablet by mouth every 8 (eight) hours as needed for Pain.    hyoscyamine (ANASPAZ,LEVSIN) 0.125 mg Tab Take 125 mcg by mouth every 4 (four) hours as needed (PRN secretions).    insulin lispro 100 unit/mL injection Inject into the skin 2 (two) times a day.  Sliding scale:  <60 give OJ and repeat blood sugar in 15 minutes   = 0 units  151-175 = 3 units  176-200 = 5 units  201-250 = 7 units  251-300 = 10 units  301-350 = 13 units  351-400 = 16 units  > 400 = 20 units and call MD    lactulose (CHRONULAC) 10 gram/15 mL solution Take by mouth 4 (four) times daily. Every six (6) hours as needed for constipation    LORazepam (ATIVAN) 2 mg/mL injection Inject 1 mg into the muscle every 4 (four) hours as needed (anxiety).    melatonin (MELATIN) 3 mg tablet Take 2 tablets (6 mg total) by mouth nightly as needed for Insomnia.    mirtazapine (REMERON) 15 MG tablet Take 15 mg by mouth every evening.    montelukast (SINGULAIR) 10 mg tablet Take 10 mg by mouth every evening.    morphine 100 mg/5 mL (20 mg/mL) concentrated solution Take 10 mg by mouth every 4 (four) hours as needed for Pain.    ondansetron (ZOFRAN ODT ORAL) Take 4 mg by mouth 4 (four) times daily as needed (Nausea Vomiting).    pantoprazole (PROTONIX) 40 MG tablet TAKE 1 TABLET BY MOUTH TWICE DAILY    paroxetine (PAXIL) 20 MG tablet Take 20 mg by mouth every morning.    sucralfate (CARAFATE) 1 gram tablet Take 1 g by mouth 4 (four) times daily.    tiZANidine 4 mg Cap Take 4 mg by mouth every 8 (eight) hours as needed.    aluminum & magnesium hydroxide-simethicone (MYLANTA MAXIMUM STRENGTH) 400-400-40 mg/5 mL suspension Take 10 mLs by mouth every 4 (four) hours as needed for Indigestion.    budesonide (PULMICORT) 0.5 mg/2 mL nebulizer solution Take 2 mLs (0.5 mg total) by nebulization 2 (two) times a day. Controller     Family History     Problem Relation (Age of Onset)    Cancer Maternal Grandmother, Paternal Grandfather    Diabetes Mother    Heart attack Father    Hypertension Mother    Migraines Mother, Paternal Aunt, Son    Stroke Paternal Aunt    Thyroid disease Mother        Tobacco Use    Smoking status: Former Smoker     Quit date: 2005     Years since quittin.9    Smokeless  tobacco: Not on file   Substance and Sexual Activity    Alcohol use: No    Drug use: No    Sexual activity: Not Currently     Partners: Male     Birth control/protection: Surgical, Post-menopausal     Comment:      Review of Systems   Unable to perform ROS: Mental status change     Objective:     Vital Signs (Most Recent):  Temp: 97.1 °F (36.2 °C) (12/21/21 0704)  Pulse: 72 (12/21/21 0704)  Resp: 18 (12/21/21 0704)  BP: 119/66 (12/21/21 0704)  SpO2: (!) 92 % (12/21/21 0704) Vital Signs (24h Range):  Temp:  [97.1 °F (36.2 °C)-100 °F (37.8 °C)] 97.1 °F (36.2 °C)  Pulse:  [] 72  Resp:  [5-18] 18  SpO2:  [89 %-100 %] 92 %  BP: ()/(55-90) 119/66     Weight: 90.2 kg (198 lb 14.4 oz)  Body mass index is 32.1 kg/m².    Physical Exam  Vitals and nursing note reviewed.   Constitutional:       Appearance: Normal appearance. She is ill-appearing.   HENT:      Head: Normocephalic and atraumatic.      Nose: Nose normal.      Mouth/Throat:      Mouth: Mucous membranes are dry.   Eyes:      Extraocular Movements: Extraocular movements intact.      Pupils: Pupils are equal, round, and reactive to light.   Cardiovascular:      Rate and Rhythm: Regular rhythm. Tachycardia present.      Heart sounds: No murmur heard.  No friction rub. No gallop.    Pulmonary:      Effort: Pulmonary effort is normal.      Breath sounds: Rhonchi present.   Abdominal:      General: Abdomen is flat. Bowel sounds are normal.      Palpations: Abdomen is soft.   Musculoskeletal:         General: No swelling or deformity.      Cervical back: Normal range of motion and neck supple.   Skin:     General: Skin is warm and dry.      Capillary Refill: Capillary refill takes less than 2 seconds.      Coloration: Skin is pale.   Neurological:      General: No focal deficit present.      Mental Status: She is alert.      Comments: lethargic   Psychiatric:         Mood and Affect: Mood normal.         Behavior: Behavior normal.           CRANIAL  NERVES     CN III, IV, VI   Pupils are equal, round, and reactive to light.       Significant Labs: All pertinent labs within the past 24 hours have been reviewed.    Significant Imaging: I have reviewed all pertinent imaging results/findings within the past 24 hours.    Assessment/Plan:     Pericardial effusion  This is mild and does not appear to be causing any tamponade.  Continue to monitor.      Encephalopathy, metabolic  Secondary to uremia and acute kidney failure.  Continue to monitor with IV resuscitation      Multifocal pneumonia  Noted by x-ray.  Continue current antimicrobial regimen which will include meropenem and vancomycin given the patient's severity of illness.  Will tailored based on culture and sensitivity.      Hyperlipidemia  Continue statin      Obesity  Recommendations: Stay Active. As tolerated alternate resistance training with stretching and cardio. Goal of 150 minutes per week or 7,500 - 10,000 steps per day. Follow the Mediterranean Diet. Include fruit, vegetables, olive oil, seeds, nuts, whole grains, cold water fish and lean cuts of meat.  Do not drink beverages with any caloric content.  Decrease portion sizes slightly which will result in an approximately 500-calorie deficit.  Consider substituting one meal a day with a meal replacement such as Slim fast, lean cuisine, or weight watcher's.  Avoid fast or fried food.        Anemia  Stable no active signs of bleeding  12/20:  S/p 1 unit PRBC    Recent Labs   Lab 06/24/21  0357 12/20/21  0923 12/21/21  0303   Hemoglobin 11.7 L 7.0 L 7.5 L         Diabetes mellitus, type 2  Last A1c reviewed-   Lab Results   Component Value Date    HGBA1C 5.3 06/20/2021     Most recent fingerstick glucose reviewed- No results for input(s): POCTGLUCOSE in the last 24 hours.  Current correctional scale:  Low  Anti-hyperglycemic dose as follows-   Antihyperglycemics (From admission, onward)            None        Hold Oral hypoglycemics while patient is in  the hospital.        Acute renal failure  Suspect prerenal azotemia due to severe dehydration.  Continue to monitor with volume resuscitation.  Yancey catheter has been placed for some retention    Lab Results   Component Value Date    CREATININE 4.0 (H) 12/21/2021    CREATININE 4.6 (H) 12/20/2021    CREATININE 2.9 (H) 06/24/2021              VTE Risk Mitigation (From admission, onward)         Ordered     IP VTE HIGH RISK PATIENT  Once         12/20/21 1200     Place sequential compression device  Until discontinued         12/20/21 1200                   Osmany Allison Jr, MD  Department of Hospital Medicine   [facility]

## 2021-12-21 NOTE — PLAN OF CARE
Patient eligible for outpatient case management, however, patient resides at the nursing home so outpatient case management referral not sent.

## 2021-12-21 NOTE — PLAN OF CARE
12/21/21 0712   Medicare Message   Important Message from Medicare regarding Discharge Appeal Rights Signed/date by patient/caregiver   Date IMM was signed 12/20/21     IMM obtained by registration.  No time noted.

## 2021-12-21 NOTE — ASSESSMENT & PLAN NOTE
Recommendations: Stay Active. As tolerated alternate resistance training with stretching and cardio. Goal of 150 minutes per week or 7,500 - 10,000 steps per day. Follow the Mediterranean Diet. Include fruit, vegetables, olive oil, seeds, nuts, whole grains, cold water fish and lean cuts of meat.  Do not drink beverages with any caloric content.  Decrease portion sizes slightly which will result in an approximately 500-calorie deficit.  Consider substituting one meal a day with a meal replacement such as Slim fast, lean cuisine, or weight watcher's.  Avoid fast or fried food.

## 2021-12-21 NOTE — SUBJECTIVE & OBJECTIVE
Past Medical History:   Diagnosis Date    Blood disorder     Clostridium difficile infection 2020    Colitis     Coronary artery disease     Depression     Diabetes mellitus     Dizziness 3/26/2021    GERD (gastroesophageal reflux disease)     H/O heart artery stent     Heart disease, unspecified     Hyperlipidemia     Hypertension     Memory loss     Mouth ulceration 2021    Myocardial infarct     Renal disorder     CKD    Sepsis 2021    Septic shock 2020    Stomach ulcer     Stroke     Thyroid disease     UTI (urinary tract infection) 2020       Past Surgical History:   Procedure Laterality Date    APPENDECTOMY      BILATERAL SALPINGOOPHORECTOMY       SECTION      CHOLECYSTECTOMY      CORONARY ANGIOPLASTY WITH STENT PLACEMENT      DILATION AND CURETTAGE OF UTERUS      ELBOW SURGERY      HERNIA REPAIR      abdominal    HIP SURGERY      HYSTERECTOMY  age 27    bleeding    HYSTERECTOMY      KNEE SURGERY      SHOULDER OPEN ROTATOR CUFF REPAIR         Review of patient's allergies indicates:   Allergen Reactions    Motrin [ibuprofen] Other (See Comments)     Feels like heart is swelling    Penicillins Hives and Itching    Sulfa (sulfonamide antibiotics) Nausea And Vomiting    Iodine and iodide containing products Edema    Lisinopril Edema       No current facility-administered medications on file prior to encounter.     Current Outpatient Medications on File Prior to Encounter   Medication Sig    acetaminophen (TYLENOL) 325 MG tablet Take 325 mg by mouth every 6 (six) hours as needed for Pain. 2 tablets  (650 mg)    acetaminophen (TYLENOL) 650 MG Supp Place 650 mg rectally every 8 (eight) hours as needed (for discomfort).    albuterol-ipratropium (DUO-NEB) 2.5 mg-0.5 mg/3 mL nebulizer solution Take 3 mLs by nebulization every 6 (six) hours as needed for Wheezing. Rescue    benzonatate (TESSALON) 200 MG capsule Take 200 mg by mouth 3 (three)  times daily as needed for Cough.    gabapentin (NEURONTIN) 100 MG capsule TAKE 1 CAPSULE BY MOUTH TWICE DAILY    hydrALAZINE (APRESOLINE) 25 MG tablet Take 25 mg by mouth every 12 (twelve) hours as needed.    HYDROcodone-acetaminophen (NORCO)  mg per tablet Take 1 tablet by mouth every 8 (eight) hours as needed for Pain.    hyoscyamine (ANASPAZ,LEVSIN) 0.125 mg Tab Take 125 mcg by mouth every 4 (four) hours as needed (PRN secretions).    insulin lispro 100 unit/mL injection Inject into the skin 2 (two) times a day. Sliding scale:  <60 give OJ and repeat blood sugar in 15 minutes   = 0 units  151-175 = 3 units  176-200 = 5 units  201-250 = 7 units  251-300 = 10 units  301-350 = 13 units  351-400 = 16 units  > 400 = 20 units and call MD    lactulose (CHRONULAC) 10 gram/15 mL solution Take by mouth 4 (four) times daily. Every six (6) hours as needed for constipation    LORazepam (ATIVAN) 2 mg/mL injection Inject 1 mg into the muscle every 4 (four) hours as needed (anxiety).    melatonin (MELATIN) 3 mg tablet Take 2 tablets (6 mg total) by mouth nightly as needed for Insomnia.    mirtazapine (REMERON) 15 MG tablet Take 15 mg by mouth every evening.    montelukast (SINGULAIR) 10 mg tablet Take 10 mg by mouth every evening.    morphine 100 mg/5 mL (20 mg/mL) concentrated solution Take 10 mg by mouth every 4 (four) hours as needed for Pain.    ondansetron (ZOFRAN ODT ORAL) Take 4 mg by mouth 4 (four) times daily as needed (Nausea Vomiting).    pantoprazole (PROTONIX) 40 MG tablet TAKE 1 TABLET BY MOUTH TWICE DAILY    paroxetine (PAXIL) 20 MG tablet Take 20 mg by mouth every morning.    sucralfate (CARAFATE) 1 gram tablet Take 1 g by mouth 4 (four) times daily.    tiZANidine 4 mg Cap Take 4 mg by mouth every 8 (eight) hours as needed.    aluminum & magnesium hydroxide-simethicone (MYLANTA MAXIMUM STRENGTH) 400-400-40 mg/5 mL suspension Take 10 mLs by mouth every 4 (four) hours as needed for  Indigestion.    budesonide (PULMICORT) 0.5 mg/2 mL nebulizer solution Take 2 mLs (0.5 mg total) by nebulization 2 (two) times a day. Controller     Family History     Problem Relation (Age of Onset)    Cancer Maternal Grandmother, Paternal Grandfather    Diabetes Mother    Heart attack Father    Hypertension Mother    Migraines Mother, Paternal Aunt, Son    Stroke Paternal Aunt    Thyroid disease Mother        Tobacco Use    Smoking status: Former Smoker     Quit date: 2005     Years since quittin.9    Smokeless tobacco: Not on file   Substance and Sexual Activity    Alcohol use: No    Drug use: No    Sexual activity: Not Currently     Partners: Male     Birth control/protection: Surgical, Post-menopausal     Comment:      Review of Systems   Unable to perform ROS: Mental status change     Objective:     Vital Signs (Most Recent):  Temp: 97.1 °F (36.2 °C) (21)  Pulse: 72 (21)  Resp: 18 (21)  BP: 119/66 (21)  SpO2: (!) 92 % (21) Vital Signs (24h Range):  Temp:  [97.1 °F (36.2 °C)-100 °F (37.8 °C)] 97.1 °F (36.2 °C)  Pulse:  [] 72  Resp:  [5-18] 18  SpO2:  [89 %-100 %] 92 %  BP: ()/(55-90) 119/66     Weight: 90.2 kg (198 lb 14.4 oz)  Body mass index is 32.1 kg/m².    Physical Exam  Vitals and nursing note reviewed.   Constitutional:       Appearance: Normal appearance. She is ill-appearing.   HENT:      Head: Normocephalic and atraumatic.      Nose: Nose normal.      Mouth/Throat:      Mouth: Mucous membranes are dry.   Eyes:      Extraocular Movements: Extraocular movements intact.      Pupils: Pupils are equal, round, and reactive to light.   Cardiovascular:      Rate and Rhythm: Regular rhythm. Tachycardia present.      Heart sounds: No murmur heard.  No friction rub. No gallop.    Pulmonary:      Effort: Pulmonary effort is normal.      Breath sounds: Rhonchi present.   Abdominal:      General: Abdomen is flat. Bowel sounds  are normal.      Palpations: Abdomen is soft.   Musculoskeletal:         General: No swelling or deformity.      Cervical back: Normal range of motion and neck supple.   Skin:     General: Skin is warm and dry.      Capillary Refill: Capillary refill takes less than 2 seconds.      Coloration: Skin is pale.   Neurological:      General: No focal deficit present.      Mental Status: She is alert.      Comments: lethargic   Psychiatric:         Mood and Affect: Mood normal.         Behavior: Behavior normal.           CRANIAL NERVES     CN III, IV, VI   Pupils are equal, round, and reactive to light.       Significant Labs: All pertinent labs within the past 24 hours have been reviewed.    Significant Imaging: I have reviewed all pertinent imaging results/findings within the past 24 hours.

## 2021-12-21 NOTE — PROGRESS NOTES
Pharmacist Renal Dose Adjustment Note    Smiley Harmon is a 81 y.o. female being treated with the medication cefepime    Patient Data:    Vital Signs (Most Recent):  Temp: 99.1 °F (37.3 °C) (12/20/21 2029)  Pulse: 82 (12/20/21 2029)  Resp: 14 (12/20/21 2029)  BP: (!) 96/55 (12/20/21 2029)  SpO2: (!) 92 % (12/20/21 2029) Vital Signs (72h Range):  Temp:  [98.3 °F (36.8 °C)-100 °F (37.8 °C)]   Pulse:  []   Resp:  [5-18]   BP: ()/(55-90)   SpO2:  [89 %-100 %]      Recent Labs   Lab 12/20/21  0923   CREATININE 4.6*     Serum creatinine: 4.6 mg/dL (H) 12/20/21 0923  Estimated creatinine clearance: 11.2 mL/min (A)    Medication:cefepime dose: 2g frequency q8h will be changed to medication:cefepime dose:2g frequency:q24h    Pharmacist's Name: Kavya Larose

## 2021-12-21 NOTE — PLAN OF CARE
[facility]  Initial Discharge Assessment       Primary Care Provider: Osmany Allison Jr, MD    Admission Diagnosis: Metabolic encephalopathy [G93.41]  Pericardial effusion [I31.3]  Normocytic anemia [D64.9]  Hypoxia [R09.02]  JOSE A (acute kidney injury) [N17.9]  Urinary tract infection without hematuria, site unspecified [N39.0]  Multifocal pneumonia [J18.9]  AMS (altered mental status) [R41.82]    Admission Date: 12/20/2021  Expected Discharge Date:          Payor: [email or web address] MEDICARE / Plan: [employer] (SPECIAL NEEDS PLAN) / Product Type: Medicare Advantage /     Extended Emergency Contact Information  Primary Emergency Contact: Arie Harmon   [place]  Mobile Phone: [phone]  Relation: Son    Discharge Plan A: New Nursing Home placement - long-term care facility  Discharge Plan B: New Nursing Home placement - long-term care facility      [facility] [place], [place] - [address],   [address],   [place] [address]  Phone: [phone] Fax: [phone]      Initial Assessment (most recent)     Adult Discharge Assessment - 12/21/21 1157        Discharge Assessment    Assessment Type Discharge Planning Assessment     Source of Information patient     Lives With facility resident     Facility Arrived From: [facility]     Do you expect to return to your current living situation? No   SW spoke to the patient's son, and he would like to transfer his mom to [facility].    Do you have help at home or someone to help you manage your care at home? --   The nursing staff at [facility] are able to assist the patient with her needs.    Current cognitive status: Unable to Assess     Walking or Climbing Stairs Difficulty ambulation difficulty, dependent     Mobility Management wheelchair     Dressing/Bathing Difficulty dressing difficulty, dependent;bathing difficulty, dependent     Home Accessibility wheelchair  accessible     Home Layout Able to live on 1st floor     Equipment Currently Used at Home wheelchair     Readmission within 30 days? No     Patient currently being followed by outpatient case management? No     Do you currently have service(s) that help you manage your care at home? No     Do you take prescription medications? Yes     Do you have prescription coverage? Yes     Do you have any problems affording any of your prescribed medications? No     Is the patient taking medications as prescribed? yes     Who is going to help you get home at discharge? --   TBD    How do you get to doctors appointments? other (see comments)   [facility]    Discharge Plan A New Nursing Home placement - long term care facility     Discharge Plan B New Nursing Home placement - long term care facility     Discharge Plan discussed with: Adult children;POA     Name(s) and Number(s) Arie (son) [phone]               Initial discharged assessment is completed. The SW was able to complete the assessment with the patient's son, Arie. The patient is a resident at [facility]. She requires full assistance with her ADLs. The patient's son would like to transfer the patient to [facility] in [place] upon discharge. The son gave a verbal consent for the patient's choice form to be signed.      Will continue to monitor.

## 2021-12-21 NOTE — ASSESSMENT & PLAN NOTE
Last A1c reviewed-   Lab Results   Component Value Date    HGBA1C 5.3 06/20/2021     Most recent fingerstick glucose reviewed- No results for input(s): POCTGLUCOSE in the last 24 hours.  Current correctional scale:  Low  Anti-hyperglycemic dose as follows-   Antihyperglycemics (From admission, onward)            None        Hold Oral hypoglycemics while patient is in the hospital.

## 2021-12-21 NOTE — ASSESSMENT & PLAN NOTE
Noted by x-ray.  Continue current antimicrobial regimen which will include meropenem and vancomycin given the patient's severity of illness.  Will tailored based on culture and sensitivity.

## 2021-12-21 NOTE — PROGRESS NOTES
Pharmacist Renal Dose Adjustment Note    Smiley Harmon is a 81 y.o. female being treated with the medication meropenem    Patient Data:    Vital Signs (Most Recent):  Temp: 98.2 °F (36.8 °C) (12/20/21 2217)  Pulse: 80 (12/20/21 2217)  Resp: 18 (12/20/21 2217)  BP: (!) 104/57 (12/20/21 2217)  SpO2: 98 % (12/20/21 2217) Vital Signs (72h Range):  Temp:  [98.2 °F (36.8 °C)-100 °F (37.8 °C)]   Pulse:  []   Resp:  [5-18]   BP: ()/(55-90)   SpO2:  [89 %-100 %]      Recent Labs   Lab 12/20/21 0923   CREATININE 4.6*     Serum creatinine: 4.6 mg/dL (H) 12/20/21 0923  Estimated creatinine clearance: 11.2 mL/min (A)    Medication:meropenem dose: 2g frequency q8h will be changed to medication:meropenem dose:1g frequency:q12h    Pharmacist's Name: Kavya Larose

## 2021-12-21 NOTE — HPI
Chief Complaint   Patient presents with    Altered Mental Status       Per nursing home client not acting her normal self. Had low O2 sats and a breathing treatment was given at the home.           History Provided By: EMS     0839   Smiley Harmon is a 81 y.o. female with PMHX of  dementia, CVA, CAD, mi, hypertension, hyperlipidemia, who presents to the emergency department C/O altered mental status.     Patient transferred from nursing home due to altered mental status.  Per EMS patient was noted to be altered this morning.  Normally has some orientation and is interactive.  This morning patient less responsive.  Was reportedly 80% on room air.  Patient is awake but provides no history.  She is in hospice but apparently is full code.       PCP: Osmany Allison Jr, MD

## 2021-12-22 NOTE — SUBJECTIVE & OBJECTIVE
Past Medical History:   Diagnosis Date    Blood disorder     Clostridium difficile infection 2020    Colitis     Coronary artery disease     Depression     Diabetes mellitus     Dizziness 3/26/2021    GERD (gastroesophageal reflux disease)     H/O heart artery stent     Heart disease, unspecified     Hyperlipidemia     Hypertension     Memory loss     Mouth ulceration 2021    Myocardial infarct     Renal disorder     CKD    Sepsis 2021    Septic shock 2020    Stomach ulcer     Stroke     Thyroid disease     UTI (urinary tract infection) 2020       Past Surgical History:   Procedure Laterality Date    APPENDECTOMY      BILATERAL SALPINGOOPHORECTOMY       SECTION      CHOLECYSTECTOMY      CORONARY ANGIOPLASTY WITH STENT PLACEMENT      DILATION AND CURETTAGE OF UTERUS      ELBOW SURGERY      HERNIA REPAIR      abdominal    HIP SURGERY      HYSTERECTOMY  age 27    bleeding    HYSTERECTOMY      KNEE SURGERY      SHOULDER OPEN ROTATOR CUFF REPAIR         Review of patient's allergies indicates:   Allergen Reactions    Motrin [ibuprofen] Other (See Comments)     Feels like heart is swelling    Penicillins Hives and Itching    Sulfa (sulfonamide antibiotics) Nausea And Vomiting    Iodine and iodide containing products Edema    Lisinopril Edema       No current facility-administered medications on file prior to encounter.     Current Outpatient Medications on File Prior to Encounter   Medication Sig    acetaminophen (TYLENOL) 325 MG tablet Take 325 mg by mouth every 6 (six) hours as needed for Pain. 2 tablets  (650 mg)    acetaminophen (TYLENOL) 650 MG Supp Place 650 mg rectally every 8 (eight) hours as needed (for discomfort).    albuterol-ipratropium (DUO-NEB) 2.5 mg-0.5 mg/3 mL nebulizer solution Take 3 mLs by nebulization every 6 (six) hours as needed for Wheezing. Rescue    benzonatate (TESSALON) 200 MG capsule Take 200 mg by mouth 3 (three)  times daily as needed for Cough.    gabapentin (NEURONTIN) 100 MG capsule TAKE 1 CAPSULE BY MOUTH TWICE DAILY    hydrALAZINE (APRESOLINE) 25 MG tablet Take 25 mg by mouth every 12 (twelve) hours as needed.    HYDROcodone-acetaminophen (NORCO)  mg per tablet Take 1 tablet by mouth every 8 (eight) hours as needed for Pain.    hyoscyamine (ANASPAZ,LEVSIN) 0.125 mg Tab Take 125 mcg by mouth every 4 (four) hours as needed (PRN secretions).    insulin lispro 100 unit/mL injection Inject into the skin 2 (two) times a day. Sliding scale:  <60 give OJ and repeat blood sugar in 15 minutes   = 0 units  151-175 = 3 units  176-200 = 5 units  201-250 = 7 units  251-300 = 10 units  301-350 = 13 units  351-400 = 16 units  > 400 = 20 units and call MD    lactulose (CHRONULAC) 10 gram/15 mL solution Take by mouth 4 (four) times daily. Every six (6) hours as needed for constipation    LORazepam (ATIVAN) 2 mg/mL injection Inject 1 mg into the muscle every 4 (four) hours as needed (anxiety).    melatonin (MELATIN) 3 mg tablet Take 2 tablets (6 mg total) by mouth nightly as needed for Insomnia.    mirtazapine (REMERON) 15 MG tablet Take 15 mg by mouth every evening.    montelukast (SINGULAIR) 10 mg tablet Take 10 mg by mouth every evening.    morphine 100 mg/5 mL (20 mg/mL) concentrated solution Take 10 mg by mouth every 4 (four) hours as needed for Pain.    ondansetron (ZOFRAN ODT ORAL) Take 4 mg by mouth 4 (four) times daily as needed (Nausea Vomiting).    pantoprazole (PROTONIX) 40 MG tablet TAKE 1 TABLET BY MOUTH TWICE DAILY    paroxetine (PAXIL) 20 MG tablet Take 20 mg by mouth every morning.    sucralfate (CARAFATE) 1 gram tablet Take 1 g by mouth 4 (four) times daily.    tiZANidine 4 mg Cap Take 4 mg by mouth every 8 (eight) hours as needed.    aluminum & magnesium hydroxide-simethicone (MYLANTA MAXIMUM STRENGTH) 400-400-40 mg/5 mL suspension Take 10 mLs by mouth every 4 (four) hours as needed for  Indigestion.    budesonide (PULMICORT) 0.5 mg/2 mL nebulizer solution Take 2 mLs (0.5 mg total) by nebulization 2 (two) times a day. Controller     Family History     Problem Relation (Age of Onset)    Cancer Maternal Grandmother, Paternal Grandfather    Diabetes Mother    Heart attack Father    Hypertension Mother    Migraines Mother, Paternal Aunt, Son    Stroke Paternal Aunt    Thyroid disease Mother        Tobacco Use    Smoking status: Former Smoker     Quit date: 2005     Years since quittin.9    Smokeless tobacco: Not on file   Substance and Sexual Activity    Alcohol use: No    Drug use: No    Sexual activity: Not Currently     Partners: Male     Birth control/protection: Surgical, Post-menopausal     Comment:      Review of Systems   Unable to perform ROS: Mental status change     Objective:     Vital Signs (Most Recent):  Temp: 97.5 °F (36.4 °C) (21)  Pulse: 62 (21)  Resp: 18 (21)  BP: 137/67 (21)  SpO2: 99 % (21) Vital Signs (24h Range):  Temp:  [97.5 °F (36.4 °C)-98.6 °F (37 °C)] 97.5 °F (36.4 °C)  Pulse:  [62-87] 62  Resp:  [18-20] 18  SpO2:  [88 %-99 %] 99 %  BP: (128-186)/(62-83) 137/67     Weight: 90.2 kg (198 lb 14.4 oz)  Body mass index is 32.1 kg/m².    Physical Exam  Vitals and nursing note reviewed.   Constitutional:       Appearance: Normal appearance. She is ill-appearing.   HENT:      Head: Normocephalic and atraumatic.      Nose: Nose normal.      Mouth/Throat:      Mouth: Mucous membranes are dry.   Eyes:      Extraocular Movements: Extraocular movements intact.      Pupils: Pupils are equal, round, and reactive to light.   Cardiovascular:      Rate and Rhythm: Regular rhythm. Tachycardia present.      Heart sounds: No murmur heard.  No friction rub. No gallop.    Pulmonary:      Effort: Pulmonary effort is normal.      Breath sounds: Rhonchi present.   Abdominal:      General: Abdomen is flat. Bowel sounds are  normal.      Palpations: Abdomen is soft.   Musculoskeletal:         General: No swelling or deformity.      Cervical back: Normal range of motion and neck supple.   Skin:     General: Skin is warm and dry.      Capillary Refill: Capillary refill takes less than 2 seconds.      Coloration: Skin is pale.   Neurological:      General: No focal deficit present.      Mental Status: She is alert.      Comments: lethargic   Psychiatric:         Mood and Affect: Mood normal.         Behavior: Behavior normal.           CRANIAL NERVES     CN III, IV, VI   Pupils are equal, round, and reactive to light.       Significant Labs: All pertinent labs within the past 24 hours have been reviewed.    Significant Imaging: I have reviewed all pertinent imaging results/findings within the past 24 hours.

## 2021-12-22 NOTE — HOSPITAL COURSE
12/22: Patient resting comfortably this morning.  No acute events overnight.  A.m. labs are pending.  12/23:  Patient more alert this am. Resting comfortable asking for breakfast.  12/27: Patient nonverbal this morning but resting comfortably.  12/28: Patient status post a course of antimicrobial therapy.  She now has opacification of the hemithorax.  Suspect fluid related.  12/29:  patient agonal breathing this morning.  Suspect worsening mucous plugging.  Family made aware.     Patient ultimately passed away.  Family made aware.

## 2021-12-22 NOTE — PROGRESS NOTES
[facility]  Progress Note    Patient Name: Smiley Harmon  MRN: [id number]  Patient Class: IP- Inpatient   Admission Date: 12/20/2021  Length of Stay: 2 days  Attending Physician: Osmany Allison Jr., MD  Primary Care Provider: Osmany Allison Jr, MD        Subjective:     Principal Problem:<principal problem not specified>        HPI:  Chief Complaint   Patient presents with    Altered Mental Status       Per nursing home client not acting her normal self. Had low O2 sats and a breathing treatment was given at the home.           History Provided By: EMS     0839   Smiley Harmon is a 81 y.o. female with PMHX of  dementia, CVA, CAD, mi, hypertension, hyperlipidemia, who presents to the emergency department C/O altered mental status.     Patient transferred from nursing home due to altered mental status.  Per EMS patient was noted to be altered this morning.  Normally has some orientation and is interactive.  This morning patient less responsive.  Was reportedly 80% on room air.  Patient is awake but provides no history.  She is in hospice but apparently is full code.       PCP: Osmany Allison Jr, MD          Overview/Hospital Course:  12/22: Patient resting comfortably this morning.  No acute events overnight.  A.m. labs are pending.      Past Medical History:   Diagnosis Date    Blood disorder     Clostridium difficile infection 12/30/2020    Colitis     Coronary artery disease     Depression     Diabetes mellitus     Dizziness 3/26/2021    GERD (gastroesophageal reflux disease)     H/O heart artery stent     Heart disease, unspecified     Hyperlipidemia     Hypertension     Memory loss     Mouth ulceration 1/8/2021    Myocardial infarct     Renal disorder     CKD    Sepsis 2/9/2021    Septic shock 12/19/2020    Stomach ulcer     Stroke     Thyroid disease     UTI (urinary tract infection) 12/19/2020       Past Surgical History:   Procedure Laterality  Date    APPENDECTOMY      BILATERAL SALPINGOOPHORECTOMY       SECTION      CHOLECYSTECTOMY      CORONARY ANGIOPLASTY WITH STENT PLACEMENT      DILATION AND CURETTAGE OF UTERUS      ELBOW SURGERY      HERNIA REPAIR      abdominal    HIP SURGERY      HYSTERECTOMY  age 27    bleeding    HYSTERECTOMY      KNEE SURGERY      SHOULDER OPEN ROTATOR CUFF REPAIR         Review of patient's allergies indicates:   Allergen Reactions    Motrin [ibuprofen] Other (See Comments)     Feels like heart is swelling    Penicillins Hives and Itching    Sulfa (sulfonamide antibiotics) Nausea And Vomiting    Iodine and iodide containing products Edema    Lisinopril Edema       No current facility-administered medications on file prior to encounter.     Current Outpatient Medications on File Prior to Encounter   Medication Sig    acetaminophen (TYLENOL) 325 MG tablet Take 325 mg by mouth every 6 (six) hours as needed for Pain. 2 tablets  (650 mg)    acetaminophen (TYLENOL) 650 MG Supp Place 650 mg rectally every 8 (eight) hours as needed (for discomfort).    albuterol-ipratropium (DUO-NEB) 2.5 mg-0.5 mg/3 mL nebulizer solution Take 3 mLs by nebulization every 6 (six) hours as needed for Wheezing. Rescue    benzonatate (TESSALON) 200 MG capsule Take 200 mg by mouth 3 (three) times daily as needed for Cough.    gabapentin (NEURONTIN) 100 MG capsule TAKE 1 CAPSULE BY MOUTH TWICE DAILY    hydrALAZINE (APRESOLINE) 25 MG tablet Take 25 mg by mouth every 12 (twelve) hours as needed.    HYDROcodone-acetaminophen (NORCO)  mg per tablet Take 1 tablet by mouth every 8 (eight) hours as needed for Pain.    hyoscyamine (ANASPAZ,LEVSIN) 0.125 mg Tab Take 125 mcg by mouth every 4 (four) hours as needed (PRN secretions).    insulin lispro 100 unit/mL injection Inject into the skin 2 (two) times a day. Sliding scale:  <60 give OJ and repeat blood sugar in 15 minutes   = 0 units  151-175 = 3 units  176-200 = 5  units  201-250 = 7 units  251-300 = 10 units  301-350 = 13 units  351-400 = 16 units  > 400 = 20 units and call MD    lactulose (CHRONULAC) 10 gram/15 mL solution Take by mouth 4 (four) times daily. Every six (6) hours as needed for constipation    LORazepam (ATIVAN) 2 mg/mL injection Inject 1 mg into the muscle every 4 (four) hours as needed (anxiety).    melatonin (MELATIN) 3 mg tablet Take 2 tablets (6 mg total) by mouth nightly as needed for Insomnia.    mirtazapine (REMERON) 15 MG tablet Take 15 mg by mouth every evening.    montelukast (SINGULAIR) 10 mg tablet Take 10 mg by mouth every evening.    morphine 100 mg/5 mL (20 mg/mL) concentrated solution Take 10 mg by mouth every 4 (four) hours as needed for Pain.    ondansetron (ZOFRAN ODT ORAL) Take 4 mg by mouth 4 (four) times daily as needed (Nausea Vomiting).    pantoprazole (PROTONIX) 40 MG tablet TAKE 1 TABLET BY MOUTH TWICE DAILY    paroxetine (PAXIL) 20 MG tablet Take 20 mg by mouth every morning.    sucralfate (CARAFATE) 1 gram tablet Take 1 g by mouth 4 (four) times daily.    tiZANidine 4 mg Cap Take 4 mg by mouth every 8 (eight) hours as needed.    aluminum & magnesium hydroxide-simethicone (MYLANTA MAXIMUM STRENGTH) 400-400-40 mg/5 mL suspension Take 10 mLs by mouth every 4 (four) hours as needed for Indigestion.    budesonide (PULMICORT) 0.5 mg/2 mL nebulizer solution Take 2 mLs (0.5 mg total) by nebulization 2 (two) times a day. Controller     Family History     Problem Relation (Age of Onset)    Cancer Maternal Grandmother, Paternal Grandfather    Diabetes Mother    Heart attack Father    Hypertension Mother    Migraines Mother, Paternal Aunt, Son    Stroke Paternal Aunt    Thyroid disease Mother        Tobacco Use    Smoking status: Former Smoker     Quit date: 2005     Years since quittin.9    Smokeless tobacco: Not on file   Substance and Sexual Activity    Alcohol use: No    Drug use: No    Sexual activity: Not  Currently     Partners: Male     Birth control/protection: Surgical, Post-menopausal     Comment:      Review of Systems   Unable to perform ROS: Mental status change     Objective:     Vital Signs (Most Recent):  Temp: 97.5 °F (36.4 °C) (12/22/21 0733)  Pulse: 62 (12/22/21 0733)  Resp: 18 (12/22/21 0733)  BP: 137/67 (12/22/21 0733)  SpO2: 99 % (12/22/21 0733) Vital Signs (24h Range):  Temp:  [97.5 °F (36.4 °C)-98.6 °F (37 °C)] 97.5 °F (36.4 °C)  Pulse:  [62-87] 62  Resp:  [18-20] 18  SpO2:  [88 %-99 %] 99 %  BP: (128-186)/(62-83) 137/67     Weight: 90.2 kg (198 lb 14.4 oz)  Body mass index is 32.1 kg/m².    Physical Exam  Vitals and nursing note reviewed.   Constitutional:       Appearance: Normal appearance. She is ill-appearing.   HENT:      Head: Normocephalic and atraumatic.      Nose: Nose normal.      Mouth/Throat:      Mouth: Mucous membranes are dry.   Eyes:      Extraocular Movements: Extraocular movements intact.      Pupils: Pupils are equal, round, and reactive to light.   Cardiovascular:      Rate and Rhythm: Regular rhythm. Tachycardia present.      Heart sounds: No murmur heard.  No friction rub. No gallop.    Pulmonary:      Effort: Pulmonary effort is normal.      Breath sounds: Rhonchi present.   Abdominal:      General: Abdomen is flat. Bowel sounds are normal.      Palpations: Abdomen is soft.   Musculoskeletal:         General: No swelling or deformity.      Cervical back: Normal range of motion and neck supple.   Skin:     General: Skin is warm and dry.      Capillary Refill: Capillary refill takes less than 2 seconds.      Coloration: Skin is pale.   Neurological:      General: No focal deficit present.      Mental Status: She is alert.      Comments: lethargic   Psychiatric:         Mood and Affect: Mood normal.         Behavior: Behavior normal.           CRANIAL NERVES     CN III, IV, VI   Pupils are equal, round, and reactive to light.       Significant Labs: All pertinent labs  within the past 24 hours have been reviewed.    Significant Imaging: I have reviewed all pertinent imaging results/findings within the past 24 hours.      Assessment/Plan:      Pericardial effusion  This is mild and does not appear to be causing any tamponade.  Continue to monitor.      Encephalopathy, metabolic  Secondary to uremia and acute kidney failure.  Continue to monitor with IV resuscitation      Multifocal pneumonia  Noted by x-ray.  Continue current antimicrobial regimen which will include meropenem and vancomycin given the patient's severity of illness.  Will tailored based on culture and sensitivity.      Hyperlipidemia  Continue statin      Obesity  Recommendations: Stay Active. As tolerated alternate resistance training with stretching and cardio. Goal of 150 minutes per week or 7,500 - 10,000 steps per day. Follow the Mediterranean Diet. Include fruit, vegetables, olive oil, seeds, nuts, whole grains, cold water fish and lean cuts of meat.  Do not drink beverages with any caloric content.  Decrease portion sizes slightly which will result in an approximately 500-calorie deficit.  Consider substituting one meal a day with a meal replacement such as Slim fast, lean cuisine, or weight watcher's.  Avoid fast or fried food.        Anemia  Stable no active signs of bleeding  12/20:  S/p 1 unit PRBC    Recent Labs   Lab 06/24/21  0357 12/20/21  0923 12/21/21  0303   Hemoglobin 11.7 L 7.0 L 7.5 L         Diabetes mellitus, type 2  Last A1c reviewed-   Lab Results   Component Value Date    HGBA1C 5.3 06/20/2021     Most recent fingerstick glucose reviewed-   Recent Labs   Lab 12/21/21  1909 12/22/21  0422   POCTGLUCOSE 96 77     Current correctional scale:  Low  Anti-hyperglycemic dose as follows-   Antihyperglycemics (From admission, onward)            None        Hold Oral hypoglycemics while patient is in the hospital.        UTI (urinary tract infection)  Continue vancomycin and meropenem will tailored  based on culture and sensitivities.      Acute renal failure  Suspect prerenal azotemia due to severe dehydration.  Continue to monitor with volume resuscitation.  Yancey catheter has been placed for some retention    Lab Results   Component Value Date    CREATININE 4.0 (H) 12/21/2021    CREATININE 4.6 (H) 12/20/2021    CREATININE 2.9 (H) 06/24/2021              VTE Risk Mitigation (From admission, onward)         Ordered     IP VTE HIGH RISK PATIENT  Once         12/20/21 1200     Place sequential compression device  Until discontinued         12/20/21 1200                Discharge Planning   MARCELLE:      Code Status: DNR   Is the patient medically ready for discharge?:     Reason for patient still in hospital (select all that apply): Treatment  Discharge Plan A: New Nursing Home placement - care home care facility                  Osmany Allison Jr, MD  Department of Hospital Medicine   [facility]

## 2021-12-22 NOTE — ASSESSMENT & PLAN NOTE
Last A1c reviewed-   Lab Results   Component Value Date    HGBA1C 5.3 06/20/2021     Most recent fingerstick glucose reviewed-   Recent Labs   Lab 12/21/21  1909 12/22/21  0422   POCTGLUCOSE 96 77     Current correctional scale:  Low  Anti-hyperglycemic dose as follows-   Antihyperglycemics (From admission, onward)            None        Hold Oral hypoglycemics while patient is in the hospital.

## 2021-12-22 NOTE — PROGRESS NOTES
Pharmacist Renal Dose Adjustment Note    Smiley Harmon is a 81 y.o. female being treated with the medication Merrem.     Patient Data:    Vital Signs (Most Recent):  Temp: 97.5 °F (36.4 °C) (12/22/21 0733)  Pulse: 62 (12/22/21 0733)  Resp: 18 (12/22/21 0733)  BP: 137/67 (12/22/21 0733)  SpO2: 99 % (12/22/21 0733) Vital Signs (72h Range):  Temp:  [97.1 °F (36.2 °C)-100 °F (37.8 °C)]   Pulse:  []   Resp:  [5-20]   BP: ()/(55-90)   SpO2:  [88 %-100 %]      Recent Labs   Lab 12/20/21  0923 12/21/21  0303 12/22/21 0936   CREATININE 4.6* 4.0* 3.6*     Serum creatinine: 3.6 mg/dL (H) 12/22/21 0936  Estimated creatinine clearance: 14.3 mL/min (A)    Medication:Merrem dose: 1gram frequency every 12 hours will be changed to medication:Merrem dose:500mg frequency:every 12 hours.     Pharmacist's Name: SUHAIL KING  Pharmacist's Extension: [phone]

## 2021-12-22 NOTE — PROGRESS NOTES
Pharmacokinetic Assessment Follow Up: IV Vancomycin    Vancomycin serum concentration assessment(s):    The random level was drawn correctly and can be used to guide therapy at this time. The measurement is within the desired definitive target range of 15 to 20 mcg/mL.    Vancomycin Regimen Plan:    Re-dosed with Vancomycin 1250mg ( 15mg/kg ) x 1 dose.      Re-dose when the random level is less than 20 mcg/mL, next level to be drawn at 0430 on 12/23/21    Drug levels (last 3 results):  Recent Labs   Lab Result Units 12/21/21  1002   Vancomycin, Random ug/mL 16.4       Pharmacy will continue to follow and monitor vancomycin.    Please contact pharmacy at extension [phone] for questions regarding this assessment.    Thank you for the consult,   SUHAIL KING       Patient brief summary:  Smiley Harmon is a 81 y.o. female initiated on antimicrobial therapy with IV Vancomycin for treatment of  pneumonia.     The patient's current regimen is pulse dosed.     Drug Allergies:   Review of patient's allergies indicates:   Allergen Reactions    Motrin [ibuprofen] Other (See Comments)     Feels like heart is swelling    Penicillins Hives and Itching    Sulfa (sulfonamide antibiotics) Nausea And Vomiting    Iodine and iodide containing products Edema    Lisinopril Edema       Actual Body Weight:   90.2kg     Renal Function:   Estimated Creatinine Clearance: 12.9 mL/min (A) (based on SCr of 4 mg/dL (H)).,     Dialysis Method (if applicable):  N/A    CBC (last 72 hours):  Recent Labs   Lab Result Units 12/20/21  0923 12/21/21  0303   WBC K/uL 9.23 9.93   Hemoglobin g/dL 7.0* 7.5*   Hematocrit % 24.3* 24.8*   Platelets K/uL 296 259   Gran % % 72.3 80.5*   Lymph % % 11.6* 6.4*   Mono % % 9.8 10.2   Eosinophil % % 5.2 2.1   Basophil % % 0.7 0.4   Differential Method  Automated Automated       Metabolic Panel (last 72 hours):  Recent Labs   Lab Result Units 12/20/21  0923 12/20/21  1100 12/21/21  0303   Sodium mmol/L 139  --  140    Potassium mmol/L 4.0  --  3.9   Chloride mmol/L 104  --  106   CO2 mmol/L 29  --  28   Glucose mg/dL 87  --  87   Glucose, UA   --  Negative  --    BUN mg/dL 35*  --  35*   Creatinine mg/dL 4.6*  --  4.0*   Creatinine, Urine mg/dL  --  103.0  --    Albumin g/dL 2.9*  --  2.3*   Total Bilirubin mg/dL 0.2  --  0.3   Alkaline Phosphatase U/L 82  --  69   AST U/L 18  --  13   ALT U/L 11  --  8*   Magnesium mg/dL  --   --  1.8       Vancomycin Administrations:  vancomycin given in the last 96 hours                     vancomycin 1.5 g in dextrose 5 % 250 mL IVPB (ready to mix) (mg) 1,500 mg New Bag 12/20/21 4162                    Microbiologic Results:  Microbiology Results (last 7 days)       Procedure Component Value Units Date/Time    Blood culture x two cultures. Draw prior to antibiotics. [[id number]] Collected: 12/20/21 0923    Order Status: Completed Specimen: Blood Updated: 12/21/21 1812     Blood Culture, Routine No Growth to date      No Growth to date    Narrative:      Aerobic and anaerobic    Blood culture x two cultures. Draw prior to antibiotics. [[id number]] Collected: 12/20/21 0931    Order Status: Completed Specimen: Blood Updated: 12/21/21 1812     Blood Culture, Routine No Growth to date      No Growth to date    Narrative:      Aerobic and anaerobic    Urine culture [[id number]]  (Abnormal) Collected: 12/20/21 1100    Order Status: Completed Specimen: Urine Updated: 12/21/21 1641     Urine Culture, Routine GRAM NEGATIVE RAVI  >100,000 cfu/ml  Identification and susceptibility pending      Narrative:      Preferred Collection Type->Urine, Clean Catch  Specimen Source->Urine

## 2021-12-23 NOTE — ASSESSMENT & PLAN NOTE
Last A1c reviewed-   Lab Results   Component Value Date    HGBA1C 5.3 06/20/2021     Most recent fingerstick glucose reviewed-   Recent Labs   Lab 12/22/21  1918 12/23/21  0426   POCTGLUCOSE 104 75     Current correctional scale:  Low  Anti-hyperglycemic dose as follows-   Antihyperglycemics (From admission, onward)            None        Hold Oral hypoglycemics while patient is in the hospital.

## 2021-12-23 NOTE — ASSESSMENT & PLAN NOTE
Suspect prerenal azotemia due to severe dehydration.  Continue to monitor with volume resuscitation.  Yancey catheter has been placed for some retention    Lab Results   Component Value Date    CREATININE 3.3 (H) 12/23/2021    CREATININE 3.6 (H) 12/22/2021    CREATININE 4.0 (H) 12/21/2021

## 2021-12-23 NOTE — PROGRESS NOTES
[facility] Medicine  Progress Note    Patient Name: Smiley Harmon  MRN: [id number]  Patient Class: IP- Inpatient   Admission Date: 12/20/2021  Length of Stay: 3 days  Attending Physician: Osmany Allison Jr., MD  Primary Care Provider: Osmany Allison Jr, MD        Subjective:     Principal Problem:<principal problem not specified>        HPI:  Chief Complaint   Patient presents with    Altered Mental Status       Per nursing home client not acting her normal self. Had low O2 sats and a breathing treatment was given at the home.           History Provided By: EMS     0839   Smiley Harmon is a 81 y.o. female with PMHX of  dementia, CVA, CAD, mi, hypertension, hyperlipidemia, who presents to the emergency department C/O altered mental status.     Patient transferred from nursing home due to altered mental status.  Per EMS patient was noted to be altered this morning.  Normally has some orientation and is interactive.  This morning patient less responsive.  Was reportedly 80% on room air.  Patient is awake but provides no history.  She is in hospice but apparently is full code.       PCP: Osmany Allison Jr, MD          Overview/Hospital Course:  12/22: Patient resting comfortably this morning.  No acute events overnight.  A.m. labs are pending.  12/23:  Patient more alert this am. Resting comfortable asking for breakfast.      Past Medical History:   Diagnosis Date    Blood disorder     Clostridium difficile infection 12/30/2020    Colitis     Coronary artery disease     Depression     Diabetes mellitus     Dizziness 3/26/2021    GERD (gastroesophageal reflux disease)     H/O heart artery stent     Heart disease, unspecified     Hyperlipidemia     Hypertension     Memory loss     Mouth ulceration 1/8/2021    Myocardial infarct     Renal disorder     CKD    Sepsis 2/9/2021    Septic shock 12/19/2020    Stomach ulcer     Stroke     Thyroid disease     UTI (urinary  tract infection) 2020       Past Surgical History:   Procedure Laterality Date    APPENDECTOMY      BILATERAL SALPINGOOPHORECTOMY       SECTION      CHOLECYSTECTOMY      CORONARY ANGIOPLASTY WITH STENT PLACEMENT      DILATION AND CURETTAGE OF UTERUS      ELBOW SURGERY      HERNIA REPAIR      abdominal    HIP SURGERY      HYSTERECTOMY  age 27    bleeding    HYSTERECTOMY      KNEE SURGERY      SHOULDER OPEN ROTATOR CUFF REPAIR         Review of patient's allergies indicates:   Allergen Reactions    Motrin [ibuprofen] Other (See Comments)     Feels like heart is swelling    Penicillins Hives and Itching    Sulfa (sulfonamide antibiotics) Nausea And Vomiting    Iodine and iodide containing products Edema    Lisinopril Edema       No current facility-administered medications on file prior to encounter.     Current Outpatient Medications on File Prior to Encounter   Medication Sig    acetaminophen (TYLENOL) 325 MG tablet Take 325 mg by mouth every 6 (six) hours as needed for Pain. 2 tablets  (650 mg)    acetaminophen (TYLENOL) 650 MG Supp Place 650 mg rectally every 8 (eight) hours as needed (for discomfort).    albuterol-ipratropium (DUO-NEB) 2.5 mg-0.5 mg/3 mL nebulizer solution Take 3 mLs by nebulization every 6 (six) hours as needed for Wheezing. Rescue    benzonatate (TESSALON) 200 MG capsule Take 200 mg by mouth 3 (three) times daily as needed for Cough.    gabapentin (NEURONTIN) 100 MG capsule TAKE 1 CAPSULE BY MOUTH TWICE DAILY    hydrALAZINE (APRESOLINE) 25 MG tablet Take 25 mg by mouth every 12 (twelve) hours as needed.    HYDROcodone-acetaminophen (NORCO)  mg per tablet Take 1 tablet by mouth every 8 (eight) hours as needed for Pain.    hyoscyamine (ANASPAZ,LEVSIN) 0.125 mg Tab Take 125 mcg by mouth every 4 (four) hours as needed (PRN secretions).    insulin lispro 100 unit/mL injection Inject into the skin 2 (two) times a day. Sliding scale:  <60 give OJ and  repeat blood sugar in 15 minutes   = 0 units  151-175 = 3 units  176-200 = 5 units  201-250 = 7 units  251-300 = 10 units  301-350 = 13 units  351-400 = 16 units  > 400 = 20 units and call MD    lactulose (CHRONULAC) 10 gram/15 mL solution Take by mouth 4 (four) times daily. Every six (6) hours as needed for constipation    LORazepam (ATIVAN) 2 mg/mL injection Inject 1 mg into the muscle every 4 (four) hours as needed (anxiety).    melatonin (MELATIN) 3 mg tablet Take 2 tablets (6 mg total) by mouth nightly as needed for Insomnia.    mirtazapine (REMERON) 15 MG tablet Take 15 mg by mouth every evening.    montelukast (SINGULAIR) 10 mg tablet Take 10 mg by mouth every evening.    morphine 100 mg/5 mL (20 mg/mL) concentrated solution Take 10 mg by mouth every 4 (four) hours as needed for Pain.    ondansetron (ZOFRAN ODT ORAL) Take 4 mg by mouth 4 (four) times daily as needed (Nausea Vomiting).    pantoprazole (PROTONIX) 40 MG tablet TAKE 1 TABLET BY MOUTH TWICE DAILY    paroxetine (PAXIL) 20 MG tablet Take 20 mg by mouth every morning.    sucralfate (CARAFATE) 1 gram tablet Take 1 g by mouth 4 (four) times daily.    tiZANidine 4 mg Cap Take 4 mg by mouth every 8 (eight) hours as needed.    aluminum & magnesium hydroxide-simethicone (MYLANTA MAXIMUM STRENGTH) 400-400-40 mg/5 mL suspension Take 10 mLs by mouth every 4 (four) hours as needed for Indigestion.    budesonide (PULMICORT) 0.5 mg/2 mL nebulizer solution Take 2 mLs (0.5 mg total) by nebulization 2 (two) times a day. Controller     Family History     Problem Relation (Age of Onset)    Cancer Maternal Grandmother, Paternal Grandfather    Diabetes Mother    Heart attack Father    Hypertension Mother    Migraines Mother, Paternal Aunt, Son    Stroke Paternal Aunt    Thyroid disease Mother        Tobacco Use    Smoking status: Former Smoker     Quit date: 2005     Years since quittin.9    Smokeless tobacco: Not on file   Substance and  Sexual Activity    Alcohol use: No    Drug use: No    Sexual activity: Not Currently     Partners: Male     Birth control/protection: Surgical, Post-menopausal     Comment:      Review of Systems   Unable to perform ROS: Mental status change     Objective:     Vital Signs (Most Recent):  Temp: 97.9 °F (36.6 °C) (12/23/21 1115)  Pulse: 68 (12/23/21 1115)  Resp: 18 (12/23/21 1115)  BP: (!) 179/99 (12/23/21 1115)  SpO2: 96 % (12/23/21 1115) Vital Signs (24h Range):  Temp:  [97.8 °F (36.6 °C)-98.1 °F (36.7 °C)] 97.9 °F (36.6 °C)  Pulse:  [68-79] 68  Resp:  [18-20] 18  SpO2:  [92 %-96 %] 96 %  BP: (134-181)/(77-99) 179/99     Weight: 90.2 kg (198 lb 14.4 oz)  Body mass index is 32.1 kg/m².    Physical Exam  Vitals and nursing note reviewed.   Constitutional:       Appearance: Normal appearance. She is ill-appearing.   HENT:      Head: Normocephalic and atraumatic.      Nose: Nose normal.      Mouth/Throat:      Mouth: Mucous membranes are dry.   Eyes:      Extraocular Movements: Extraocular movements intact.      Pupils: Pupils are equal, round, and reactive to light.   Cardiovascular:      Rate and Rhythm: Regular rhythm. Tachycardia present.      Heart sounds: No murmur heard.  No friction rub. No gallop.    Pulmonary:      Effort: Pulmonary effort is normal.      Breath sounds: Rhonchi present.   Abdominal:      General: Abdomen is flat. Bowel sounds are normal.      Palpations: Abdomen is soft.   Musculoskeletal:         General: No swelling or deformity.      Cervical back: Normal range of motion and neck supple.   Skin:     General: Skin is warm and dry.      Capillary Refill: Capillary refill takes less than 2 seconds.      Coloration: Skin is pale.   Neurological:      General: No focal deficit present.      Mental Status: She is alert.      Comments: lethargic   Psychiatric:         Mood and Affect: Mood normal.         Behavior: Behavior normal.           CRANIAL NERVES     CN III, IV, VI   Pupils  are equal, round, and reactive to light.       Significant Labs: All pertinent labs within the past 24 hours have been reviewed.    Significant Imaging: I have reviewed all pertinent imaging results/findings within the past 24 hours.      Assessment/Plan:      Pericardial effusion  This is mild and does not appear to be causing any tamponade.  Continue to monitor.      Encephalopathy, metabolic  Secondary to uremia and acute kidney failure.  Continue to monitor with IV resuscitation      Multifocal pneumonia  Noted by x-ray.  Continue current antimicrobial regimen which will include meropenem and vancomycin given the patient's severity of illness.  Will tailored based on culture and sensitivity.    deescalate to merrem monotherapy.  Patient with multiple allergies.       Hyperlipidemia  Continue statin      Obesity  Recommendations: Stay Active. As tolerated alternate resistance training with stretching and cardio. Goal of 150 minutes per week or 7,500 - 10,000 steps per day. Follow the Mediterranean Diet. Include fruit, vegetables, olive oil, seeds, nuts, whole grains, cold water fish and lean cuts of meat.  Do not drink beverages with any caloric content.  Decrease portion sizes slightly which will result in an approximately 500-calorie deficit.  Consider substituting one meal a day with a meal replacement such as Slim fast, lean cuisine, or weight watcher's.  Avoid fast or fried food.        Anemia  Stable no active signs of bleeding  12/20:  S/p 1 unit PRBC    Recent Labs   Lab 12/21/21  0303 12/22/21  0936 12/23/21  0505   Hemoglobin 7.5 L 7.3 L 7.5 L         Diabetes mellitus, type 2  Last A1c reviewed-   Lab Results   Component Value Date    HGBA1C 5.3 06/20/2021     Most recent fingerstick glucose reviewed-   Recent Labs   Lab 12/22/21  1918 12/23/21  0426   POCTGLUCOSE 104 75     Current correctional scale:  Low  Anti-hyperglycemic dose as follows-   Antihyperglycemics (From admission, onward)             None        Hold Oral hypoglycemics while patient is in the hospital.        UTI (urinary tract infection)  Continue vancomycin and meropenem will tailored based on culture and sensitivities.    E coli sensitive to Merrem.       Acute renal failure  Suspect prerenal azotemia due to severe dehydration.  Continue to monitor with volume resuscitation.  Yancey catheter has been placed for some retention    Lab Results   Component Value Date    CREATININE 3.3 (H) 12/23/2021    CREATININE 3.6 (H) 12/22/2021    CREATININE 4.0 (H) 12/21/2021              VTE Risk Mitigation (From admission, onward)         Ordered     IP VTE HIGH RISK PATIENT  Once         12/20/21 1200     Place sequential compression device  Until discontinued         12/20/21 1200                Discharge Planning   MARCELLE:      Code Status: DNR   Is the patient medically ready for discharge?:     Reason for patient still in hospital (select all that apply): Treatment  Discharge Plan A: New Nursing Home placement - [facility] care facility                  Osmany Allison Jr, MD  Department of Hospital Medicine   [facility]

## 2021-12-23 NOTE — PROGRESS NOTES
Pharmacist Renal Dose Adjustment Note    Smiley Harmon is a 81 y.o. female being treated with the medication cefuroxime.    Patient Data:    Vital Signs (Most Recent):  Temp: 97.9 °F (36.6 °C) (12/23/21 1115)  Pulse: 68 (12/23/21 1115)  Resp: 18 (12/23/21 1115)  BP: (!) 179/99 (12/23/21 1115)  SpO2: 96 % (12/23/21 1115)   Vital Signs (72h Range):  Temp:  [97.1 °F (36.2 °C)-100 °F (37.8 °C)]   Pulse:  []   Resp:  [6-20]   BP: ()/(55-99)   SpO2:  [88 %-100 %]      Recent Labs   Lab 12/21/21  0303 12/22/21  0936 12/23/21  0505   CREATININE 4.0* 3.6* 3.3*     Serum creatinine: 3.3 mg/dL (H) 12/23/21 0505  Estimated creatinine clearance: 15.6 mL/min (A)    Medication:cefuroxime dose: 500 mg frequency BID will be changed to medication:cefuroxime dose:500 mg  frequency:daily    Pharmacist's Name: Dorina Camacho  Pharmacist's Extension: [phone]

## 2021-12-23 NOTE — ASSESSMENT & PLAN NOTE
Stable no active signs of bleeding  12/20:  S/p 1 unit PRBC    Recent Labs   Lab 12/21/21  0303 12/22/21  0936 12/23/21  0505   Hemoglobin 7.5 L 7.3 L 7.5 L

## 2021-12-23 NOTE — SUBJECTIVE & OBJECTIVE
Past Medical History:   Diagnosis Date    Blood disorder     Clostridium difficile infection 2020    Colitis     Coronary artery disease     Depression     Diabetes mellitus     Dizziness 3/26/2021    GERD (gastroesophageal reflux disease)     H/O heart artery stent     Heart disease, unspecified     Hyperlipidemia     Hypertension     Memory loss     Mouth ulceration 2021    Myocardial infarct     Renal disorder     CKD    Sepsis 2021    Septic shock 2020    Stomach ulcer     Stroke     Thyroid disease     UTI (urinary tract infection) 2020       Past Surgical History:   Procedure Laterality Date    APPENDECTOMY      BILATERAL SALPINGOOPHORECTOMY       SECTION      CHOLECYSTECTOMY      CORONARY ANGIOPLASTY WITH STENT PLACEMENT      DILATION AND CURETTAGE OF UTERUS      ELBOW SURGERY      HERNIA REPAIR      abdominal    HIP SURGERY      HYSTERECTOMY  age 27    bleeding    HYSTERECTOMY      KNEE SURGERY      SHOULDER OPEN ROTATOR CUFF REPAIR         Review of patient's allergies indicates:   Allergen Reactions    Motrin [ibuprofen] Other (See Comments)     Feels like heart is swelling    Penicillins Hives and Itching    Sulfa (sulfonamide antibiotics) Nausea And Vomiting    Iodine and iodide containing products Edema    Lisinopril Edema       No current facility-administered medications on file prior to encounter.     Current Outpatient Medications on File Prior to Encounter   Medication Sig    acetaminophen (TYLENOL) 325 MG tablet Take 325 mg by mouth every 6 (six) hours as needed for Pain. 2 tablets  (650 mg)    acetaminophen (TYLENOL) 650 MG Supp Place 650 mg rectally every 8 (eight) hours as needed (for discomfort).    albuterol-ipratropium (DUO-NEB) 2.5 mg-0.5 mg/3 mL nebulizer solution Take 3 mLs by nebulization every 6 (six) hours as needed for Wheezing. Rescue    benzonatate (TESSALON) 200 MG capsule Take 200 mg by mouth 3 (three)  times daily as needed for Cough.    gabapentin (NEURONTIN) 100 MG capsule TAKE 1 CAPSULE BY MOUTH TWICE DAILY    hydrALAZINE (APRESOLINE) 25 MG tablet Take 25 mg by mouth every 12 (twelve) hours as needed.    HYDROcodone-acetaminophen (NORCO)  mg per tablet Take 1 tablet by mouth every 8 (eight) hours as needed for Pain.    hyoscyamine (ANASPAZ,LEVSIN) 0.125 mg Tab Take 125 mcg by mouth every 4 (four) hours as needed (PRN secretions).    insulin lispro 100 unit/mL injection Inject into the skin 2 (two) times a day. Sliding scale:  <60 give OJ and repeat blood sugar in 15 minutes   = 0 units  151-175 = 3 units  176-200 = 5 units  201-250 = 7 units  251-300 = 10 units  301-350 = 13 units  351-400 = 16 units  > 400 = 20 units and call MD    lactulose (CHRONULAC) 10 gram/15 mL solution Take by mouth 4 (four) times daily. Every six (6) hours as needed for constipation    LORazepam (ATIVAN) 2 mg/mL injection Inject 1 mg into the muscle every 4 (four) hours as needed (anxiety).    melatonin (MELATIN) 3 mg tablet Take 2 tablets (6 mg total) by mouth nightly as needed for Insomnia.    mirtazapine (REMERON) 15 MG tablet Take 15 mg by mouth every evening.    montelukast (SINGULAIR) 10 mg tablet Take 10 mg by mouth every evening.    morphine 100 mg/5 mL (20 mg/mL) concentrated solution Take 10 mg by mouth every 4 (four) hours as needed for Pain.    ondansetron (ZOFRAN ODT ORAL) Take 4 mg by mouth 4 (four) times daily as needed (Nausea Vomiting).    pantoprazole (PROTONIX) 40 MG tablet TAKE 1 TABLET BY MOUTH TWICE DAILY    paroxetine (PAXIL) 20 MG tablet Take 20 mg by mouth every morning.    sucralfate (CARAFATE) 1 gram tablet Take 1 g by mouth 4 (four) times daily.    tiZANidine 4 mg Cap Take 4 mg by mouth every 8 (eight) hours as needed.    aluminum & magnesium hydroxide-simethicone (MYLANTA MAXIMUM STRENGTH) 400-400-40 mg/5 mL suspension Take 10 mLs by mouth every 4 (four) hours as needed for  Indigestion.    budesonide (PULMICORT) 0.5 mg/2 mL nebulizer solution Take 2 mLs (0.5 mg total) by nebulization 2 (two) times a day. Controller     Family History     Problem Relation (Age of Onset)    Cancer Maternal Grandmother, Paternal Grandfather    Diabetes Mother    Heart attack Father    Hypertension Mother    Migraines Mother, Paternal Aunt, Son    Stroke Paternal Aunt    Thyroid disease Mother        Tobacco Use    Smoking status: Former Smoker     Quit date: 2005     Years since quittin.9    Smokeless tobacco: Not on file   Substance and Sexual Activity    Alcohol use: No    Drug use: No    Sexual activity: Not Currently     Partners: Male     Birth control/protection: Surgical, Post-menopausal     Comment:      Review of Systems   Unable to perform ROS: Mental status change     Objective:     Vital Signs (Most Recent):  Temp: 97.9 °F (36.6 °C) (21 1115)  Pulse: 68 (21 1115)  Resp: 18 (21 1115)  BP: (!) 179/99 (21 1115)  SpO2: 96 % (21 1115) Vital Signs (24h Range):  Temp:  [97.8 °F (36.6 °C)-98.1 °F (36.7 °C)] 97.9 °F (36.6 °C)  Pulse:  [68-79] 68  Resp:  [18-20] 18  SpO2:  [92 %-96 %] 96 %  BP: (134-181)/(77-99) 179/99     Weight: 90.2 kg (198 lb 14.4 oz)  Body mass index is 32.1 kg/m².    Physical Exam  Vitals and nursing note reviewed.   Constitutional:       Appearance: Normal appearance. She is ill-appearing.   HENT:      Head: Normocephalic and atraumatic.      Nose: Nose normal.      Mouth/Throat:      Mouth: Mucous membranes are dry.   Eyes:      Extraocular Movements: Extraocular movements intact.      Pupils: Pupils are equal, round, and reactive to light.   Cardiovascular:      Rate and Rhythm: Regular rhythm. Tachycardia present.      Heart sounds: No murmur heard.  No friction rub. No gallop.    Pulmonary:      Effort: Pulmonary effort is normal.      Breath sounds: Rhonchi present.   Abdominal:      General: Abdomen is flat. Bowel sounds  are normal.      Palpations: Abdomen is soft.   Musculoskeletal:         General: No swelling or deformity.      Cervical back: Normal range of motion and neck supple.   Skin:     General: Skin is warm and dry.      Capillary Refill: Capillary refill takes less than 2 seconds.      Coloration: Skin is pale.   Neurological:      General: No focal deficit present.      Mental Status: She is alert.      Comments: lethargic   Psychiatric:         Mood and Affect: Mood normal.         Behavior: Behavior normal.           CRANIAL NERVES     CN III, IV, VI   Pupils are equal, round, and reactive to light.       Significant Labs: All pertinent labs within the past 24 hours have been reviewed.    Significant Imaging: I have reviewed all pertinent imaging results/findings within the past 24 hours.

## 2021-12-23 NOTE — ASSESSMENT & PLAN NOTE
Continue vancomycin and meropenem will tailored based on culture and sensitivities.    E coli sensitive to Merrem.

## 2021-12-23 NOTE — PROGRESS NOTES
Pharmacokinetic Assessment Follow Up: IV Vancomycin    Vancomycin serum concentration assessment(s):    The random level was drawn correctly and can be used to guide therapy at this time. The measurement is above the desired definitive target range of 15 to 20 mcg/mL.    Vancomycin Regimen Plan:    Re-dose when the random level is less than 20 mcg/mL, next level to be drawn at with AM labs  on 12/24    Drug levels (last 3 results):  Recent Labs   Lab Result Units 12/21/21  1002 12/23/21  0505   Vancomycin, Random ug/mL 16.4 22.4       Pharmacy will continue to follow and monitor vancomycin.    Please contact pharmacy at extension [phone] for questions regarding this assessment.    Thank you for the consult,   Dorina Camacho       Patient brief summary:  Smiley Harmon is a 81 y.o. female initiated on antimicrobial therapy with IV Vancomycin for treatment of  pneumonia    The patient's current regimen is pulse dosing due to poor renal funcrtion    Drug Allergies:   Review of patient's allergies indicates:   Allergen Reactions    Motrin [ibuprofen] Other (See Comments)     Feels like heart is swelling    Penicillins Hives and Itching    Sulfa (sulfonamide antibiotics) Nausea And Vomiting    Iodine and iodide containing products Edema    Lisinopril Edema       Actual Body Weight:   90.2 kg    Renal Function:   Estimated Creatinine Clearance: 15.6 mL/min (A) (based on SCr of 3.3 mg/dL (H)).,     Dialysis Method (if applicable):  N/A    CBC (last 72 hours):  Recent Labs   Lab Result Units 12/20/21  0923 12/21/21  0303 12/22/21  0936 12/23/21  0505   WBC K/uL 9.23 9.93 9.86 8.50   Hemoglobin g/dL 7.0* 7.5* 7.3* 7.5*   Hematocrit % 24.3* 24.8* 23.9* 24.4*   Platelets K/uL 296 259 241 277   Gran % % 72.3 80.5*  --  79.0*   Lymph % % 11.6* 6.4*  --  7.8*   Mono % % 9.8 10.2  --  6.2   Eosinophil % % 5.2 2.1  --  5.2   Basophil % % 0.7 0.4  --  0.6   Differential Method  Automated Automated  --  Automated        Metabolic Panel (last 72 hours):  Recent Labs   Lab Result Units 12/20/21  0923 12/20/21  1100 12/21/21  0303 12/22/21  0936 12/23/21  0505   Sodium mmol/L 139  --  140 141 142   Potassium mmol/L 4.0  --  3.9 3.8 3.5   Chloride mmol/L 104  --  106 109 109   CO2 mmol/L 29  --  28 26 25   Glucose mg/dL 87  --  87 90 78   Glucose, UA   --  Negative  --   --   --    BUN mg/dL 35*  --  35* 32* 31*   Creatinine mg/dL 4.6*  --  4.0* 3.6* 3.3*   Creatinine, Urine mg/dL  --  103.0  --   --   --    Albumin g/dL 2.9*  --  2.3* 2.2* 2.3*   Total Bilirubin mg/dL 0.2  --  0.3 0.3 0.3   Alkaline Phosphatase U/L 82  --  69 81 82   AST U/L 18  --  13 20 15   ALT U/L 11  --  8* 10 9*   Magnesium mg/dL  --   --  1.8  --  1.8       Vancomycin Administrations:  vancomycin given in the last 96 hours                     vancomycin 1.25 g in dextrose 5% 250 mL IVPB (ready to mix) (mg) 1,250 mg New Bag 12/22/21 0906    vancomycin 1.5 g in dextrose 5 % 250 mL IVPB (ready to mix) (mg) 1,500 mg New Bag 12/20/21 2143                    Microbiologic Results:  Microbiology Results (last 7 days)       Procedure Component Value Units Date/Time    Urine culture [[id number]]  (Abnormal)  (Susceptibility) Collected: 12/20/21 1100    Order Status: Completed Specimen: Urine Updated: 12/22/21 2149     Urine Culture, Routine ESCHERICHIA COLI  >100,000 cfu/ml      Narrative:      Preferred Collection Type->Urine, Clean Catch  Specimen Source->Urine    Blood culture x two cultures. Draw prior to antibiotics. [[id number]] Collected: 12/20/21 0923    Order Status: Completed Specimen: Blood Updated: 12/22/21 1812     Blood Culture, Routine No Growth to date      No Growth to date      No Growth to date    Narrative:      Aerobic and anaerobic    Blood culture x two cultures. Draw prior to antibiotics. [[id number]] Collected: 12/20/21 0931    Order Status: Completed Specimen: Blood Updated: 12/22/21 1812     Blood Culture, Routine No Growth to date      No  Growth to date      No Growth to date    Narrative:      Aerobic and anaerobic

## 2021-12-25 NOTE — PROGRESS NOTES
"[facility] Medicine  Progress Note    Patient Name: Smiley Harmon  MRN: [id number]  Patient Class: IP- Inpatient   Admission Date: 12/20/2021  Length of Stay: 5 days  Attending Physician: Osmany Allison Jr., MD  Primary Care Provider: Osmany Allison Jr, MD        Subjective:     Principal Problem: AMS and burning with urination      HPI:  Chief Complaint   Patient presents with    Altered Mental Status       Per nursing home client not acting her normal self. Had low O2 sats and a breathing treatment was given at the home.           History Provided By: EMS     0839   Smiley Harmon is a 81 y.o. female with PMHX of  dementia, CVA, CAD, mi, hypertension, hyperlipidemia, who presents to the emergency department C/O altered mental status.     Patient transferred from nursing home due to altered mental status.  Per EMS patient was noted to be altered this morning.  Normally has some orientation and is interactive.  This morning patient less responsive.  Was reportedly 80% on room air.  Patient is awake but provides no history.  She is in hospice but apparently is full code.       PCP: Osmany Allison Jr, MD          Overview/Hospital Course:  12/22: Patient resting comfortably this morning.  No acute events overnight.  A.m. labs are pending.  12/23:  Patient more alert this am. Resting comfortable asking for breakfast.  12/25: Pt is more confused today, says "I do not know if you are really there." Also continues to complain of burning with urination    No new subjective & objective note has been filed under this hospital service since the last note was generated.      Assessment/Plan:      Pericardial effusion  This is mild and does not appear to be causing any tamponade.  Continue to monitor.      Encephalopathy, metabolic  Secondary to uremia and acute kidney failure.  Continue to monitor with IV resuscitation      Multifocal pneumonia  Noted by x-ray.  Continue current antimicrobial " regimen which will include meropenem and vancomycin given the patient's severity of illness.  Will tailored based on culture and sensitivity.    deescalate to merrem monotherapy.  Patient with multiple allergies.       Hyperlipidemia  Continue statin      Obesity  Recommendations: Stay Active. As tolerated alternate resistance training with stretching and cardio. Goal of 150 minutes per week or 7,500 - 10,000 steps per day. Follow the Mediterranean Diet. Include fruit, vegetables, olive oil, seeds, nuts, whole grains, cold water fish and lean cuts of meat.  Do not drink beverages with any caloric content.  Decrease portion sizes slightly which will result in an approximately 500-calorie deficit.  Consider substituting one meal a day with a meal replacement such as Slim fast, lean cuisine, or weight watcher's.  Avoid fast or fried food.        Anemia  Stable no active signs of bleeding  12/20:  S/p 1 unit PRBC    Recent Labs   Lab 12/21/21  0303 12/22/21  0936 12/23/21  0505   Hemoglobin 7.5 L 7.3 L 7.5 L         Diabetes mellitus, type 2  Last A1c reviewed-   Lab Results   Component Value Date    HGBA1C 5.3 06/20/2021     Most recent fingerstick glucose reviewed-   Recent Labs   Lab 12/22/21  1918 12/23/21  0426   POCTGLUCOSE 104 75     Current correctional scale:  Low  Anti-hyperglycemic dose as follows-   Antihyperglycemics (From admission, onward)            None        Hold Oral hypoglycemics while patient is in the hospital.        UTI (urinary tract infection)  Continue vancomycin and meropenem will tailored based on culture and sensitivities.    E coli sensitive to Merrem.       Acute renal failure  Suspect prerenal azotemia due to severe dehydration.  Continue to monitor with volume resuscitation.  Yancey catheter has been placed for some retention    Lab Results   Component Value Date    CREATININE 3.3 (H) 12/23/2021    CREATININE 3.6 (H) 12/22/2021    CREATININE 4.0 (H) 12/21/2021            VTE Risk  Mitigation (From admission, onward)         Ordered     IP VTE HIGH RISK PATIENT  Once         12/20/21 1200     Place sequential compression device  Until discontinued         12/20/21 1200                Discharge Planning   MARCELLE:      Code Status: DNR   Is the patient medically ready for discharge?:     Reason for patient still in hospital (select all that apply): Treatment  Discharge Plan A: [facility]                  Reed Tearn DO  Department of Hospital Medicine   [facility]

## 2021-12-26 NOTE — PROGRESS NOTES
"[facility] Medicine  Progress Note    Patient Name: Smiley Harmon  MRN: [id number]  Patient Class: IP- Inpatient   Admission Date: 12/20/2021  Length of Stay: 6 days  Attending Physician: Osmany Allison Jr., MD  Primary Care Provider: Osmany Allison Jr, MD        Subjective:     Principal Problem: AMS and burning with urination      HPI:  Chief Complaint   Patient presents with    Altered Mental Status       Per nursing home client not acting her normal self. Had low O2 sats and a breathing treatment was given at the home.           History Provided By: EMS     0839   Smiley Harmon is a 81 y.o. female with PMHX of  dementia, CVA, CAD, mi, hypertension, hyperlipidemia, who presents to the emergency department C/O altered mental status.     Patient transferred from nursing home due to altered mental status.  Per EMS patient was noted to be altered this morning.  Normally has some orientation and is interactive.  This morning patient less responsive.  Was reportedly 80% on room air.  Patient is awake but provides no history.  She is in hospice but apparently is full code.       PCP: Osmany Allison Jr, MD          Overview/Hospital Course:  12/22: Patient resting comfortably this morning.  No acute events overnight.  A.m. labs are pending.  12/23:  Patient more alert this am. Resting comfortable asking for breakfast.  12/25: Pt is more confused today, says "I do not know if you are really there." Also continues to complain of burning with urination  12/26: Complaining of abdominal pain in RLQ.     No new subjective & objective note has been filed under this hospital service since the last note was generated.      Assessment/Plan:      Pericardial effusion  This is mild and does not appear to be causing any tamponade.  Continue to monitor.      Encephalopathy, metabolic  Secondary to uremia and acute kidney failure.  Continue to monitor with IV resuscitation      Multifocal " pneumonia  Noted by x-ray.  Continue current antimicrobial regimen which will include meropenem and vancomycin given the patient's severity of illness.  Will tailored based on culture and sensitivity.    deescalate to merrem monotherapy.  Patient with multiple allergies.       Hyperlipidemia  Continue statin      Obesity  Recommendations: Stay Active. As tolerated alternate resistance training with stretching and cardio. Goal of 150 minutes per week or 7,500 - 10,000 steps per day. Follow the Mediterranean Diet. Include fruit, vegetables, olive oil, seeds, nuts, whole grains, cold water fish and lean cuts of meat.  Do not drink beverages with any caloric content.  Decrease portion sizes slightly which will result in an approximately 500-calorie deficit.  Consider substituting one meal a day with a meal replacement such as Slim fast, lean cuisine, or weight watcher's.  Avoid fast or fried food.        Anemia  Stable no active signs of bleeding  12/20:  S/p 1 unit PRBC    Recent Labs   Lab 12/21/21  0303 12/22/21  0936 12/23/21  0505   Hemoglobin 7.5 L 7.3 L 7.5 L         Diabetes mellitus, type 2  Last A1c reviewed-   Lab Results   Component Value Date    HGBA1C 5.3 06/20/2021     Most recent fingerstick glucose reviewed-   Recent Labs   Lab 12/22/21  1918 12/23/21  0426   POCTGLUCOSE 104 75     Current correctional scale:  Low  Anti-hyperglycemic dose as follows-   Antihyperglycemics (From admission, onward)            None        Hold Oral hypoglycemics while patient is in the hospital.        UTI (urinary tract infection)  Continue vancomycin and meropenem will tailored based on culture and sensitivities.    E coli sensitive to Merrem.       Acute renal failure  Suspect prerenal azotemia due to severe dehydration.  Continue to monitor with volume resuscitation.  Yancey catheter has been placed for some retention    Lab Results   Component Value Date    CREATININE 3.3 (H) 12/23/2021    CREATININE 3.6 (H) 12/22/2021     CREATININE 4.0 (H) 12/21/2021            VTE Risk Mitigation (From admission, onward)         Ordered     IP VTE HIGH RISK PATIENT  Once         12/20/21 1200     Place sequential compression device  Until discontinued         12/20/21 1200                Discharge Planning   MARCELLE:      Code Status: DNR   Is the patient medically ready for discharge?:     Reason for patient still in hospital (select all that apply): Treatment  Discharge Plan A: New Nursing Home placement - [facility] care facility                  Reed Teran DO  Department of Hospital Medicine   [facility]

## 2021-12-27 NOTE — ASSESSMENT & PLAN NOTE
Stable no active signs of bleeding  12/20:  S/p 1 unit PRBC    Recent Labs   Lab 12/25/21  0541 12/26/21  0506 12/27/21  0528   Hemoglobin 7.7 L 7.9 L 7.9 L

## 2021-12-27 NOTE — ASSESSMENT & PLAN NOTE
Continue vancomycin and meropenem will tailored based on culture and sensitivities.    E coli sensitive to Merrem.  S/p course of therapy.

## 2021-12-27 NOTE — PLAN OF CARE
TRENT spoke to Augustin with [facility], he is going to follow-up with his DON regarding the placement for the patient.     TRENT will continue to monitor.

## 2021-12-27 NOTE — ASSESSMENT & PLAN NOTE
Last A1c reviewed-   Lab Results   Component Value Date    HGBA1C 5.3 06/20/2021     Most recent fingerstick glucose reviewed-   Recent Labs   Lab 12/26/21  1102 12/26/21  1604 12/26/21 1953   POCTGLUCOSE 159* 115* 126*     Current correctional scale:  Low  Anti-hyperglycemic dose as follows-   Antihyperglycemics (From admission, onward)            None        Hold Oral hypoglycemics while patient is in the hospital.

## 2021-12-27 NOTE — ASSESSMENT & PLAN NOTE
Suspect prerenal azotemia due to severe dehydration.  Continue to monitor with volume resuscitation.  Yancey catheter has been placed for some retention    Lab Results   Component Value Date    CREATININE 2.2 (H) 12/27/2021    CREATININE 2.5 (H) 12/26/2021    CREATININE 2.7 (H) 12/25/2021

## 2021-12-27 NOTE — SUBJECTIVE & OBJECTIVE
Past Medical History:   Diagnosis Date    Blood disorder     Clostridium difficile infection 2020    Colitis     Coronary artery disease     Depression     Diabetes mellitus     Dizziness 3/26/2021    GERD (gastroesophageal reflux disease)     H/O heart artery stent     Heart disease, unspecified     Hyperlipidemia     Hypertension     Memory loss     Mouth ulceration 2021    Myocardial infarct     Renal disorder     CKD    Sepsis 2021    Septic shock 2020    Stomach ulcer     Stroke     Thyroid disease     UTI (urinary tract infection) 2020       Past Surgical History:   Procedure Laterality Date    APPENDECTOMY      BILATERAL SALPINGOOPHORECTOMY       SECTION      CHOLECYSTECTOMY      CORONARY ANGIOPLASTY WITH STENT PLACEMENT      DILATION AND CURETTAGE OF UTERUS      ELBOW SURGERY      HERNIA REPAIR      abdominal    HIP SURGERY      HYSTERECTOMY  age 27    bleeding    HYSTERECTOMY      KNEE SURGERY      SHOULDER OPEN ROTATOR CUFF REPAIR         Review of patient's allergies indicates:   Allergen Reactions    Motrin [ibuprofen] Other (See Comments)     Feels like heart is swelling    Penicillins Hives and Itching    Sulfa (sulfonamide antibiotics) Nausea And Vomiting    Iodine and iodide containing products Edema    Lisinopril Edema       No current facility-administered medications on file prior to encounter.     Current Outpatient Medications on File Prior to Encounter   Medication Sig    acetaminophen (TYLENOL) 325 MG tablet Take 325 mg by mouth every 6 (six) hours as needed for Pain. 2 tablets  (650 mg)    acetaminophen (TYLENOL) 650 MG Supp Place 650 mg rectally every 8 (eight) hours as needed (for discomfort).    albuterol-ipratropium (DUO-NEB) 2.5 mg-0.5 mg/3 mL nebulizer solution Take 3 mLs by nebulization every 6 (six) hours as needed for Wheezing. Rescue    benzonatate (TESSALON) 200 MG capsule Take 200 mg by mouth 3 (three)  times daily as needed for Cough.    gabapentin (NEURONTIN) 100 MG capsule TAKE 1 CAPSULE BY MOUTH TWICE DAILY    hydrALAZINE (APRESOLINE) 25 MG tablet Take 25 mg by mouth every 12 (twelve) hours as needed.    HYDROcodone-acetaminophen (NORCO)  mg per tablet Take 1 tablet by mouth every 8 (eight) hours as needed for Pain.    hyoscyamine (ANASPAZ,LEVSIN) 0.125 mg Tab Take 125 mcg by mouth every 4 (four) hours as needed (PRN secretions).    insulin lispro 100 unit/mL injection Inject into the skin 2 (two) times a day. Sliding scale:  <60 give OJ and repeat blood sugar in 15 minutes   = 0 units  151-175 = 3 units  176-200 = 5 units  201-250 = 7 units  251-300 = 10 units  301-350 = 13 units  351-400 = 16 units  > 400 = 20 units and call MD    lactulose (CHRONULAC) 10 gram/15 mL solution Take by mouth 4 (four) times daily. Every six (6) hours as needed for constipation    LORazepam (ATIVAN) 2 mg/mL injection Inject 1 mg into the muscle every 4 (four) hours as needed (anxiety).    melatonin (MELATIN) 3 mg tablet Take 2 tablets (6 mg total) by mouth nightly as needed for Insomnia.    mirtazapine (REMERON) 15 MG tablet Take 15 mg by mouth every evening.    montelukast (SINGULAIR) 10 mg tablet Take 10 mg by mouth every evening.    morphine 100 mg/5 mL (20 mg/mL) concentrated solution Take 10 mg by mouth every 4 (four) hours as needed for Pain.    ondansetron (ZOFRAN ODT ORAL) Take 4 mg by mouth 4 (four) times daily as needed (Nausea Vomiting).    pantoprazole (PROTONIX) 40 MG tablet TAKE 1 TABLET BY MOUTH TWICE DAILY    paroxetine (PAXIL) 20 MG tablet Take 20 mg by mouth every morning.    sucralfate (CARAFATE) 1 gram tablet Take 1 g by mouth 4 (four) times daily.    tiZANidine 4 mg Cap Take 4 mg by mouth every 8 (eight) hours as needed.    aluminum & magnesium hydroxide-simethicone (MYLANTA MAXIMUM STRENGTH) 400-400-40 mg/5 mL suspension Take 10 mLs by mouth every 4 (four) hours as needed for  Indigestion.    budesonide (PULMICORT) 0.5 mg/2 mL nebulizer solution Take 2 mLs (0.5 mg total) by nebulization 2 (two) times a day. Controller     Family History     Problem Relation (Age of Onset)    Cancer Maternal Grandmother, Paternal Grandfather    Diabetes Mother    Heart attack Father    Hypertension Mother    Migraines Mother, Paternal Aunt, Son    Stroke Paternal Aunt    Thyroid disease Mother        Tobacco Use    Smoking status: Former Smoker     Quit date: 2005     Years since quittin.9    Smokeless tobacco: Not on file   Substance and Sexual Activity    Alcohol use: No    Drug use: No    Sexual activity: Not Currently     Partners: Male     Birth control/protection: Surgical, Post-menopausal     Comment:      Review of Systems   Unable to perform ROS: Mental status change     Objective:     Vital Signs (Most Recent):  Temp: 97.7 °F (36.5 °C) (21)  Pulse: (!) 56 (21)  Resp: 20 (21)  BP: (!) 176/91 (21)  SpO2: 97 % (21) Vital Signs (24h Range):  Temp:  [97.5 °F (36.4 °C)-98 °F (36.7 °C)] 97.7 °F (36.5 °C)  Pulse:  [56-67] 56  Resp:  [16-32] 20  SpO2:  [88 %-97 %] 97 %  BP: (172-202)/() 176/91     Weight: 90.2 kg (198 lb 14.4 oz)  Body mass index is 32.1 kg/m².    Physical Exam  Vitals and nursing note reviewed.   Constitutional:       Appearance: Normal appearance. She is ill-appearing.   HENT:      Head: Normocephalic and atraumatic.      Nose: Nose normal.      Mouth/Throat:      Mouth: Mucous membranes are dry.   Eyes:      Extraocular Movements: Extraocular movements intact.      Pupils: Pupils are equal, round, and reactive to light.   Cardiovascular:      Rate and Rhythm: Regular rhythm. Tachycardia present.      Heart sounds: No murmur heard.  No friction rub. No gallop.    Pulmonary:      Effort: Pulmonary effort is normal.      Breath sounds: Wheezing and rhonchi present.   Abdominal:      General: Abdomen is  flat. Bowel sounds are normal.      Palpations: Abdomen is soft.   Musculoskeletal:         General: No swelling or deformity.      Cervical back: Normal range of motion and neck supple.   Skin:     General: Skin is warm and dry.      Capillary Refill: Capillary refill takes less than 2 seconds.      Coloration: Skin is pale.   Neurological:      General: No focal deficit present.      Mental Status: She is alert.      Comments: lethargic   Psychiatric:         Mood and Affect: Mood normal.         Behavior: Behavior normal.           CRANIAL NERVES     CN III, IV, VI   Pupils are equal, round, and reactive to light.       Significant Labs: All pertinent labs within the past 24 hours have been reviewed.    Significant Imaging: I have reviewed all pertinent imaging results/findings within the past 24 hours.

## 2021-12-27 NOTE — PROGRESS NOTES
[facility] Medicine  Progress Note    Patient Name: Smiley Harmon  MRN: [id number]  Patient Class: IP- Inpatient   Admission Date: 12/20/2021  Length of Stay: 7 days  Attending Physician: Osmany Allison Jr., MD  Primary Care Provider: Osmany Allison Jr, MD        Subjective:     Principal Problem:<principal problem not specified>        HPI:  Chief Complaint   Patient presents with    Altered Mental Status       Per nursing home client not acting her normal self. Had low O2 sats and a breathing treatment was given at the home.           History Provided By: EMS     [id number]   Smiley Harmon is a 81 y.o. female with PMHX of  dementia, CVA, CAD, mi, hypertension, hyperlipidemia, who presents to the emergency department C/O altered mental status.     Patient transferred from nursing home due to altered mental status.  Per EMS patient was noted to be altered this morning.  Normally has some orientation and is interactive.  This morning patient less responsive.  Was reportedly 80% on room air.  Patient is awake but provides no history.  She is in hospice but apparently is full code.       PCP: Osmany Allison Jr, MD          Overview/Hospital Course:  12/22: Patient resting comfortably this morning.  No acute events overnight.  A.m. labs are pending.  12/23:  Patient more alert this am. Resting comfortable asking for breakfast.  12/27: Patient nonverbal this morning but resting comfortably.      Past Medical History:   Diagnosis Date    Blood disorder     Clostridium difficile infection 12/30/2020    Colitis     Coronary artery disease     Depression     Diabetes mellitus     Dizziness 3/26/2021    GERD (gastroesophageal reflux disease)     H/O heart artery stent     Heart disease, unspecified     Hyperlipidemia     Hypertension     Memory loss     Mouth ulceration 1/8/2021    Myocardial infarct     Renal disorder     CKD    Sepsis 2/9/2021    Septic shock 12/19/2020     Stomach ulcer     Stroke     Thyroid disease     UTI (urinary tract infection) 2020       Past Surgical History:   Procedure Laterality Date    APPENDECTOMY      BILATERAL SALPINGOOPHORECTOMY       SECTION      CHOLECYSTECTOMY      CORONARY ANGIOPLASTY WITH STENT PLACEMENT      DILATION AND CURETTAGE OF UTERUS      ELBOW SURGERY      HERNIA REPAIR      abdominal    HIP SURGERY      HYSTERECTOMY  age 27    bleeding    HYSTERECTOMY      KNEE SURGERY      SHOULDER OPEN ROTATOR CUFF REPAIR         Review of patient's allergies indicates:   Allergen Reactions    Motrin [ibuprofen] Other (See Comments)     Feels like heart is swelling    Penicillins Hives and Itching    Sulfa (sulfonamide antibiotics) Nausea And Vomiting    Iodine and iodide containing products Edema    Lisinopril Edema       No current facility-administered medications on file prior to encounter.     Current Outpatient Medications on File Prior to Encounter   Medication Sig    acetaminophen (TYLENOL) 325 MG tablet Take 325 mg by mouth every 6 (six) hours as needed for Pain. 2 tablets  (650 mg)    acetaminophen (TYLENOL) 650 MG Supp Place 650 mg rectally every 8 (eight) hours as needed (for discomfort).    albuterol-ipratropium (DUO-NEB) 2.5 mg-0.5 mg/3 mL nebulizer solution Take 3 mLs by nebulization every 6 (six) hours as needed for Wheezing. Rescue    benzonatate (TESSALON) 200 MG capsule Take 200 mg by mouth 3 (three) times daily as needed for Cough.    gabapentin (NEURONTIN) 100 MG capsule TAKE 1 CAPSULE BY MOUTH TWICE DAILY    hydrALAZINE (APRESOLINE) 25 MG tablet Take 25 mg by mouth every 12 (twelve) hours as needed.    HYDROcodone-acetaminophen (NORCO)  mg per tablet Take 1 tablet by mouth every 8 (eight) hours as needed for Pain.    hyoscyamine (ANASPAZ,LEVSIN) 0.125 mg Tab Take 125 mcg by mouth every 4 (four) hours as needed (PRN secretions).    insulin lispro 100 unit/mL injection Inject into  the skin 2 (two) times a day. Sliding scale:  <60 give OJ and repeat blood sugar in 15 minutes   = 0 units  151-175 = 3 units  176-200 = 5 units  201-250 = 7 units  251-300 = 10 units  301-350 = 13 units  351-400 = 16 units  > 400 = 20 units and call MD    lactulose (CHRONULAC) 10 gram/15 mL solution Take by mouth 4 (four) times daily. Every six (6) hours as needed for constipation    LORazepam (ATIVAN) 2 mg/mL injection Inject 1 mg into the muscle every 4 (four) hours as needed (anxiety).    melatonin (MELATIN) 3 mg tablet Take 2 tablets (6 mg total) by mouth nightly as needed for Insomnia.    mirtazapine (REMERON) 15 MG tablet Take 15 mg by mouth every evening.    montelukast (SINGULAIR) 10 mg tablet Take 10 mg by mouth every evening.    morphine 100 mg/5 mL (20 mg/mL) concentrated solution Take 10 mg by mouth every 4 (four) hours as needed for Pain.    ondansetron (ZOFRAN ODT ORAL) Take 4 mg by mouth 4 (four) times daily as needed (Nausea Vomiting).    pantoprazole (PROTONIX) 40 MG tablet TAKE 1 TABLET BY MOUTH TWICE DAILY    paroxetine (PAXIL) 20 MG tablet Take 20 mg by mouth every morning.    sucralfate (CARAFATE) 1 gram tablet Take 1 g by mouth 4 (four) times daily.    tiZANidine 4 mg Cap Take 4 mg by mouth every 8 (eight) hours as needed.    aluminum & magnesium hydroxide-simethicone (MYLANTA MAXIMUM STRENGTH) 400-400-40 mg/5 mL suspension Take 10 mLs by mouth every 4 (four) hours as needed for Indigestion.    budesonide (PULMICORT) 0.5 mg/2 mL nebulizer solution Take 2 mLs (0.5 mg total) by nebulization 2 (two) times a day. Controller     Family History     Problem Relation (Age of Onset)    Cancer Maternal Grandmother, Paternal Grandfather    Diabetes Mother    Heart attack Father    Hypertension Mother    Migraines Mother, Paternal Aunt, Son    Stroke Paternal Aunt    Thyroid disease Mother        Tobacco Use    Smoking status: Former Smoker     Quit date: 1/21/2005     Years since  quittin.9    Smokeless tobacco: Not on file   Substance and Sexual Activity    Alcohol use: No    Drug use: No    Sexual activity: Not Currently     Partners: Male     Birth control/protection: Surgical, Post-menopausal     Comment:      Review of Systems   Unable to perform ROS: Mental status change     Objective:     Vital Signs (Most Recent):  Temp: 97.7 °F (36.5 °C) (21)  Pulse: (!) 56 (21)  Resp: 20 (21)  BP: (!) 176/91 (21)  SpO2: 97 % (21) Vital Signs (24h Range):  Temp:  [97.5 °F (36.4 °C)-98 °F (36.7 °C)] 97.7 °F (36.5 °C)  Pulse:  [56-67] 56  Resp:  [16-32] 20  SpO2:  [88 %-97 %] 97 %  BP: (172-202)/() 176/91     Weight: 90.2 kg (198 lb 14.4 oz)  Body mass index is 32.1 kg/m².    Physical Exam  Vitals and nursing note reviewed.   Constitutional:       Appearance: Normal appearance. She is ill-appearing.   HENT:      Head: Normocephalic and atraumatic.      Nose: Nose normal.      Mouth/Throat:      Mouth: Mucous membranes are dry.   Eyes:      Extraocular Movements: Extraocular movements intact.      Pupils: Pupils are equal, round, and reactive to light.   Cardiovascular:      Rate and Rhythm: Regular rhythm. Tachycardia present.      Heart sounds: No murmur heard.  No friction rub. No gallop.    Pulmonary:      Effort: Pulmonary effort is normal.      Breath sounds: Wheezing and rhonchi present.   Abdominal:      General: Abdomen is flat. Bowel sounds are normal.      Palpations: Abdomen is soft.   Musculoskeletal:         General: No swelling or deformity.      Cervical back: Normal range of motion and neck supple.   Skin:     General: Skin is warm and dry.      Capillary Refill: Capillary refill takes less than 2 seconds.      Coloration: Skin is pale.   Neurological:      General: No focal deficit present.      Mental Status: She is alert.      Comments: lethargic   Psychiatric:         Mood and Affect: Mood normal.          Behavior: Behavior normal.           CRANIAL NERVES     CN III, IV, VI   Pupils are equal, round, and reactive to light.       Significant Labs: All pertinent labs within the past 24 hours have been reviewed.    Significant Imaging: I have reviewed all pertinent imaging results/findings within the past 24 hours.      Assessment/Plan:      Pericardial effusion  This is mild and does not appear to be causing any tamponade.  Continue to monitor.      Encephalopathy, metabolic  Secondary to uremia and acute kidney failure.  Continue to monitor with IV resuscitation      Multifocal pneumonia  Noted by x-ray.  Continue current antimicrobial regimen which will include meropenem and vancomycin given the patient's severity of illness.  Will tailored based on culture and sensitivity.    deescalate to merrem monotherapy.  Patient with multiple allergies.       Hyperlipidemia  Continue statin      Obesity  Recommendations: Stay Active. As tolerated alternate resistance training with stretching and cardio. Goal of 150 minutes per week or 7,500 - 10,000 steps per day. Follow the Mediterranean Diet. Include fruit, vegetables, olive oil, seeds, nuts, whole grains, cold water fish and lean cuts of meat.  Do not drink beverages with any caloric content.  Decrease portion sizes slightly which will result in an approximately 500-calorie deficit.  Consider substituting one meal a day with a meal replacement such as Slim fast, lean cuisine, or weight watcher's.  Avoid fast or fried food.        Anemia  Stable no active signs of bleeding  12/20:  S/p 1 unit PRBC    Recent Labs   Lab 12/25/21  0541 12/26/21  0506 12/27/21  0528   Hemoglobin 7.7 L 7.9 L 7.9 L         Diabetes mellitus, type 2  Last A1c reviewed-   Lab Results   Component Value Date    HGBA1C 5.3 06/20/2021     Most recent fingerstick glucose reviewed-   Recent Labs   Lab 12/26/21  1102 12/26/21  1604 12/26/21  1953   POCTGLUCOSE 159* 115* 126*     Current correctional  scale:  Low  Anti-hyperglycemic dose as follows-   Antihyperglycemics (From admission, onward)            None        Hold Oral hypoglycemics while patient is in the hospital.        UTI (urinary tract infection)  Continue vancomycin and meropenem will tailored based on culture and sensitivities.    E coli sensitive to Merrem.  S/p course of therapy.      Acute renal failure  Suspect prerenal azotemia due to severe dehydration.  Continue to monitor with volume resuscitation.  Yancey catheter has been placed for some retention    Lab Results   Component Value Date    CREATININE 2.2 (H) 12/27/2021    CREATININE 2.5 (H) 12/26/2021    CREATININE 2.7 (H) 12/25/2021              VTE Risk Mitigation (From admission, onward)         Ordered     IP VTE HIGH RISK PATIENT  Once         12/20/21 1200     Place sequential compression device  Until discontinued         12/20/21 1200                Discharge Planning   MARCELLE:      Code Status: DNR   Is the patient medically ready for discharge?:     Reason for patient still in hospital (select all that apply): Treatment  Discharge Plan A: New Nursing Home placement - [facility] care facility                  Osmany Allison Jr, MD  Department of Hospital Medicine   [facility]

## 2021-12-27 NOTE — ASSESSMENT & PLAN NOTE
Noted by x-ray.  Continue current antimicrobial regimen which will include meropenem and vancomycin given the patient's severity of illness.  Will tailored based on culture and sensitivity.    deescalate to merrem monotherapy.  Patient with multiple allergies.

## 2021-12-28 NOTE — ASSESSMENT & PLAN NOTE
Noted by x-ray.  Continue current antimicrobial regimen which will include meropenem and vancomycin given the patient's severity of illness.  Will tailored based on culture and sensitivity.    deescalate to merrem monotherapy.  Patient with multiple allergies.     Patient is now status post a course of antimicrobial therapy as recommended by infectious disease pharmacy staff.  She now has opacification of the left hemithorax.  Suspect volume related we will continue Lasix.

## 2021-12-28 NOTE — ASSESSMENT & PLAN NOTE
Suspect prerenal azotemia due to severe dehydration.  Continue to monitor with volume resuscitation.  Yancey catheter has been placed for some retention    Lab Results   Component Value Date    CREATININE 2.0 (H) 12/28/2021    CREATININE 2.2 (H) 12/27/2021    CREATININE 2.5 (H) 12/26/2021

## 2021-12-28 NOTE — ASSESSMENT & PLAN NOTE
Last A1c reviewed-   Lab Results   Component Value Date    HGBA1C 5.3 06/20/2021     Most recent fingerstick glucose reviewed-   Recent Labs   Lab 12/27/21  1933 12/28/21  0453   POCTGLUCOSE 113* 120*     Current correctional scale:  Low  Anti-hyperglycemic dose as follows-   Antihyperglycemics (From admission, onward)            None        Hold Oral hypoglycemics while patient is in the hospital.

## 2021-12-28 NOTE — SUBJECTIVE & OBJECTIVE
Past Medical History:   Diagnosis Date    Blood disorder     Clostridium difficile infection 2020    Colitis     Coronary artery disease     Depression     Diabetes mellitus     Dizziness 3/26/2021    GERD (gastroesophageal reflux disease)     H/O heart artery stent     Heart disease, unspecified     Hyperlipidemia     Hypertension     Memory loss     Mouth ulceration 2021    Myocardial infarct     Renal disorder     CKD    Sepsis 2021    Septic shock 2020    Stomach ulcer     Stroke     Thyroid disease     UTI (urinary tract infection) 2020       Past Surgical History:   Procedure Laterality Date    APPENDECTOMY      BILATERAL SALPINGOOPHORECTOMY       SECTION      CHOLECYSTECTOMY      CORONARY ANGIOPLASTY WITH STENT PLACEMENT      DILATION AND CURETTAGE OF UTERUS      ELBOW SURGERY      HERNIA REPAIR      abdominal    HIP SURGERY      HYSTERECTOMY  age 27    bleeding    HYSTERECTOMY      KNEE SURGERY      SHOULDER OPEN ROTATOR CUFF REPAIR         Review of patient's allergies indicates:   Allergen Reactions    Motrin [ibuprofen] Other (See Comments)     Feels like heart is swelling    Penicillins Hives and Itching    Sulfa (sulfonamide antibiotics) Nausea And Vomiting    Iodine and iodide containing products Edema    Lisinopril Edema       No current facility-administered medications on file prior to encounter.     Current Outpatient Medications on File Prior to Encounter   Medication Sig    acetaminophen (TYLENOL) 325 MG tablet Take 325 mg by mouth every 6 (six) hours as needed for Pain. 2 tablets  (650 mg)    acetaminophen (TYLENOL) 650 MG Supp Place 650 mg rectally every 8 (eight) hours as needed (for discomfort).    albuterol-ipratropium (DUO-NEB) 2.5 mg-0.5 mg/3 mL nebulizer solution Take 3 mLs by nebulization every 6 (six) hours as needed for Wheezing. Rescue    benzonatate (TESSALON) 200 MG capsule Take 200 mg by mouth 3 (three)  times daily as needed for Cough.    gabapentin (NEURONTIN) 100 MG capsule TAKE 1 CAPSULE BY MOUTH TWICE DAILY    hydrALAZINE (APRESOLINE) 25 MG tablet Take 25 mg by mouth every 12 (twelve) hours as needed.    HYDROcodone-acetaminophen (NORCO)  mg per tablet Take 1 tablet by mouth every 8 (eight) hours as needed for Pain.    hyoscyamine (ANASPAZ,LEVSIN) 0.125 mg Tab Take 125 mcg by mouth every 4 (four) hours as needed (PRN secretions).    insulin lispro 100 unit/mL injection Inject into the skin 2 (two) times a day. Sliding scale:  <60 give OJ and repeat blood sugar in 15 minutes   = 0 units  151-175 = 3 units  176-200 = 5 units  201-250 = 7 units  251-300 = 10 units  301-350 = 13 units  351-400 = 16 units  > 400 = 20 units and call MD    lactulose (CHRONULAC) 10 gram/15 mL solution Take by mouth 4 (four) times daily. Every six (6) hours as needed for constipation    LORazepam (ATIVAN) 2 mg/mL injection Inject 1 mg into the muscle every 4 (four) hours as needed (anxiety).    melatonin (MELATIN) 3 mg tablet Take 2 tablets (6 mg total) by mouth nightly as needed for Insomnia.    mirtazapine (REMERON) 15 MG tablet Take 15 mg by mouth every evening.    montelukast (SINGULAIR) 10 mg tablet Take 10 mg by mouth every evening.    morphine 100 mg/5 mL (20 mg/mL) concentrated solution Take 10 mg by mouth every 4 (four) hours as needed for Pain.    ondansetron (ZOFRAN ODT ORAL) Take 4 mg by mouth 4 (four) times daily as needed (Nausea Vomiting).    pantoprazole (PROTONIX) 40 MG tablet TAKE 1 TABLET BY MOUTH TWICE DAILY    paroxetine (PAXIL) 20 MG tablet Take 20 mg by mouth every morning.    sucralfate (CARAFATE) 1 gram tablet Take 1 g by mouth 4 (four) times daily.    tiZANidine 4 mg Cap Take 4 mg by mouth every 8 (eight) hours as needed.    aluminum & magnesium hydroxide-simethicone (MYLANTA MAXIMUM STRENGTH) 400-400-40 mg/5 mL suspension Take 10 mLs by mouth every 4 (four) hours as needed for  Indigestion.    budesonide (PULMICORT) 0.5 mg/2 mL nebulizer solution Take 2 mLs (0.5 mg total) by nebulization 2 (two) times a day. Controller     Family History     Problem Relation (Age of Onset)    Cancer Maternal Grandmother, Paternal Grandfather    Diabetes Mother    Heart attack Father    Hypertension Mother    Migraines Mother, Paternal Aunt, Son    Stroke Paternal Aunt    Thyroid disease Mother        Tobacco Use    Smoking status: Former Smoker     Quit date: 2005     Years since quittin.9    Smokeless tobacco: Not on file   Substance and Sexual Activity    Alcohol use: No    Drug use: No    Sexual activity: Not Currently     Partners: Male     Birth control/protection: Surgical, Post-menopausal     Comment:      Review of Systems   Unable to perform ROS: Mental status change     Objective:     Vital Signs (Most Recent):  Temp: 97.6 °F (36.4 °C) (21 0845)  Pulse: 63 (21 0845)  Resp: 20 (21 0845)  BP: (!) 168/83 (21 0845)  SpO2: (!) 90 % (21 0845) Vital Signs (24h Range):  Temp:  [96.8 °F (36 °C)-97.9 °F (36.6 °C)] 97.6 °F (36.4 °C)  Pulse:  [61-64] 63  Resp:  [18-20] 20  SpO2:  [88 %-96 %] 90 %  BP: (168-181)/() 168/83     Weight: 90.2 kg (198 lb 14.4 oz)  Body mass index is 32.1 kg/m².    Physical Exam  Vitals and nursing note reviewed.   Constitutional:       Appearance: Normal appearance. She is ill-appearing.   HENT:      Head: Normocephalic and atraumatic.      Nose: Nose normal.      Mouth/Throat:      Mouth: Mucous membranes are dry.   Eyes:      Extraocular Movements: Extraocular movements intact.      Pupils: Pupils are equal, round, and reactive to light.   Cardiovascular:      Rate and Rhythm: Regular rhythm. Tachycardia present.      Heart sounds: No murmur heard.  No friction rub. No gallop.    Pulmonary:      Effort: Pulmonary effort is normal.      Breath sounds: Wheezing and rhonchi present.   Abdominal:      General: Abdomen is  flat. Bowel sounds are normal.      Palpations: Abdomen is soft.   Musculoskeletal:         General: No swelling or deformity.      Cervical back: Normal range of motion and neck supple.   Skin:     General: Skin is warm and dry.      Capillary Refill: Capillary refill takes less than 2 seconds.      Coloration: Skin is pale.   Neurological:      General: No focal deficit present.      Mental Status: She is alert.      Comments: lethargic   Psychiatric:         Mood and Affect: Mood normal.         Behavior: Behavior normal.           CRANIAL NERVES     CN III, IV, VI   Pupils are equal, round, and reactive to light.       Significant Labs: All pertinent labs within the past 24 hours have been reviewed.    Significant Imaging: I have reviewed all pertinent imaging results/findings within the past 24 hours.

## 2021-12-28 NOTE — ASSESSMENT & PLAN NOTE
Stable no active signs of bleeding  12/20:  S/p 1 unit PRBC    Recent Labs   Lab 12/26/21  0506 12/27/21  0528 12/28/21  0541   Hemoglobin 7.9 L 7.9 L 8.2 L

## 2021-12-28 NOTE — PROGRESS NOTES
[facility] Medicine  Progress Note    Patient Name: Smiley Harmon  MRN: [id number]  Patient Class: IP- Inpatient   Admission Date: 12/20/2021  Length of Stay: 8 days  Attending Physician: Osmany Allison Jr., MD  Primary Care Provider: Osmany Allison Jr, MD        Subjective:     Principal Problem:<principal problem not specified>        HPI:  Chief Complaint   Patient presents with    Altered Mental Status       Per nursing home client not acting her normal self. Had low O2 sats and a breathing treatment was given at the home.           History Provided By: EMS     [id number]   Smiley Harmon is a 81 y.o. female with PMHX of  dementia, CVA, CAD, mi, hypertension, hyperlipidemia, who presents to the emergency department C/O altered mental status.     Patient transferred from nursing home due to altered mental status.  Per EMS patient was noted to be altered this morning.  Normally has some orientation and is interactive.  This morning patient less responsive.  Was reportedly 80% on room air.  Patient is awake but provides no history.  She is in hospice but apparently is full code.       PCP: Osmany Allison Jr, MD          Overview/Hospital Course:  12/22: Patient resting comfortably this morning.  No acute events overnight.  A.m. labs are pending.  12/23:  Patient more alert this am. Resting comfortable asking for breakfast.  12/27: Patient nonverbal this morning but resting comfortably.  12/28: Patient status post a course of antimicrobial therapy.  She now has opacification of the hemithorax.  Suspect fluid related.      Past Medical History:   Diagnosis Date    Blood disorder     Clostridium difficile infection 12/30/2020    Colitis     Coronary artery disease     Depression     Diabetes mellitus     Dizziness 3/26/2021    GERD (gastroesophageal reflux disease)     H/O heart artery stent     Heart disease, unspecified     Hyperlipidemia     Hypertension     Memory loss      Mouth ulceration 2021    Myocardial infarct     Renal disorder     CKD    Sepsis 2021    Septic shock 2020    Stomach ulcer     Stroke     Thyroid disease     UTI (urinary tract infection) 2020       Past Surgical History:   Procedure Laterality Date    APPENDECTOMY      BILATERAL SALPINGOOPHORECTOMY       SECTION      CHOLECYSTECTOMY      CORONARY ANGIOPLASTY WITH STENT PLACEMENT      DILATION AND CURETTAGE OF UTERUS      ELBOW SURGERY      HERNIA REPAIR      abdominal    HIP SURGERY      HYSTERECTOMY  age 27    bleeding    HYSTERECTOMY      KNEE SURGERY      SHOULDER OPEN ROTATOR CUFF REPAIR         Review of patient's allergies indicates:   Allergen Reactions    Motrin [ibuprofen] Other (See Comments)     Feels like heart is swelling    Penicillins Hives and Itching    Sulfa (sulfonamide antibiotics) Nausea And Vomiting    Iodine and iodide containing products Edema    Lisinopril Edema       No current facility-administered medications on file prior to encounter.     Current Outpatient Medications on File Prior to Encounter   Medication Sig    acetaminophen (TYLENOL) 325 MG tablet Take 325 mg by mouth every 6 (six) hours as needed for Pain. 2 tablets  (650 mg)    acetaminophen (TYLENOL) 650 MG Supp Place 650 mg rectally every 8 (eight) hours as needed (for discomfort).    albuterol-ipratropium (DUO-NEB) 2.5 mg-0.5 mg/3 mL nebulizer solution Take 3 mLs by nebulization every 6 (six) hours as needed for Wheezing. Rescue    benzonatate (TESSALON) 200 MG capsule Take 200 mg by mouth 3 (three) times daily as needed for Cough.    gabapentin (NEURONTIN) 100 MG capsule TAKE 1 CAPSULE BY MOUTH TWICE DAILY    hydrALAZINE (APRESOLINE) 25 MG tablet Take 25 mg by mouth every 12 (twelve) hours as needed.    HYDROcodone-acetaminophen (NORCO)  mg per tablet Take 1 tablet by mouth every 8 (eight) hours as needed for Pain.    hyoscyamine (ANASPAZ,LEVSIN) 0.125 mg  Tab Take 125 mcg by mouth every 4 (four) hours as needed (PRN secretions).    insulin lispro 100 unit/mL injection Inject into the skin 2 (two) times a day. Sliding scale:  <60 give OJ and repeat blood sugar in 15 minutes   = 0 units  151-175 = 3 units  176-200 = 5 units  201-250 = 7 units  251-300 = 10 units  301-350 = 13 units  351-400 = 16 units  > 400 = 20 units and call MD    lactulose (CHRONULAC) 10 gram/15 mL solution Take by mouth 4 (four) times daily. Every six (6) hours as needed for constipation    LORazepam (ATIVAN) 2 mg/mL injection Inject 1 mg into the muscle every 4 (four) hours as needed (anxiety).    melatonin (MELATIN) 3 mg tablet Take 2 tablets (6 mg total) by mouth nightly as needed for Insomnia.    mirtazapine (REMERON) 15 MG tablet Take 15 mg by mouth every evening.    montelukast (SINGULAIR) 10 mg tablet Take 10 mg by mouth every evening.    morphine 100 mg/5 mL (20 mg/mL) concentrated solution Take 10 mg by mouth every 4 (four) hours as needed for Pain.    ondansetron (ZOFRAN ODT ORAL) Take 4 mg by mouth 4 (four) times daily as needed (Nausea Vomiting).    pantoprazole (PROTONIX) 40 MG tablet TAKE 1 TABLET BY MOUTH TWICE DAILY    paroxetine (PAXIL) 20 MG tablet Take 20 mg by mouth every morning.    sucralfate (CARAFATE) 1 gram tablet Take 1 g by mouth 4 (four) times daily.    tiZANidine 4 mg Cap Take 4 mg by mouth every 8 (eight) hours as needed.    aluminum & magnesium hydroxide-simethicone (MYLANTA MAXIMUM STRENGTH) 400-400-40 mg/5 mL suspension Take 10 mLs by mouth every 4 (four) hours as needed for Indigestion.    budesonide (PULMICORT) 0.5 mg/2 mL nebulizer solution Take 2 mLs (0.5 mg total) by nebulization 2 (two) times a day. Controller     Family History     Problem Relation (Age of Onset)    Cancer Maternal Grandmother, Paternal Grandfather    Diabetes Mother    Heart attack Father    Hypertension Mother    Migraines Mother, Paternal Aunt, Son    Stroke Paternal  Aunt    Thyroid disease Mother        Tobacco Use    Smoking status: Former Smoker     Quit date: 2005     Years since quittin.9    Smokeless tobacco: Not on file   Substance and Sexual Activity    Alcohol use: No    Drug use: No    Sexual activity: Not Currently     Partners: Male     Birth control/protection: Surgical, Post-menopausal     Comment:      Review of Systems   Unable to perform ROS: Mental status change     Objective:     Vital Signs (Most Recent):  Temp: 97.6 °F (36.4 °C) (21 0845)  Pulse: 63 (21 0845)  Resp: 20 (21 0845)  BP: (!) 168/83 (21 0845)  SpO2: (!) 90 % (21 0845) Vital Signs (24h Range):  Temp:  [96.8 °F (36 °C)-97.9 °F (36.6 °C)] 97.6 °F (36.4 °C)  Pulse:  [61-64] 63  Resp:  [18-20] 20  SpO2:  [88 %-96 %] 90 %  BP: (168-181)/() 168/83     Weight: 90.2 kg (198 lb 14.4 oz)  Body mass index is 32.1 kg/m².    Physical Exam  Vitals and nursing note reviewed.   Constitutional:       Appearance: Normal appearance. She is ill-appearing.   HENT:      Head: Normocephalic and atraumatic.      Nose: Nose normal.      Mouth/Throat:      Mouth: Mucous membranes are dry.   Eyes:      Extraocular Movements: Extraocular movements intact.      Pupils: Pupils are equal, round, and reactive to light.   Cardiovascular:      Rate and Rhythm: Regular rhythm. Tachycardia present.      Heart sounds: No murmur heard.  No friction rub. No gallop.    Pulmonary:      Effort: Pulmonary effort is normal.      Breath sounds: Wheezing and rhonchi present.   Abdominal:      General: Abdomen is flat. Bowel sounds are normal.      Palpations: Abdomen is soft.   Musculoskeletal:         General: No swelling or deformity.      Cervical back: Normal range of motion and neck supple.   Skin:     General: Skin is warm and dry.      Capillary Refill: Capillary refill takes less than 2 seconds.      Coloration: Skin is pale.   Neurological:      General: No focal deficit  present.      Mental Status: She is alert.      Comments: lethargic   Psychiatric:         Mood and Affect: Mood normal.         Behavior: Behavior normal.           CRANIAL NERVES     CN III, IV, VI   Pupils are equal, round, and reactive to light.       Significant Labs: All pertinent labs within the past 24 hours have been reviewed.    Significant Imaging: I have reviewed all pertinent imaging results/findings within the past 24 hours.      Assessment/Plan:      Pericardial effusion  This is mild and does not appear to be causing any tamponade.  Continue to monitor.      Encephalopathy, metabolic  Secondary to uremia and acute kidney failure.  Continue to monitor with IV resuscitation      Multifocal pneumonia  Noted by x-ray.  Continue current antimicrobial regimen which will include meropenem and vancomycin given the patient's severity of illness.  Will tailored based on culture and sensitivity.    deescalate to merrem monotherapy.  Patient with multiple allergies.     Patient is now status post a course of antimicrobial therapy as recommended by infectious disease pharmacy staff.  She now has opacification of the left hemithorax.  Suspect volume related we will continue Lasix.      Hyperlipidemia  Continue statin      Obesity  Recommendations: Stay Active. As tolerated alternate resistance training with stretching and cardio. Goal of 150 minutes per week or 7,500 - 10,000 steps per day. Follow the Mediterranean Diet. Include fruit, vegetables, olive oil, seeds, nuts, whole grains, cold water fish and lean cuts of meat.  Do not drink beverages with any caloric content.  Decrease portion sizes slightly which will result in an approximately 500-calorie deficit.  Consider substituting one meal a day with a meal replacement such as Slim fast, lean cuisine, or weight watcher's.  Avoid fast or fried food.        Anemia  Stable no active signs of bleeding  12/20:  S/p 1 unit PRBC    Recent Labs   Lab 12/26/21  0506  12/27/21  0528 12/28/21  0541   Hemoglobin 7.9 L 7.9 L 8.2 L         Diabetes mellitus, type 2  Last A1c reviewed-   Lab Results   Component Value Date    HGBA1C 5.3 06/20/2021     Most recent fingerstick glucose reviewed-   Recent Labs   Lab 12/27/21  1933 12/28/21  0453   POCTGLUCOSE 113* 120*     Current correctional scale:  Low  Anti-hyperglycemic dose as follows-   Antihyperglycemics (From admission, onward)            None        Hold Oral hypoglycemics while patient is in the hospital.        UTI (urinary tract infection)  Continue vancomycin and meropenem will tailored based on culture and sensitivities.    E coli sensitive to Merrem.  S/p course of therapy.  No more treatment needed at this time.      Acute renal failure  Suspect prerenal azotemia due to severe dehydration.  Continue to monitor with volume resuscitation.  Yancey catheter has been placed for some retention    Lab Results   Component Value Date    CREATININE 2.0 (H) 12/28/2021    CREATININE 2.2 (H) 12/27/2021    CREATININE 2.5 (H) 12/26/2021              VTE Risk Mitigation (From admission, onward)         Ordered     IP VTE HIGH RISK PATIENT  Once         12/20/21 1200     Place sequential compression device  Until discontinued         12/20/21 1200                Discharge Planning   MARCELLE:      Code Status: DNR   Is the patient medically ready for discharge?:     Reason for patient still in hospital (select all that apply): Treatment  Discharge Plan A: New Nursing Home placement - [facility] care facility                  Osmany Allison Jr, MD  Department of Hospital Medicine   [facility] Surg

## 2021-12-28 NOTE — ASSESSMENT & PLAN NOTE
Continue vancomycin and meropenem will tailored based on culture and sensitivities.    E coli sensitive to Merrem.  S/p course of therapy.  No more treatment needed at this time.

## 2021-12-29 NOTE — ASSESSMENT & PLAN NOTE
Noted by x-ray.  Continue current antimicrobial regimen which will include meropenem and vancomycin given the patient's severity of illness.  Will tailored based on culture and sensitivity.    deescalate to merrem monotherapy.  Patient with multiple allergies.     Patient is now status post a course of antimicrobial therapy as recommended by infectious disease pharmacy staff.  She now has opacification of the left hemithorax.  Suspect volume related we will continue Lasix.    12/29: Patient is having worsening respiratory distress by diuretic therapy.  The patient is end-stage family has been counseled.

## 2021-12-29 NOTE — ASSESSMENT & PLAN NOTE
Last A1c reviewed-   Lab Results   Component Value Date    HGBA1C 5.3 06/20/2021     Most recent fingerstick glucose reviewed-   Recent Labs   Lab 12/28/21  1930 12/29/21  0448   POCTGLUCOSE 103 101     Current correctional scale:  Low  Anti-hyperglycemic dose as follows-   Antihyperglycemics (From admission, onward)            None        Hold Oral hypoglycemics while patient is in the hospital.

## 2021-12-29 NOTE — ASSESSMENT & PLAN NOTE
Worsening due to respiratory failure.  Family has been counseled.  Continue close inpatient monitoring and palliative care

## 2021-12-29 NOTE — PROGRESS NOTES
[facility] Medicine  Progress Note    Patient Name: Smiley Harmon  MRN: [id number]  Patient Class: IP- Inpatient   Admission Date: 12/20/2021  Length of Stay: 9 days  Attending Physician: Osmany Allison Jr., MD  Primary Care Provider: Osmany Allison Jr, MD        Subjective:     Principal Problem:<principal problem not specified>        HPI:  Chief Complaint   Patient presents with    Altered Mental Status       Per nursing home client not acting her normal self. Had low O2 sats and a breathing treatment was given at the home.           History Provided By: EMS     0839   Smiley Harmon is a 81 y.o. female with PMHX of  dementia, CVA, CAD, mi, hypertension, hyperlipidemia, who presents to the emergency department C/O altered mental status.     Patient transferred from nursing home due to altered mental status.  Per EMS patient was noted to be altered this morning.  Normally has some orientation and is interactive.  This morning patient less responsive.  Was reportedly 80% on room air.  Patient is awake but provides no history.  She is in hospice but apparently is full code.       PCP: Osmany Allison Jr, MD          Overview/Hospital Course:  12/22: Patient resting comfortably this morning.  No acute events overnight.  A.m. labs are pending.  12/23:  Patient more alert this am. Resting comfortable asking for breakfast.  12/27: Patient nonverbal this morning but resting comfortably.  12/28: Patient status post a course of antimicrobial therapy.  She now has opacification of the hemithorax.  Suspect fluid related.  12/29:  patient agonal breathing this morning.  Suspect worsening mucous plugging.  Family made aware.         Past Medical History:   Diagnosis Date    Blood disorder     Clostridium difficile infection 12/30/2020    Colitis     Coronary artery disease     Depression     Diabetes mellitus     Dizziness 3/26/2021    GERD (gastroesophageal reflux disease)     H/O heart  artery stent     Heart disease, unspecified     Hyperlipidemia     Hypertension     Memory loss     Mouth ulceration 2021    Myocardial infarct     Renal disorder     CKD    Sepsis 2021    Septic shock 2020    Stomach ulcer     Stroke     Thyroid disease     UTI (urinary tract infection) 2020       Past Surgical History:   Procedure Laterality Date    APPENDECTOMY      BILATERAL SALPINGOOPHORECTOMY       SECTION      CHOLECYSTECTOMY      CORONARY ANGIOPLASTY WITH STENT PLACEMENT      DILATION AND CURETTAGE OF UTERUS      ELBOW SURGERY      HERNIA REPAIR      abdominal    HIP SURGERY      HYSTERECTOMY  age 27    bleeding    HYSTERECTOMY      KNEE SURGERY      SHOULDER OPEN ROTATOR CUFF REPAIR         Review of patient's allergies indicates:   Allergen Reactions    Motrin [ibuprofen] Other (See Comments)     Feels like heart is swelling    Penicillins Hives and Itching    Sulfa (sulfonamide antibiotics) Nausea And Vomiting    Iodine and iodide containing products Edema    Lisinopril Edema       No current facility-administered medications on file prior to encounter.     Current Outpatient Medications on File Prior to Encounter   Medication Sig    acetaminophen (TYLENOL) 325 MG tablet Take 325 mg by mouth every 6 (six) hours as needed for Pain. 2 tablets  (650 mg)    acetaminophen (TYLENOL) 650 MG Supp Place 650 mg rectally every 8 (eight) hours as needed (for discomfort).    albuterol-ipratropium (DUO-NEB) 2.5 mg-0.5 mg/3 mL nebulizer solution Take 3 mLs by nebulization every 6 (six) hours as needed for Wheezing. Rescue    benzonatate (TESSALON) 200 MG capsule Take 200 mg by mouth 3 (three) times daily as needed for Cough.    gabapentin (NEURONTIN) 100 MG capsule TAKE 1 CAPSULE BY MOUTH TWICE DAILY    hydrALAZINE (APRESOLINE) 25 MG tablet Take 25 mg by mouth every 12 (twelve) hours as needed.    HYDROcodone-acetaminophen (NORCO)  mg per tablet  Take 1 tablet by mouth every 8 (eight) hours as needed for Pain.    hyoscyamine (ANASPAZ,LEVSIN) 0.125 mg Tab Take 125 mcg by mouth every 4 (four) hours as needed (PRN secretions).    insulin lispro 100 unit/mL injection Inject into the skin 2 (two) times a day. Sliding scale:  <60 give OJ and repeat blood sugar in 15 minutes   = 0 units  151-175 = 3 units  176-200 = 5 units  201-250 = 7 units  251-300 = 10 units  301-350 = 13 units  351-400 = 16 units  > 400 = 20 units and call MD    lactulose (CHRONULAC) 10 gram/15 mL solution Take by mouth 4 (four) times daily. Every six (6) hours as needed for constipation    LORazepam (ATIVAN) 2 mg/mL injection Inject 1 mg into the muscle every 4 (four) hours as needed (anxiety).    melatonin (MELATIN) 3 mg tablet Take 2 tablets (6 mg total) by mouth nightly as needed for Insomnia.    mirtazapine (REMERON) 15 MG tablet Take 15 mg by mouth every evening.    montelukast (SINGULAIR) 10 mg tablet Take 10 mg by mouth every evening.    morphine 100 mg/5 mL (20 mg/mL) concentrated solution Take 10 mg by mouth every 4 (four) hours as needed for Pain.    ondansetron (ZOFRAN ODT ORAL) Take 4 mg by mouth 4 (four) times daily as needed (Nausea Vomiting).    pantoprazole (PROTONIX) 40 MG tablet TAKE 1 TABLET BY MOUTH TWICE DAILY    paroxetine (PAXIL) 20 MG tablet Take 20 mg by mouth every morning.    sucralfate (CARAFATE) 1 gram tablet Take 1 g by mouth 4 (four) times daily.    tiZANidine 4 mg Cap Take 4 mg by mouth every 8 (eight) hours as needed.    aluminum & magnesium hydroxide-simethicone (MYLANTA MAXIMUM STRENGTH) 400-400-40 mg/5 mL suspension Take 10 mLs by mouth every 4 (four) hours as needed for Indigestion.    budesonide (PULMICORT) 0.5 mg/2 mL nebulizer solution Take 2 mLs (0.5 mg total) by nebulization 2 (two) times a day. Controller     Family History     Problem Relation (Age of Onset)    Cancer Maternal Grandmother, Paternal Grandfather    Diabetes Mother     Heart attack Father    Hypertension Mother    Migraines Mother, Paternal Aunt, Son    Stroke Paternal Aunt    Thyroid disease Mother        Tobacco Use    Smoking status: Former Smoker     Quit date: 2005     Years since quittin.9    Smokeless tobacco: Not on file   Substance and Sexual Activity    Alcohol use: No    Drug use: No    Sexual activity: Not Currently     Partners: Male     Birth control/protection: Surgical, Post-menopausal     Comment:      Review of Systems   Unable to perform ROS: Mental status change     Objective:     Vital Signs (Most Recent):  Temp: 98.8 °F (37.1 °C) (21)  Pulse: 60 (21)  Resp: 20 (21)  BP: 118/61 (21)  SpO2: (!) 82 % (21) Vital Signs (24h Range):  Temp:  [96.9 °F (36.1 °C)-98.8 °F (37.1 °C)] 98.8 °F (37.1 °C)  Pulse:  [52-73] 60  Resp:  [17-40] 20  SpO2:  [82 %-94 %] 82 %  BP: (118-179)/(61-99) 118/61     Weight: 90.2 kg (198 lb 14.4 oz)  Body mass index is 32.1 kg/m².    Physical Exam  Vitals and nursing note reviewed.   Constitutional:       Appearance: She is ill-appearing and toxic-appearing.   HENT:      Head: Normocephalic and atraumatic.      Nose: Nose normal.      Mouth/Throat:      Mouth: Mucous membranes are dry.   Eyes:      Extraocular Movements: Extraocular movements intact.      Pupils: Pupils are equal, round, and reactive to light.   Cardiovascular:      Rate and Rhythm: Regular rhythm. Tachycardia present.      Heart sounds: No murmur heard.  No friction rub. No gallop.    Pulmonary:      Effort: Pulmonary effort is normal.      Breath sounds: Wheezing, rhonchi and rales present.   Abdominal:      General: Abdomen is flat. Bowel sounds are normal.      Palpations: Abdomen is soft.   Musculoskeletal:         General: No swelling or deformity.      Cervical back: Normal range of motion and neck supple.   Skin:     General: Skin is warm and dry.      Capillary Refill: Capillary refill  takes less than 2 seconds.      Coloration: Skin is pale.   Neurological:      Comments: lethargic           CRANIAL NERVES     CN III, IV, VI   Pupils are equal, round, and reactive to light.       Significant Labs: All pertinent labs within the past 24 hours have been reviewed.    Significant Imaging: I have reviewed all pertinent imaging results/findings within the past 24 hours.      Assessment/Plan:      Pericardial effusion  This is mild and does not appear to be causing any tamponade.  Continue to monitor.      Encephalopathy, metabolic  Worsening due to respiratory failure.  Family has been counseled.  Continue close inpatient monitoring and palliative care      Multifocal pneumonia  Noted by x-ray.  Continue current antimicrobial regimen which will include meropenem and vancomycin given the patient's severity of illness.  Will tailored based on culture and sensitivity.    deescalate to merrem monotherapy.  Patient with multiple allergies.     Patient is now status post a course of antimicrobial therapy as recommended by infectious disease pharmacy staff.  She now has opacification of the left hemithorax.  Suspect volume related we will continue Lasix.    12/29: Patient is having worsening respiratory distress by diuretic therapy.  The patient is end-stage family has been counseled.      Hyperlipidemia  Continue statin      Obesity  Recommendations: Stay Active. As tolerated alternate resistance training with stretching and cardio. Goal of 150 minutes per week or 7,500 - 10,000 steps per day. Follow the Mediterranean Diet. Include fruit, vegetables, olive oil, seeds, nuts, whole grains, cold water fish and lean cuts of meat.  Do not drink beverages with any caloric content.  Decrease portion sizes slightly which will result in an approximately 500-calorie deficit.  Consider substituting one meal a day with a meal replacement such as Slim fast, lean cuisine, or weight watcher's.  Avoid fast or fried  food.        Anemia  Stable no active signs of bleeding  12/20:  S/p 1 unit PRBC    Recent Labs   Lab 12/26/21  0506 12/27/21  0528 12/28/21  0541   Hemoglobin 7.9 L 7.9 L 8.2 L         Diabetes mellitus, type 2  Last A1c reviewed-   Lab Results   Component Value Date    HGBA1C 5.3 06/20/2021     Most recent fingerstick glucose reviewed-   Recent Labs   Lab 12/28/21  1930 12/29/21  0448   POCTGLUCOSE 103 101     Current correctional scale:  Low  Anti-hyperglycemic dose as follows-   Antihyperglycemics (From admission, onward)            None        Hold Oral hypoglycemics while patient is in the hospital.        UTI (urinary tract infection)  Continue vancomycin and meropenem will tailored based on culture and sensitivities.    E coli sensitive to Merrem.  S/p course of therapy.  No more treatment needed at this time.      Acute renal failure  Suspect prerenal azotemia due to severe dehydration.  Continue to monitor with volume resuscitation.  Yanecy catheter has been placed for some retention    Lab Results   Component Value Date    CREATININE 2.0 (H) 12/28/2021    CREATININE 2.2 (H) 12/27/2021    CREATININE 2.5 (H) 12/26/2021              VTE Risk Mitigation (From admission, onward)         Ordered     IP VTE HIGH RISK PATIENT  Once         12/20/21 1200     Place sequential compression device  Until discontinued         12/20/21 1200                Discharge Planning   MARCELLE:      Code Status: DNR   Is the patient medically ready for discharge?:     Reason for patient still in hospital (select all that apply): Treatment  Discharge Plan A: New Nursing Home placement - shelter care facility                  Osmany Allison Jr, MD  Department of Hospital Medicine   [facility]

## 2021-12-29 NOTE — PROGRESS NOTES
Pharmacist Renal Dose Adjustment Note    Smiley Harmon is a 81 y.o. female being treated with the medication Merrem.     Patient Data:    Vital Signs (Most Recent):  Temp: 98.8 °F (37.1 °C) (12/29/21 0734)  Pulse: 60 (12/29/21 0734)  Resp: 20 (12/29/21 0734)  BP: 118/61 (12/29/21 0734)  SpO2: (!) 82 % (12/29/21 0734)   Vital Signs (72h Range):  Temp:  [96.8 °F (36 °C)-98.8 °F (37.1 °C)]   Pulse:  [52-73]   Resp:  [16-40]   BP: (118-202)/()   SpO2:  [82 %-97 %]      Recent Labs   Lab 12/27/21  0528 12/28/21  0541 12/29/21  0539   CREATININE 2.2* 2.0* 2.1*     Serum creatinine: 2.1 mg/dL (H) 12/29/21 0539  Estimated creatinine clearance: 24.6 mL/min (A)    Medication:Merrem dose: 1gram frequency every 8 hours will be changed to medication:Merrem dose:1gram  frequency:every 12 hours.     Pharmacist's Name: SUHAIL KING  Pharmacist's Extension: [phone]

## 2021-12-29 NOTE — PROGRESS NOTES
Pharmacokinetic Initial Assessment: IV Vancomycin    Assessment/Plan:    Initiate intravenous vancomycin with loading dose of 1750 mg once (20mg/kg)followed by a maintenance dose of vancomycin 750mg IV every 24 hours  Desired empiric serum trough concentration is 15 to 20 mcg/mL  Draw vancomycin trough level 60 min prior to third dose on 12/31/21 at approximately 1300  Pharmacy will continue to follow and monitor vancomycin.      Please contact pharmacy at extension [phone] with any questions regarding this assessment.     Thank you for the consult,   SUHAIL KING       Patient brief summary:  Smiley Harmon is a 81 y.o. female initiated on antimicrobial therapy with IV Vancomycin for treatment of suspected  pneumonia.     Drug Allergies:   Review of patient's allergies indicates:   Allergen Reactions    Motrin [ibuprofen] Other (See Comments)     Feels like heart is swelling    Penicillins Hives and Itching    Sulfa (sulfonamide antibiotics) Nausea And Vomiting    Iodine and iodide containing products Edema    Lisinopril Edema       Actual Body Weight:   90.2kg     Renal Function:   Estimated Creatinine Clearance: 24.6 mL/min (A) (based on SCr of 2.1 mg/dL (H)).,     Dialysis Method (if applicable):  N/A    CBC (last 72 hours):  Recent Labs   Lab Result Units 12/27/21  0528 12/28/21  0541 12/29/21  0540   WBC K/uL 5.76 8.38 7.82   Hemoglobin g/dL 7.9* 8.2* 7.3*   Hematocrit % 25.8* 26.3* 24.9*   Platelets K/uL 256 269 246   Gran % % 70.0 76.6* 69.0   Lymph % % 13.0* 7.2* 14.0*   Mono % % 5.0 7.0 1.0*   Eosinophil % % 0.0 2.3 2.0   Basophil % % 0.0 0.7 0.0   Differential Method  Manual Automated Manual       Metabolic Panel (last 72 hours):  Recent Labs   Lab Result Units 12/27/21  0528 12/28/21  0541 12/29/21  0539   Sodium mmol/L 139 139 140   Potassium mmol/L 3.2* 3.5 3.2*   Chloride mmol/L 106 105 105   CO2 mmol/L 24 24 27   Glucose mg/dL 102 102 108   BUN mg/dL 22 22 25*   Creatinine mg/dL 2.2* 2.0* 2.1*    Albumin g/dL 3.0* 2.9* 2.9*   Total Bilirubin mg/dL 0.2 0.3 0.2   Alkaline Phosphatase U/L 70 67 63   AST U/L 20 18 13   ALT U/L 13 12 11   Magnesium mg/dL 1.8 1.7 1.7       Drug levels (last 3 results):  No results for input(s): VANCOMYCINRA, VANCOMYCINPE, VANCOMYCINTR in the last 72 hours.    Microbiologic Results:  Microbiology Results (last 7 days)       Procedure Component Value Units Date/Time    Culture, Respiratory with Gram Stain [[id number]]     Order Status: No result Specimen: Respiratory from Sputum     Blood culture x two cultures. Draw prior to antibiotics. [[id number]] Collected: 12/20/21 0923    Order Status: Completed Specimen: Blood Updated: 12/25/21 1812     Blood Culture, Routine No growth after 5 days.    Narrative:      Aerobic and anaerobic    Blood culture x two cultures. Draw prior to antibiotics. [[id number]] Collected: 12/20/21 0931    Order Status: Completed Specimen: Blood Updated: 12/25/21 1812     Blood Culture, Routine No growth after 5 days.    Narrative:      Aerobic and anaerobic    Urine culture [[id number]]  (Abnormal)  (Susceptibility) Collected: 12/20/21 1100    Order Status: Completed Specimen: Urine Updated: 12/22/21 2149     Urine Culture, Routine ESCHERICHIA COLI  >100,000 cfu/ml      Narrative:      Preferred Collection Type->Urine, Clean Catch  Specimen Source->Urine

## 2022-01-04 NOTE — DISCHARGE SUMMARY
[facility] Medicine  Discharge Summary      Patient Name: Smiley Harmon  MRN: [id number]  Patient Class: IP- Inpatient  Admission Date: 12/20/2021  Hospital Length of Stay: 9 days  Discharge Date and Time:      Attending Physician: No att. providers found   Discharging Provider: Osmany Allison Jr, MD  Primary Care Provider: Osmany Allison Jr, MD      HPI:   Chief Complaint   Patient presents with    Altered Mental Status       Per nursing home client not acting her normal self. Had low O2 sats and a breathing treatment was given at the home.           History Provided By: EMS     0839   Smiley Harmon is a 81 y.o. female with PMHX of  dementia, CVA, CAD, mi, hypertension, hyperlipidemia, who presents to the emergency department C/O altered mental status.     Patient transferred from nursing home due to altered mental status.  Per EMS patient was noted to be altered this morning.  Normally has some orientation and is interactive.  This morning patient less responsive.  Was reportedly 80% on room air.  Patient is awake but provides no history.  She is in hospice but apparently is full code.       PCP: Osmany Allison Jr, MD          * No surgery found *      Hospital Course:   12/22: Patient resting comfortably this morning.  No acute events overnight.  A.m. labs are pending.  12/23:  Patient more alert this am. Resting comfortable asking for breakfast.  12/27: Patient nonverbal this morning but resting comfortably.  12/28: Patient status post a course of antimicrobial therapy.  She now has opacification of the hemithorax.  Suspect fluid related.  12/29:  patient agonal breathing this morning.  Suspect worsening mucous plugging.  Family made aware.     Patient ultimately passed away.  Family made aware.          Goals of Care Treatment Preferences:  Code Status: DNR       [place]: Yes           Consults:     No new Assessment & Plan notes have been filed under this hospital service since  the last note was generated.  Service: Hospital Medicine    Final Active Diagnoses:    Diagnosis Date Noted POA    Multifocal pneumonia [J18.9] 2021 Unknown    Encephalopathy, metabolic [G93.41] 2021 Unknown    Pericardial effusion [I31.3] 2021 Unknown    Hyperlipidemia [E78.5] 2021 Yes    Obesity [E66.9] 2021 Yes    Anemia [D64.9] 2020 Yes    Diabetes mellitus, type 2 [E11.9] 2020 Yes    Acute renal failure [N17.9] 2020 Yes    UTI (urinary tract infection) [N39.0] 2020 Unknown      Problems Resolved During this Admission:       Discharged Condition:     Disposition:     Follow Up:    Patient Instructions:   No discharge procedures on file.    Significant Diagnostic Studies: Labs: All labs within the past 24 hours have been reviewed    Pending Diagnostic Studies:     None         Medications:  None    Indwelling Lines/Drains at time of discharge:   Lines/Drains/Airways     None                 Time spent on the discharge of patient: 60 minutes         Osmany Allison Jr, MD  Department of Hospital Medicine  [facility]

## 2022-01-06 NOTE — PHYSICIAN QUERY
PT Name: Smiley Harmon  MR #: [id number]     DOCUMENTATION CLARIFICATION     CDS/: Diane Crane RN, CDI            Contact information:[email or web address]  This form is a permanent document in the medical record.     Query Date: January 6, 2022    By submitting this query, we are merely seeking further clarification of documentation.  Please utilize your independent clinical judgment when addressing the question(s) below.  The Medical Record contains the following   Indicators   Supporting Clinical Findings Location in Medical Record    SOB, TURNER, Wheezing, Productive Cough, Use of Accessory Muscles, etc.  Breath sounds: Rhonchi present. ED note 12/20   x RR         ABGs         O2 sat    12/20/2021 10:30   POC PH 7.251   POC PCO2 67.6   POC PO2 27.4   POC HCO3 25.3   POC SATURATED O2 46.6   POC TCO2 29.5   FiO2 32.0   O2DEVICE Nasal Cannula   Specimen source Venous     94% on NC, hypoxia requiring supplemental oxygen  Rate: 104 bpm   Rhythm: sinus tach  Lab                                            ED note 12/20    Hypoxia/Hypercapnia      BiPAP/Intubation/Mechanical Ventilation     x Supplemental O2 02sat 84% with 3 liters NC    patient has improved, 02sat 96% with 5L 02 NN 12/27    NN 12/27    Home O2, Oxygen Dependence     x Respiratory distress or failure Patient transferred from nursing home due to altered mental status. Per EMS patient was noted to be altered this morning. This morning patient less responsive.  Was reportedly 80% on room air.     Called Dr Allison to notify of pt agonal breathing.  Pt is DNR. Called haresh Sargent to notify of change in condition. O2 sat on nrb mask 85%. Color grey    Worsening due to respiratory failure.  Family has been counseled.  Continue close inpatient monitoring and palliative care ED note 12/20              NN 12/29            PN 12/29    Radiology findings      Acute/Chronic Illness      Treatment      Other         The noted clinical guidelines are only system  guidelines and do not replace the providers clinical judgment.    Provider, please specify the diagnosis or diagnoses associated with above clinical findings.   [    ] Acute Respiratory Failure with Hypoxia - ABG pO2 < 60 mmHg or O2 sat of <91% on room air and respiratory symptoms documented   [    ] Acute Respiratory Failure with Hypercapnia - pCO2 > 50 mmHg with pH < 7.35 and respiratory symptoms documented   [   x ] Acute Respiratory Failure with Hypoxia and Hypercapnia - Hypoxia: ABG pO2 < 60 mmHg or O2 sat of <91% on room air and Hypercapnia: pCO2 > 50 mmHg with pH < 7.35 and respiratory symptoms documented   [    ] Other Respiratory Diagnosis (please specify): _________________   [   ] Clinically Undetermined       Please document in your progress notes daily for the duration of treatment until resolved and include in your discharge summary.     Reference:    DEMOND Cruz MD. (2020, March 13). Acute respiratory distress syndrome: Clinical features, diagnosis, and complications in adults ([id number] SHEFALI Mendez MD & [id number] MARINA Reeves MD, Eds.). Retrieved November 13, 2020, from [email or web address]-[email or web address]-[email or web address]-[email or web address]?search=ards&source=search_result&selectedTitle=1~150&usage_type=default&display_rank=1  Form No. [id number]

## 2022-12-15 NOTE — SUBJECTIVE & OBJECTIVE
Past Medical History:   Diagnosis Date    Blood disorder     Clostridium difficile infection 2020    Colitis     Coronary artery disease     Depression     Diabetes mellitus     Dizziness 3/26/2021    GERD (gastroesophageal reflux disease)     H/O heart artery stent     Heart disease, unspecified     Hyperlipidemia     Hypertension     Memory loss     Mouth ulceration 2021    Myocardial infarct     Renal disorder     CKD    Sepsis 2021    Septic shock 2020    Stomach ulcer     Stroke     Thyroid disease     UTI (urinary tract infection) 2020       Past Surgical History:   Procedure Laterality Date    APPENDECTOMY      BILATERAL SALPINGOOPHORECTOMY       SECTION      CHOLECYSTECTOMY      CORONARY ANGIOPLASTY WITH STENT PLACEMENT      DILATION AND CURETTAGE OF UTERUS      ELBOW SURGERY      HERNIA REPAIR      abdominal    HIP SURGERY      HYSTERECTOMY  age 27    bleeding    HYSTERECTOMY      KNEE SURGERY      SHOULDER OPEN ROTATOR CUFF REPAIR         Review of patient's allergies indicates:   Allergen Reactions    Motrin [ibuprofen] Other (See Comments)     Feels like heart is swelling    Penicillins Hives and Itching    Sulfa (sulfonamide antibiotics) Nausea And Vomiting    Iodine and iodide containing products Edema    Lisinopril Edema       No current facility-administered medications on file prior to encounter.     Current Outpatient Medications on File Prior to Encounter   Medication Sig    acetaminophen (TYLENOL) 325 MG tablet Take 325 mg by mouth every 6 (six) hours as needed for Pain. 2 tablets  (650 mg)    acetaminophen (TYLENOL) 650 MG Supp Place 650 mg rectally every 8 (eight) hours as needed (for discomfort).    albuterol-ipratropium (DUO-NEB) 2.5 mg-0.5 mg/3 mL nebulizer solution Take 3 mLs by nebulization every 6 (six) hours as needed for Wheezing. Rescue    benzonatate (TESSALON) 200 MG capsule Take 200 mg by mouth 3 (three)  times daily as needed for Cough.    gabapentin (NEURONTIN) 100 MG capsule TAKE 1 CAPSULE BY MOUTH TWICE DAILY    hydrALAZINE (APRESOLINE) 25 MG tablet Take 25 mg by mouth every 12 (twelve) hours as needed.    HYDROcodone-acetaminophen (NORCO)  mg per tablet Take 1 tablet by mouth every 8 (eight) hours as needed for Pain.    hyoscyamine (ANASPAZ,LEVSIN) 0.125 mg Tab Take 125 mcg by mouth every 4 (four) hours as needed (PRN secretions).    insulin lispro 100 unit/mL injection Inject into the skin 2 (two) times a day. Sliding scale:  <60 give OJ and repeat blood sugar in 15 minutes   = 0 units  151-175 = 3 units  176-200 = 5 units  201-250 = 7 units  251-300 = 10 units  301-350 = 13 units  351-400 = 16 units  > 400 = 20 units and call MD    lactulose (CHRONULAC) 10 gram/15 mL solution Take by mouth 4 (four) times daily. Every six (6) hours as needed for constipation    LORazepam (ATIVAN) 2 mg/mL injection Inject 1 mg into the muscle every 4 (four) hours as needed (anxiety).    melatonin (MELATIN) 3 mg tablet Take 2 tablets (6 mg total) by mouth nightly as needed for Insomnia.    mirtazapine (REMERON) 15 MG tablet Take 15 mg by mouth every evening.    montelukast (SINGULAIR) 10 mg tablet Take 10 mg by mouth every evening.    morphine 100 mg/5 mL (20 mg/mL) concentrated solution Take 10 mg by mouth every 4 (four) hours as needed for Pain.    ondansetron (ZOFRAN ODT ORAL) Take 4 mg by mouth 4 (four) times daily as needed (Nausea Vomiting).    pantoprazole (PROTONIX) 40 MG tablet TAKE 1 TABLET BY MOUTH TWICE DAILY    paroxetine (PAXIL) 20 MG tablet Take 20 mg by mouth every morning.    sucralfate (CARAFATE) 1 gram tablet Take 1 g by mouth 4 (four) times daily.    tiZANidine 4 mg Cap Take 4 mg by mouth every 8 (eight) hours as needed.    aluminum & magnesium hydroxide-simethicone (MYLANTA MAXIMUM STRENGTH) 400-400-40 mg/5 mL suspension Take 10 mLs by mouth every 4 (four) hours as needed for  Indigestion.    budesonide (PULMICORT) 0.5 mg/2 mL nebulizer solution Take 2 mLs (0.5 mg total) by nebulization 2 (two) times a day. Controller     Family History     Problem Relation (Age of Onset)    Cancer Maternal Grandmother, Paternal Grandfather    Diabetes Mother    Heart attack Father    Hypertension Mother    Migraines Mother, Paternal Aunt, Son    Stroke Paternal Aunt    Thyroid disease Mother        Tobacco Use    Smoking status: Former Smoker     Quit date: 2005     Years since quittin.9    Smokeless tobacco: Not on file   Substance and Sexual Activity    Alcohol use: No    Drug use: No    Sexual activity: Not Currently     Partners: Male     Birth control/protection: Surgical, Post-menopausal     Comment:      Review of Systems   Unable to perform ROS: Mental status change     Objective:     Vital Signs (Most Recent):  Temp: 98.8 °F (37.1 °C) (21)  Pulse: 60 (21)  Resp: 20 (21)  BP: 118/61 (21)  SpO2: (!) 82 % (21) Vital Signs (24h Range):  Temp:  [96.9 °F (36.1 °C)-98.8 °F (37.1 °C)] 98.8 °F (37.1 °C)  Pulse:  [52-73] 60  Resp:  [17-40] 20  SpO2:  [82 %-94 %] 82 %  BP: (118-179)/(61-99) 118/61     Weight: 90.2 kg (198 lb 14.4 oz)  Body mass index is 32.1 kg/m².    Physical Exam  Vitals and nursing note reviewed.   Constitutional:       Appearance: She is ill-appearing and toxic-appearing.   HENT:      Head: Normocephalic and atraumatic.      Nose: Nose normal.      Mouth/Throat:      Mouth: Mucous membranes are dry.   Eyes:      Extraocular Movements: Extraocular movements intact.      Pupils: Pupils are equal, round, and reactive to light.   Cardiovascular:      Rate and Rhythm: Regular rhythm. Tachycardia present.      Heart sounds: No murmur heard.  No friction rub. No gallop.    Pulmonary:      Effort: Pulmonary effort is normal.      Breath sounds: Wheezing, rhonchi and rales present.   Abdominal:      General: Abdomen  is flat. Bowel sounds are normal.      Palpations: Abdomen is soft.   Musculoskeletal:         General: No swelling or deformity.      Cervical back: Normal range of motion and neck supple.   Skin:     General: Skin is warm and dry.      Capillary Refill: Capillary refill takes less than 2 seconds.      Coloration: Skin is pale.   Neurological:      Comments: lethargic           CRANIAL NERVES     CN III, IV, VI   Pupils are equal, round, and reactive to light.       Significant Labs: All pertinent labs within the past 24 hours have been reviewed.    Significant Imaging: I have reviewed all pertinent imaging results/findings within the past 24 hours.   none

## 2024-02-09 NOTE — NURSING
Called Dr Allison to notify of pt agonal breathing.  Pt is DNR. Called son Arie to notify of change in condition. O2 sat on nrb mask 85%. Color grey.  
MD Gibson called back. Informed him that patient has improved, 02sat 96% with 5L 02, patient more responsive and talkative. No respiratory distress noted. No new orders at this time.   
Moved pt to room 620 for another pt to be moved closer to nursing station. Called son to notify.   
Patient observed to have SOB, pale, diminished lung sounds with wheezing throughout. Patient B/P 188/95 P 60 02sat 84% with 3 liters NC, RR32. Patient having difficulty with speech, slow to respond. Hand grasps equal, pupils slow to react. Respiratory notified and increased O2 to 5L/ NC, 02sat increased to 95%. MD notified but no answer pending response.   
Pt. . Pronounced per ER Doctor. Family present at bedside. Catheter and Midline removed. Post mortuum care provided.    
Rina notified of pt. Expiring status   [facility]  home notified at 1938.   Armand Thompson Jr. With [facility] picked up body at .  
30

## 2025-01-06 NOTE — PLAN OF CARE
Pt remains in ICU. Pt blood sugars stable throughout shift. Pt had a very large BM at beginning of shift. Pt had 2 more small BM during shift. BM still dark red and clots present. Pt a little more confused than usual. Pt has not slept much throughout the night, 2-3 hours at the most. Pt maintained stable blood pressure without levophed. Afebrile. Vital signs are stable. Will cont to monitor.    Detail Level: Generalized Protocol For Photochemotherapy For Severe Photoresponsive Dermatoses: Petrolatum And Nbuvb: The patient received Photochemotherapy for severe photoresponsive dermatoses: Petrolatum and NBUVB requiring at least 4 to 8 hours of care under direct physician supervision. Protocol For Protocol For Photochemotherapy For Severe Photoresponsive Dermatoses: Bath Puva: The patient received Photochemotherapy for severe photoresponsive dermatoses: Bath PUVA requiring at least 4 to 8 hours of care under direct physician supervision. Protocol For Photochemotherapy: Baby Oil And Nbuvb: The patient received Photochemotherapy: Baby Oil and NBUVB (baby oil applied to all lesions prior to phototherapy). Protocol For Nbuvb: Hands/Feet: The patient received NBUVB. Protocol For Uva1: The patient received UVA1. Total Body Energy: 895   mj Protocol For Photochemotherapy: Petrolatum And Broad Band Uvb: The patient received Photochemotherapy: Petrolatum and Broad Band UVB. Protocol For Photochemotherapy: Tar And Nbuvb (Goeckerman Treatment): The patient received Photochemotherapy: Tar and NBUVB (Goeckerman treatment). Protocol For Photochemotherapy: Mineral Oil And Broad Band Uvb: The patient received Photochemotherapy: Mineral Oil and Broad Band UVB. Protocol For Nb Uva: The patient received NB UVA. Protocol For Broad Band Uvb: The patient received Broad Band UVB. Protocol For Puva: The patient received PUVA. Protocol: Photochemotherapy: Mineral Oil and NBUVB Total Body Time: 3:31 Name Of Supervising Technician: ck Protocol For Photochemotherapy: Mineral Oil And Nbuvb: The patient received Photochemotherapy: Mineral Oil and NBUVB (mineral oil applied to all lesions prior to phototherapy). Post-Care Instructions: I reviewed with the patient in detail post-care instructions. Patient is to wear sun protection. Patients may expect sunburn like redness, discomfort and scabbing. Protocol For Photochemotherapy For Severe Photoresponsive Dermatoses: Tar And Broad Band Uvb (Goeckerman Treatment): The patient received Photochemotherapy for severe photoresponsive dermatoses: Tar and Broad Band UVB (Goeckerman treatment) requiring at least 4 to 8 hours of care under direct physician supervision. Protocol For Photochemotherapy: Petrolatum And Nbuvb: The patient received Photochemotherapy: Petrolatum and NBUVB (petrolatum applied to all lesions prior to phototherapy). Protocol For Photochemotherapy: Triamcinolone Ointment And Nbuvb: The patient received Photochemotherapy: Triamcinolone and NBUVB (triamcinolone ointment applied to all lesions prior to phototherapy). Consent: Written consent obtained.  The risks were reviewed with the patient including but not limited to: burn, pigmentary changes, pain, blistering, scabbing, redness, increased risk of skin cancers, and the remote possibility of scarring. Irradiance (Optional- Include Units): 4.29 Protocol For Photochemotherapy For Severe Photoresponsive Dermatoses: Puva: The patient received Photochemotherapy for severe photoresponsive dermatoses: PUVA requiring at least 4 to 8 hours of care under direct physician supervision. Treatment Number: 36 Protocol For Bath Puva: The patient received Bath PUVA. Protocol For Photochemotherapy For Severe Photoresponsive Dermatoses: Petrolatum And Broad Band Uvb: The patient received Photochemotherapyfor severe photoresponsive dermatoses: Petrolatum and Broad Band UVB requiring at least 4 to 8 hours of care under direct physician supervision. Protocol For Uva: The patient received UVA. Skin Type: II Protocol For Photochemotherapy: Tar And Broad Band Uvb (Goeckerman Treatment): The patient received Photochemotherapy: Tar and Broad Band UVB (Goeckerman treatment). Protocol For Photochemotherapy For Severe Photoresponsive Dermatoses: Tar And Nbuvb (Goeckerman Treatment): The patient received Photochemotherapy for severe photoresponsive dermatoses: Tar and NBUVB (Goeckerman treatment) requiring at least 4 to 8 hours of care under direct physician supervision. Render Post-Care In The Note: no